# Patient Record
Sex: FEMALE | Race: WHITE | NOT HISPANIC OR LATINO | Employment: OTHER | URBAN - METROPOLITAN AREA
[De-identification: names, ages, dates, MRNs, and addresses within clinical notes are randomized per-mention and may not be internally consistent; named-entity substitution may affect disease eponyms.]

---

## 2019-12-21 ENCOUNTER — HOSPITAL ENCOUNTER (EMERGENCY)
Facility: HOSPITAL | Age: 84
Discharge: HOME | End: 2019-12-21
Attending: EMERGENCY MEDICINE
Payer: MEDICARE

## 2019-12-21 VITALS
TEMPERATURE: 99 F | HEART RATE: 61 BPM | HEIGHT: 62 IN | WEIGHT: 107 LBS | BODY MASS INDEX: 19.69 KG/M2 | DIASTOLIC BLOOD PRESSURE: 61 MMHG | OXYGEN SATURATION: 97 % | SYSTOLIC BLOOD PRESSURE: 130 MMHG | RESPIRATION RATE: 20 BRPM

## 2019-12-21 DIAGNOSIS — R00.0 TACHYCARDIA: ICD-10-CM

## 2019-12-21 DIAGNOSIS — E87.6 HYPOKALEMIA: Primary | ICD-10-CM

## 2019-12-21 DIAGNOSIS — R00.2 PALPITATIONS: ICD-10-CM

## 2019-12-21 LAB
ALBUMIN SERPL BCP-MCNC: 3.8 G/DL (ref 3.5–5.2)
ALP SERPL-CCNC: 131 U/L (ref 55–135)
ALT SERPL W/O P-5'-P-CCNC: 12 U/L (ref 10–44)
ANION GAP SERPL CALC-SCNC: 13 MMOL/L (ref 8–16)
AST SERPL-CCNC: 21 U/L (ref 10–40)
BACTERIA #/AREA URNS AUTO: ABNORMAL /HPF
BASOPHILS # BLD AUTO: 0.04 K/UL (ref 0–0.2)
BASOPHILS NFR BLD: 0.9 % (ref 0–1.9)
BILIRUB SERPL-MCNC: 0.4 MG/DL (ref 0.1–1)
BILIRUB UR QL STRIP: NEGATIVE
BNP SERPL-MCNC: 194 PG/ML (ref 0–99)
BUN SERPL-MCNC: 10 MG/DL (ref 10–30)
BUN SERPL-MCNC: 9 MG/DL (ref 6–30)
CALCIUM SERPL-MCNC: 9.9 MG/DL (ref 8.7–10.5)
CHLORIDE SERPL-SCNC: 100 MMOL/L (ref 95–110)
CHLORIDE SERPL-SCNC: 96 MMOL/L (ref 95–110)
CLARITY UR REFRACT.AUTO: CLEAR
CO2 SERPL-SCNC: 24 MMOL/L (ref 23–29)
COLOR UR AUTO: ABNORMAL
CREAT SERPL-MCNC: 0.5 MG/DL (ref 0.5–1.4)
CREAT SERPL-MCNC: 0.7 MG/DL (ref 0.5–1.4)
DIFFERENTIAL METHOD: ABNORMAL
EOSINOPHIL # BLD AUTO: 0.1 K/UL (ref 0–0.5)
EOSINOPHIL NFR BLD: 3.1 % (ref 0–8)
ERYTHROCYTE [DISTWIDTH] IN BLOOD BY AUTOMATED COUNT: 12.5 % (ref 11.5–14.5)
EST. GFR  (AFRICAN AMERICAN): >60 ML/MIN/1.73 M^2
EST. GFR  (NON AFRICAN AMERICAN): >60 ML/MIN/1.73 M^2
GLUCOSE SERPL-MCNC: 110 MG/DL (ref 70–110)
GLUCOSE SERPL-MCNC: 141 MG/DL (ref 70–110)
GLUCOSE UR QL STRIP: NEGATIVE
HCT VFR BLD AUTO: 39.8 % (ref 37–48.5)
HCT VFR BLD CALC: 35 %PCV (ref 36–54)
HGB BLD-MCNC: 13.3 G/DL (ref 12–16)
HGB UR QL STRIP: NEGATIVE
IMM GRANULOCYTES # BLD AUTO: 0 K/UL (ref 0–0.04)
IMM GRANULOCYTES NFR BLD AUTO: 0 % (ref 0–0.5)
KETONES UR QL STRIP: NEGATIVE
LEUKOCYTE ESTERASE UR QL STRIP: ABNORMAL
LYMPHOCYTES # BLD AUTO: 1.7 K/UL (ref 1–4.8)
LYMPHOCYTES NFR BLD: 36.9 % (ref 18–48)
MAGNESIUM SERPL-MCNC: 1.9 MG/DL (ref 1.6–2.6)
MCH RBC QN AUTO: 32.4 PG (ref 27–31)
MCHC RBC AUTO-ENTMCNC: 33.4 G/DL (ref 32–36)
MCV RBC AUTO: 97 FL (ref 82–98)
MICROSCOPIC COMMENT: ABNORMAL
MONOCYTES # BLD AUTO: 0.5 K/UL (ref 0.3–1)
MONOCYTES NFR BLD: 10.9 % (ref 4–15)
NEUTROPHILS # BLD AUTO: 2.2 K/UL (ref 1.8–7.7)
NEUTROPHILS NFR BLD: 48.2 % (ref 38–73)
NITRITE UR QL STRIP: NEGATIVE
NON-SQ EPI CELLS #/AREA URNS AUTO: 1 /HPF
NRBC BLD-RTO: 0 /100 WBC
PH UR STRIP: 7 [PH] (ref 5–8)
PHOSPHATE SERPL-MCNC: 2.9 MG/DL (ref 2.7–4.5)
PLATELET # BLD AUTO: 244 K/UL (ref 150–350)
PMV BLD AUTO: 9.8 FL (ref 9.2–12.9)
POC IONIZED CALCIUM: 1.07 MMOL/L (ref 1.06–1.42)
POC TCO2 (MEASURED): 24 MMOL/L (ref 23–29)
POTASSIUM BLD-SCNC: 3.7 MMOL/L (ref 3.5–5.1)
POTASSIUM SERPL-SCNC: 2.7 MMOL/L (ref 3.5–5.1)
PROT SERPL-MCNC: 7.6 G/DL (ref 6–8.4)
PROT UR QL STRIP: NEGATIVE
RBC # BLD AUTO: 4.11 M/UL (ref 4–5.4)
RBC #/AREA URNS AUTO: 1 /HPF (ref 0–4)
SAMPLE: ABNORMAL
SODIUM BLD-SCNC: 133 MMOL/L (ref 136–145)
SODIUM SERPL-SCNC: 133 MMOL/L (ref 136–145)
SP GR UR STRIP: 1 (ref 1–1.03)
SQUAMOUS #/AREA URNS AUTO: 1 /HPF
TROPONIN I SERPL DL<=0.01 NG/ML-MCNC: 0.02 NG/ML (ref 0–0.03)
TSH SERPL DL<=0.005 MIU/L-ACNC: 0.63 UIU/ML (ref 0.4–4)
URN SPEC COLLECT METH UR: ABNORMAL
WBC # BLD AUTO: 4.5 K/UL (ref 3.9–12.7)
WBC #/AREA URNS AUTO: 27 /HPF (ref 0–5)

## 2019-12-21 PROCEDURE — 83735 ASSAY OF MAGNESIUM: CPT

## 2019-12-21 PROCEDURE — 84484 ASSAY OF TROPONIN QUANT: CPT

## 2019-12-21 PROCEDURE — 81001 URINALYSIS AUTO W/SCOPE: CPT

## 2019-12-21 PROCEDURE — 25000003 PHARM REV CODE 250: Performed by: STUDENT IN AN ORGANIZED HEALTH CARE EDUCATION/TRAINING PROGRAM

## 2019-12-21 PROCEDURE — 83880 ASSAY OF NATRIURETIC PEPTIDE: CPT

## 2019-12-21 PROCEDURE — 99285 PR EMERGENCY DEPT VISIT,LEVEL V: ICD-10-PCS | Mod: GC,,, | Performed by: EMERGENCY MEDICINE

## 2019-12-21 PROCEDURE — 93005 ELECTROCARDIOGRAM TRACING: CPT

## 2019-12-21 PROCEDURE — 99285 EMERGENCY DEPT VISIT HI MDM: CPT | Mod: GC,,, | Performed by: EMERGENCY MEDICINE

## 2019-12-21 PROCEDURE — 87086 URINE CULTURE/COLONY COUNT: CPT

## 2019-12-21 PROCEDURE — 84100 ASSAY OF PHOSPHORUS: CPT

## 2019-12-21 PROCEDURE — 85025 COMPLETE CBC W/AUTO DIFF WBC: CPT

## 2019-12-21 PROCEDURE — 84443 ASSAY THYROID STIM HORMONE: CPT

## 2019-12-21 PROCEDURE — 63600175 PHARM REV CODE 636 W HCPCS: Performed by: EMERGENCY MEDICINE

## 2019-12-21 PROCEDURE — 96366 THER/PROPH/DIAG IV INF ADDON: CPT

## 2019-12-21 PROCEDURE — 63600175 PHARM REV CODE 636 W HCPCS: Performed by: STUDENT IN AN ORGANIZED HEALTH CARE EDUCATION/TRAINING PROGRAM

## 2019-12-21 PROCEDURE — 96365 THER/PROPH/DIAG IV INF INIT: CPT

## 2019-12-21 PROCEDURE — 99284 EMERGENCY DEPT VISIT MOD MDM: CPT | Mod: 25

## 2019-12-21 PROCEDURE — 96361 HYDRATE IV INFUSION ADD-ON: CPT

## 2019-12-21 PROCEDURE — 93010 ELECTROCARDIOGRAM REPORT: CPT | Mod: 76,,, | Performed by: INTERNAL MEDICINE

## 2019-12-21 PROCEDURE — 96368 THER/DIAG CONCURRENT INF: CPT

## 2019-12-21 PROCEDURE — 93010 ELECTROCARDIOGRAM REPORT: CPT | Mod: ,,, | Performed by: INTERNAL MEDICINE

## 2019-12-21 PROCEDURE — 80053 COMPREHEN METABOLIC PANEL: CPT

## 2019-12-21 PROCEDURE — 80047 BASIC METABLC PNL IONIZED CA: CPT | Mod: 59

## 2019-12-21 PROCEDURE — 25000003 PHARM REV CODE 250: Performed by: EMERGENCY MEDICINE

## 2019-12-21 PROCEDURE — 93010 EKG 12-LEAD: ICD-10-PCS | Mod: 76,,, | Performed by: INTERNAL MEDICINE

## 2019-12-21 RX ORDER — POTASSIUM CHLORIDE 7.45 MG/ML
10 INJECTION INTRAVENOUS
Status: COMPLETED | OUTPATIENT
Start: 2019-12-21 | End: 2019-12-21

## 2019-12-21 RX ORDER — METOPROLOL TARTRATE 25 MG/1
25 TABLET, FILM COATED ORAL
Status: COMPLETED | OUTPATIENT
Start: 2019-12-21 | End: 2019-12-21

## 2019-12-21 RX ORDER — MAGNESIUM SULFATE HEPTAHYDRATE 40 MG/ML
2 INJECTION, SOLUTION INTRAVENOUS
Status: COMPLETED | OUTPATIENT
Start: 2019-12-21 | End: 2019-12-21

## 2019-12-21 RX ORDER — METOPROLOL SUCCINATE 50 MG/1
50 TABLET, EXTENDED RELEASE ORAL
Status: COMPLETED | OUTPATIENT
Start: 2019-12-21 | End: 2019-12-21

## 2019-12-21 RX ORDER — SODIUM CHLORIDE 9 MG/ML
500 INJECTION, SOLUTION INTRAVENOUS
Status: COMPLETED | OUTPATIENT
Start: 2019-12-21 | End: 2019-12-21

## 2019-12-21 RX ORDER — POTASSIUM CHLORIDE 20 MEQ/1
40 TABLET, EXTENDED RELEASE ORAL
Status: COMPLETED | OUTPATIENT
Start: 2019-12-21 | End: 2019-12-21

## 2019-12-21 RX ADMIN — METOPROLOL SUCCINATE 50 MG: 50 TABLET, EXTENDED RELEASE ORAL at 07:12

## 2019-12-21 RX ADMIN — POTASSIUM CHLORIDE 40 MEQ: 1500 TABLET, EXTENDED RELEASE ORAL at 08:12

## 2019-12-21 RX ADMIN — SODIUM CHLORIDE 500 ML: 0.9 INJECTION, SOLUTION INTRAVENOUS at 07:12

## 2019-12-21 RX ADMIN — MAGNESIUM SULFATE IN WATER 2 G: 40 INJECTION, SOLUTION INTRAVENOUS at 08:12

## 2019-12-21 RX ADMIN — POTASSIUM CHLORIDE 10 MEQ: 7.46 INJECTION, SOLUTION INTRAVENOUS at 08:12

## 2019-12-21 RX ADMIN — METOPROLOL TARTRATE 25 MG: 25 TABLET, FILM COATED ORAL at 07:12

## 2019-12-22 LAB
BACTERIA UR CULT: NORMAL
BACTERIA UR CULT: NORMAL

## 2019-12-22 NOTE — ED NOTES
Patient denies cp/palpitations at this time. No complaints or needs at this time. NAD noted. Family at bedside.

## 2019-12-22 NOTE — DISCHARGE INSTRUCTIONS
You were recently seen in the ED for palpitations. We obtained labs and noticed your potassium level was low. We also noticed some white blood cells in your urine which can be indicative of a urinary tract infection. Since you are not experiencing any urinary tract symptoms, we will not start antibiotics. We will follow up with the urine culture and notify you if it is positive for bacteria.   We discussed that we noticed an abnormal rhythm called atrial fibrillation. We discussed our preference to admit you into the hospital but also very well respect your hesitation.     We encourage that you eat more bananas to increase your potassium levels.     We also strongly advise that you follow up with your primary care provider within a week.     Return to the ED if you experience increasing palpitations, chest pain, shortness of breath, lightheadedness.

## 2019-12-22 NOTE — ED PROVIDER NOTES
Encounter Date: 12/21/2019       History     Chief Complaint   Patient presents with    Palpitations     states she keeps having flashes     Elisha Young is a 89 year old F with HTN, HLD, Diastolic dysfunction presenting to the ED for palpitations. Patient states earlier today while resting, she noted palpitations which occurred for a few minutes associated with not 'feeling herself', but denies lightheadedness, chest pain or SOB. Shortly after the episode resolved, she took a walk next door but didn't experience any further symptoms. She denies prior similar episodes. She didn't take her metoprolol dose this AM because her BP machine was broke and she was unable to check her pulse prior to taking it. She denies chest pain, SOB, nausea, vomiting, fever, chills, dysuria, LE edema, urinary changes or weakness. She states she has been eating well and denies loss of appetite. She denies any further episodes of palpitations, however she endorses not feeling quite like herself since the first episode. Patient's son who was at bedside stated she has been feeling anxious for a while now and constantly worries about her family.    The history is provided by the patient and a relative.     Review of patient's allergies indicates:  No Known Allergies  Past Medical History:   Diagnosis Date    CHF (congestive heart failure)     Dyslipidemia     Hyperlipidemia     Hypertension      Past Surgical History:   Procedure Laterality Date    HYSTERECTOMY      tonsils       Family History   Problem Relation Age of Onset    Heart disease Father      Social History     Tobacco Use    Smoking status: Never Smoker   Substance Use Topics    Alcohol use: No    Drug use: Not on file     Review of Systems   Constitutional: Positive for activity change. Negative for chills, fatigue and fever.   Respiratory: Negative for cough, chest tightness, shortness of breath and wheezing.    Cardiovascular: Positive for palpitations. Negative  for chest pain and leg swelling.   Gastrointestinal: Negative for abdominal distention, abdominal pain, constipation, diarrhea, nausea and vomiting.   Endocrine: Negative for polyuria.   Genitourinary: Negative for difficulty urinating, dysuria and frequency.   Musculoskeletal: Negative for gait problem and joint swelling.   Skin: Negative for wound.   Neurological: Negative for dizziness, weakness, light-headedness, numbness and headaches.   Psychiatric/Behavioral: Negative for behavioral problems, confusion and decreased concentration.   All other systems reviewed and are negative.      Physical Exam     Initial Vitals [12/21/19 1758]   BP Pulse Resp Temp SpO2   119/60 110 20 98.1 °F (36.7 °C) 97 %      MAP       --         Physical Exam    Constitutional: She appears well-developed and well-nourished. She is not diaphoretic. No distress.   HENT:   Head: Normocephalic and atraumatic.   Eyes: Conjunctivae and EOM are normal. No scleral icterus.   Neck: Normal range of motion. Neck supple. No thyromegaly present. No JVD present.   Cardiovascular: Normal heart sounds and intact distal pulses.   No murmur heard.  Tachycardic. Irregularly irregular rhythm   Pulmonary/Chest: Breath sounds normal. No respiratory distress. She has no wheezes. She has no rhonchi. She has no rales.   Abdominal: Soft. Bowel sounds are normal. She exhibits no distension. There is no tenderness.   Musculoskeletal: Normal range of motion. She exhibits no edema or tenderness.   Neurological: She is alert and oriented to person, place, and time.   Skin: Skin is warm. Capillary refill takes less than 2 seconds.   Psychiatric: She has a normal mood and affect. Her behavior is normal. Thought content normal.         ED Course   Procedures  Labs Reviewed   CBC W/ AUTO DIFFERENTIAL - Abnormal; Notable for the following components:       Result Value    Mean Corpuscular Hemoglobin 32.4 (*)     All other components within normal limits    Narrative:      SHARED LAV   COMPREHENSIVE METABOLIC PANEL - Abnormal; Notable for the following components:    Sodium 133 (*)     Potassium 2.7 (*)     Glucose 141 (*)     All other components within normal limits    Narrative:     SHARED LAV   B-TYPE NATRIURETIC PEPTIDE - Abnormal; Notable for the following components:     (*)     All other components within normal limits    Narrative:     SHARED LAV   URINALYSIS, REFLEX TO URINE CULTURE - Abnormal; Notable for the following components:    Leukocytes, UA 3+ (*)     All other components within normal limits    Narrative:     Preferred Collection Type->Urine, Clean Catch   URINALYSIS MICROSCOPIC - Abnormal; Notable for the following components:    WBC, UA 27 (*)     Non-Squam Epith 1 (*)     All other components within normal limits    Narrative:     Preferred Collection Type->Urine, Clean Catch   ISTAT PROCEDURE - Abnormal; Notable for the following components:    POC Sodium 133 (*)     POC Hematocrit 35 (*)     All other components within normal limits   CULTURE, URINE   TROPONIN I    Narrative:     SHARED LAV   MAGNESIUM    Narrative:     SHARED LAV   TSH    Narrative:     SHARED LAV   PHOSPHORUS    Narrative:     SHARED LAV   ISTAT CHEM8     EKG Readings: (Independently Interpreted)   Initial Reading: No STEMI. Rhythm: Sinus Tachycardia. Heart Rate: 132. Ectopy: PVCs. ST Segments: Non-Specific ST Segment Depression. Axis: Left Axis Deviation.       Imaging Results    None          Medical Decision Making:   History:   I obtained history from: someone other than patient.  Old Medical Records: I decided to obtain old medical records.  Old Records Summarized: records from clinic visits.       <> Summary of Records: Patient was last seen by cardiology in 2015 for a follow up.   Initial Assessment:   Elisha Young is a 89 year old F with diastolic dysfunction, HTN, dyslipidemia presenting for an episode of palpitations without associated symptoms. Noted to have new onset  A-fib. Tachycardia improved with fluid and metoprolol administration.   Differential Diagnosis:   Includes but not limited to:     Cardiac Arrhythmia  Hyperthyroidism  Electrolyte abnormalities  Sepsis  Anxiety  Independently Interpreted Test(s):   I have ordered and independently interpreted X-rays - see prior notes.  I have ordered and independently interpreted EKG Reading(s) - see prior notes  Clinical Tests:   Lab Tests: Ordered and Reviewed  Radiological Study: Ordered and Reviewed  Medical Tests: Ordered and Reviewed  ED Management:  On arrival noted to be in 100s. Patient denies feeling palpitations. Labs have been ordered. Administered 500cc bolus given patient does have diastolic dysfunction and there is no recent TTE to assess for cardiac function. Administered patient's missed dose of metoprolol 25mg.   Noted to be in and out of atrial fibrillation. Although unsure of onset, as patient doesn't feel palpitations.   1845: received 500cc of IV NS and metoprolol 50mg. Tachycardia appeared to improve. HR ~90s. CMP shows hypokalemia. replacing with 10mEq IV potassium and 40mEq of oral potassium.     2130: Informed the patient of our intention to admit for further monitoring given she is in paroxysmal A-fib. Patient stated she has been told of an 'irregular heartbeat' but unaware of a prior diagnosis of A-fib. Patient declined admission and stated she would rather go home. Strongly encouraged that the patient stay but she continued to hesitate. Explained the possible risks and patient expressed understanding.   UA shows pyuria without bacteria. Since patient is asymptomatic, we will not treat at this time. If urine culture returns positive, then we will contact patient for treatment. Advised patient to follow up with PCP within a week.     2253: Repeat K 3.7. Advised patient to eat more bananas. Patient is stable and is being discharged home.               Attending Attestation:   Physician Attestation Statement  for Resident:  As the supervising MD   Physician Attestation Statement: I have personally seen and examined this patient.   I agree with the above history. -:   As the supervising MD I agree with the above PE.    As the supervising MD I agree with the above treatment, course, plan, and disposition.            Attending ED Notes:   Emergent evaluation of palpitations.  Patient presents with tachycardia.  Initial concerns for medication noncompliance, dehydration, electrolyte abnormality.  She is on metoprolol at home, but has not taken her dose today.  Hemodynamically stable. Lab work obtained.  Hypokalemia noted.  Began oral and IV replacement.  IV fluids given.  Normal renal function. Given her tachy dysrhythmia and electrolyte abnormality, it seemed that the patient should be admitted for cardiac monitoring.  However after discussion with her and her son, she would like to go home.  Potassium repleted in the emergency department and repeat testing was within normal limits. Recommended that she follow up this week with her primary care provider for re-evaluation of her electrolytes.  Return precautions advised.                        Clinical Impression:       ICD-10-CM ICD-9-CM   1. Hypokalemia E87.6 276.8   2. Palpitations R00.2 785.1   3. Tachycardia R00.0 785.0         Disposition:   Disposition: Discharged  Condition: Stable                     Rudi Carr MD  Resident  12/21/19 [id number]       Oscar Munoz MD  12/22/19 0006

## 2019-12-26 ENCOUNTER — HOSPITAL ENCOUNTER (INPATIENT)
Facility: HOSPITAL | Age: 84
LOS: 7 days | Discharge: NURSING, REHAB OR LONG-TERM CARE | DRG: 536 | End: 2020-01-02
Attending: EMERGENCY MEDICINE | Admitting: HOSPITALIST
Payer: MEDICARE

## 2019-12-26 DIAGNOSIS — S32.10XA CLOSED FRACTURE OF SACRUM, UNSPECIFIED FRACTURE MORPHOLOGY, INITIAL ENCOUNTER: ICD-10-CM

## 2019-12-26 DIAGNOSIS — S41.111A SKIN TEAR OF RIGHT UPPER ARM WITHOUT COMPLICATION, INITIAL ENCOUNTER: ICD-10-CM

## 2019-12-26 DIAGNOSIS — S32.591A CLOSED FRACTURE OF RAMUS OF RIGHT PUBIS, INITIAL ENCOUNTER: ICD-10-CM
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
## 2019-12-26 DIAGNOSIS — E87.6 HYPOKALEMIA: ICD-10-CM

## 2019-12-26 DIAGNOSIS — D64.9 ANEMIA, UNSPECIFIED TYPE: ICD-10-CM

## 2019-12-26 DIAGNOSIS — R79.89 ELEVATED TROPONIN: ICD-10-CM

## 2019-12-26 DIAGNOSIS — R00.0 TACHYCARDIA: ICD-10-CM

## 2019-12-26 DIAGNOSIS — I49.9 CARDIAC RHYTHM DISORDER OR DISTURBANCE OR CHANGE: ICD-10-CM

## 2019-12-26 DIAGNOSIS — S32.591A INFERIOR PUBIC RAMUS FRACTURE, RIGHT, CLOSED, INITIAL ENCOUNTER: ICD-10-CM

## 2019-12-26 DIAGNOSIS — I10 ESSENTIAL HYPERTENSION: ICD-10-CM

## 2019-12-26 DIAGNOSIS — I48.91 NEW ONSET ATRIAL FIBRILLATION: Primary | ICD-10-CM

## 2019-12-26 DIAGNOSIS — S32.591A: ICD-10-CM

## 2019-12-26 DIAGNOSIS — S32.511A CLOSED FRACTURE OF SUPERIOR RAMUS OF RIGHT PUBIS, INITIAL ENCOUNTER: ICD-10-CM

## 2019-12-26 PROBLEM — E87.1 HYPONATREMIA: Status: ACTIVE | Noted: 2019-12-26

## 2019-12-26 LAB
ABO + RH BLD: NORMAL
ALBUMIN SERPL BCP-MCNC: 3.7 G/DL (ref 3.5–5.2)
ALP SERPL-CCNC: 109 U/L (ref 55–135)
ALT SERPL W/O P-5'-P-CCNC: 12 U/L (ref 10–44)
ANION GAP SERPL CALC-SCNC: 13 MMOL/L (ref 8–16)
AST SERPL-CCNC: 24 U/L (ref 10–40)
BASOPHILS # BLD AUTO: 0.03 K/UL (ref 0–0.2)
BASOPHILS NFR BLD: 0.3 % (ref 0–1.9)
BILIRUB SERPL-MCNC: 0.7 MG/DL (ref 0.1–1)
BLD GP AB SCN CELLS X3 SERPL QL: NORMAL
BUN SERPL-MCNC: 23 MG/DL (ref 10–30)
CALCIUM SERPL-MCNC: 10 MG/DL (ref 8.7–10.5)
CHLORIDE SERPL-SCNC: 97 MMOL/L (ref 95–110)
CO2 SERPL-SCNC: 23 MMOL/L (ref 23–29)
CREAT SERPL-MCNC: 0.8 MG/DL (ref 0.5–1.4)
DIFFERENTIAL METHOD: ABNORMAL
EOSINOPHIL # BLD AUTO: 0 K/UL (ref 0–0.5)
EOSINOPHIL NFR BLD: 0.3 % (ref 0–8)
ERYTHROCYTE [DISTWIDTH] IN BLOOD BY AUTOMATED COUNT: 12.9 % (ref 11.5–14.5)
EST. GFR  (AFRICAN AMERICAN): >60 ML/MIN/1.73 M^2
EST. GFR  (NON AFRICAN AMERICAN): >60 ML/MIN/1.73 M^2
GLUCOSE SERPL-MCNC: 134 MG/DL (ref 70–110)
HCT VFR BLD AUTO: 31 % (ref 37–48.5)
HGB BLD-MCNC: 10.4 G/DL (ref 12–16)
IMM GRANULOCYTES # BLD AUTO: 0.03 K/UL (ref 0–0.04)
IMM GRANULOCYTES NFR BLD AUTO: 0.3 % (ref 0–0.5)
INR PPP: 1 (ref 0.8–1.2)
LYMPHOCYTES # BLD AUTO: 1.3 K/UL (ref 1–4.8)
LYMPHOCYTES NFR BLD: 14.6 % (ref 18–48)
MAGNESIUM SERPL-MCNC: 2.2 MG/DL (ref 1.6–2.6)
MCH RBC QN AUTO: 33.2 PG (ref 27–31)
MCHC RBC AUTO-ENTMCNC: 33.5 G/DL (ref 32–36)
MCV RBC AUTO: 99 FL (ref 82–98)
MONOCYTES # BLD AUTO: 0.9 K/UL (ref 0.3–1)
MONOCYTES NFR BLD: 9.8 % (ref 4–15)
NEUTROPHILS # BLD AUTO: 6.7 K/UL (ref 1.8–7.7)
NEUTROPHILS NFR BLD: 74.7 % (ref 38–73)
NRBC BLD-RTO: 0 /100 WBC
PLATELET # BLD AUTO: 191 K/UL (ref 150–350)
PMV BLD AUTO: 10.4 FL (ref 9.2–12.9)
POTASSIUM SERPL-SCNC: 3.1 MMOL/L (ref 3.5–5.1)
PROT SERPL-MCNC: 7 G/DL (ref 6–8.4)
PROTHROMBIN TIME: 10.7 SEC (ref 9–12.5)
RBC # BLD AUTO: 3.13 M/UL (ref 4–5.4)
SODIUM SERPL-SCNC: 133 MMOL/L (ref 136–145)
TROPONIN I SERPL DL<=0.01 NG/ML-MCNC: 0.07 NG/ML (ref 0–0.03)
TROPONIN I SERPL DL<=0.01 NG/ML-MCNC: 0.12 NG/ML (ref 0–0.03)
TROPONIN I SERPL DL<=0.01 NG/ML-MCNC: 0.13 NG/ML (ref 0–0.03)
TSH SERPL DL<=0.005 MIU/L-ACNC: 0.93 UIU/ML (ref 0.4–4)
WBC # BLD AUTO: 8.92 K/UL (ref 3.9–12.7)

## 2019-12-26 PROCEDURE — 84443 ASSAY THYROID STIM HORMONE: CPT

## 2019-12-26 PROCEDURE — 93005 ELECTROCARDIOGRAM TRACING: CPT

## 2019-12-26 PROCEDURE — 85025 COMPLETE CBC W/AUTO DIFF WBC: CPT

## 2019-12-26 PROCEDURE — 99285 EMERGENCY DEPT VISIT HI MDM: CPT | Mod: 25

## 2019-12-26 PROCEDURE — 85610 PROTHROMBIN TIME: CPT

## 2019-12-26 PROCEDURE — 96361 HYDRATE IV INFUSION ADD-ON: CPT

## 2019-12-26 PROCEDURE — 99285 PR EMERGENCY DEPT VISIT,LEVEL V: ICD-10-PCS | Mod: ,,, | Performed by: EMERGENCY MEDICINE

## 2019-12-26 PROCEDURE — 86850 RBC ANTIBODY SCREEN: CPT

## 2019-12-26 PROCEDURE — 90471 IMMUNIZATION ADMIN: CPT | Performed by: EMERGENCY MEDICINE

## 2019-12-26 PROCEDURE — 99285 EMERGENCY DEPT VISIT HI MDM: CPT | Mod: ,,, | Performed by: EMERGENCY MEDICINE

## 2019-12-26 PROCEDURE — 84484 ASSAY OF TROPONIN QUANT: CPT | Mod: 91

## 2019-12-26 PROCEDURE — 20600001 HC STEP DOWN PRIVATE ROOM

## 2019-12-26 PROCEDURE — 36415 COLL VENOUS BLD VENIPUNCTURE: CPT

## 2019-12-26 PROCEDURE — 99223 1ST HOSP IP/OBS HIGH 75: CPT | Mod: AI,,, | Performed by: HOSPITALIST

## 2019-12-26 PROCEDURE — 80053 COMPREHEN METABOLIC PANEL: CPT

## 2019-12-26 PROCEDURE — 99223 PR INITIAL HOSPITAL CARE,LEVL III: ICD-10-PCS | Mod: AI,,, | Performed by: HOSPITALIST

## 2019-12-26 PROCEDURE — 90715 TDAP VACCINE 7 YRS/> IM: CPT | Performed by: EMERGENCY MEDICINE

## 2019-12-26 PROCEDURE — 25000003 PHARM REV CODE 250: Performed by: EMERGENCY MEDICINE

## 2019-12-26 PROCEDURE — 93010 EKG 12-LEAD: ICD-10-PCS | Mod: ,,, | Performed by: INTERNAL MEDICINE

## 2019-12-26 PROCEDURE — 96374 THER/PROPH/DIAG INJ IV PUSH: CPT

## 2019-12-26 PROCEDURE — 93010 ELECTROCARDIOGRAM REPORT: CPT | Mod: 76,,, | Performed by: INTERNAL MEDICINE

## 2019-12-26 PROCEDURE — 83735 ASSAY OF MAGNESIUM: CPT

## 2019-12-26 PROCEDURE — 63600175 PHARM REV CODE 636 W HCPCS: Performed by: EMERGENCY MEDICINE

## 2019-12-26 RX ORDER — ACETAMINOPHEN 325 MG/1
650 TABLET ORAL EVERY 6 HOURS PRN
Status: DISCONTINUED | OUTPATIENT
Start: 2019-12-26 | End: 2019-12-26

## 2019-12-26 RX ORDER — ONDANSETRON 2 MG/ML
4 INJECTION INTRAMUSCULAR; INTRAVENOUS EVERY 8 HOURS PRN
Status: DISCONTINUED | OUTPATIENT
Start: 2019-12-26 | End: 2020-01-02 | Stop reason: HOSPADM

## 2019-12-26 RX ORDER — METOPROLOL TARTRATE 1 MG/ML
5 INJECTION, SOLUTION INTRAVENOUS
Status: COMPLETED | OUTPATIENT
Start: 2019-12-26 | End: 2019-12-26

## 2019-12-26 RX ORDER — POTASSIUM CHLORIDE 1500 MG/1
TABLET, EXTENDED RELEASE ORAL ONCE
Status: ON HOLD | COMMUNITY
End: 2019-12-31 | Stop reason: HOSPADM

## 2019-12-26 RX ORDER — ACETAMINOPHEN 325 MG/1
650 TABLET ORAL EVERY 6 HOURS PRN
Status: DISCONTINUED | OUTPATIENT
Start: 2019-12-26 | End: 2020-01-02 | Stop reason: HOSPADM

## 2019-12-26 RX ORDER — GLUCAGON 1 MG
1 KIT INJECTION
Status: DISCONTINUED | OUTPATIENT
Start: 2019-12-26 | End: 2020-01-02 | Stop reason: HOSPADM

## 2019-12-26 RX ORDER — BACITRACIN ZINC 500 UNIT/G
1 OINTMENT (GRAM) TOPICAL
Status: DISPENSED | OUTPATIENT
Start: 2019-12-26 | End: 2019-12-26

## 2019-12-26 RX ORDER — SODIUM CHLORIDE 0.9 % (FLUSH) 0.9 %
10 SYRINGE (ML) INJECTION
Status: DISCONTINUED | OUTPATIENT
Start: 2019-12-26 | End: 2020-01-02 | Stop reason: HOSPADM

## 2019-12-26 RX ORDER — POTASSIUM CHLORIDE 20 MEQ/1
40 TABLET, EXTENDED RELEASE ORAL
Status: COMPLETED | OUTPATIENT
Start: 2019-12-26 | End: 2019-12-26

## 2019-12-26 RX ORDER — IBUPROFEN 200 MG
24 TABLET ORAL
Status: DISCONTINUED | OUTPATIENT
Start: 2019-12-26 | End: 2020-01-02 | Stop reason: HOSPADM

## 2019-12-26 RX ORDER — ASPIRIN 325 MG
325 TABLET ORAL
Status: ACTIVE | OUTPATIENT
Start: 2019-12-26 | End: 2019-12-26

## 2019-12-26 RX ORDER — IBUPROFEN 200 MG
16 TABLET ORAL
Status: DISCONTINUED | OUTPATIENT
Start: 2019-12-26 | End: 2020-01-02 | Stop reason: HOSPADM

## 2019-12-26 RX ADMIN — POTASSIUM CHLORIDE 40 MEQ: 1500 TABLET, EXTENDED RELEASE ORAL at 02:12

## 2019-12-26 RX ADMIN — METOPROLOL TARTRATE 5 MG: 5 INJECTION INTRAVENOUS at 11:12

## 2019-12-26 RX ADMIN — CLOSTRIDIUM TETANI TOXOID ANTIGEN (FORMALDEHYDE INACTIVATED), CORYNEBACTERIUM DIPHTHERIAE TOXOID ANTIGEN (FORMALDEHYDE INACTIVATED), BORDETELLA PERTUSSIS TOXOID ANTIGEN (GLUTARALDEHYDE INACTIVATED), BORDETELLA PERTUSSIS FILAMENTOUS HEMAGGLUTININ ANTIGEN (FORMALDEHYDE INACTIVATED), BORDETELLA PERTUSSIS PERTACTIN ANTIGEN, AND BORDETELLA PERTUSSIS FIMBRIAE 2/3 ANTIGEN 0.5 ML: 5; 2; 2.5; 5; 3; 5 INJECTION, SUSPENSION INTRAMUSCULAR at 12:12

## 2019-12-26 RX ADMIN — SODIUM CHLORIDE, POTASSIUM CHLORIDE, SODIUM LACTATE AND CALCIUM CHLORIDE 250 ML: 600; 310; 30; 20 INJECTION, SOLUTION INTRAVENOUS at 12:12

## 2019-12-26 NOTE — ED NOTES
Skin tear rt upper arm cleansed w/ sterile NS and mepalex dressing applied waiting for bacitracin to arrive from  in pt pharm

## 2019-12-26 NOTE — ED PROVIDER NOTES
Encounter Date: 12/26/2019       History     Chief Complaint   Patient presents with    Hip Pain     Right hip pain s/p falll on Tuesday night. Pt having difficulty with ambulation. Abrasion noted to right upper arm.      The patient is a 89-year-old female who presents to the emergency department after sustaining a mechanical fall 2 days ago.  She states that she tripped and fell, striking her right forearm on a door and her right hip on a wood floor.  She denies head trauma or loss of consciousness.  Since then, she has been able to ambulate with discomfort.  She reports an associated bruise to the right hip area.  Upon striking the door with her right arm, she sustained a skin tear which has been actively bleeding since the fall.  Her family has been bandaging this with some relief of her bleeding.  Incidentally, upon placing the patient on a cardiac monitor, it is noted that she is in atrial fibrillation with rapid ventricular response.  When questioned about this, she states that she has been told in the past that she has some irregular heartbeat.  However, she is not currently being treated for it.  She denies any numbness, weakness, dizziness, chest pain, shortness of breath, or palpitations.  She denies any other complaints.        Review of patient's allergies indicates:  No Known Allergies  Past Medical History:   Diagnosis Date    CHF (congestive heart failure)     Dyslipidemia     Hyperlipidemia     Hypertension      Past Surgical History:   Procedure Laterality Date    HYSTERECTOMY      tonsils       Family History   Problem Relation Age of Onset    Heart disease Father      Social History     Tobacco Use    Smoking status: Never Smoker    Smokeless tobacco: Never Used   Substance Use Topics    Alcohol use: No    Drug use: Never     Review of Systems  General: Denies fever.  Denies chills.  Denies generalized weakness.  HENT: Denies sore throat.    Eyes: Denies visual changes.     Cardiovascular: Denies chest pain.  Denies shortness of breath.  Denies orthopnea.  Denies dyspnea on exertion.  Denies palpitations.  Respiratory: Denies shortness of breath.  Denies wheezing.  Denies coughing.  GI: Denies abdominal pain.  Denies nausea.  Denies vomiting.  Denies melena.  Denies hematochezia.  : Denies dysuria.  Denies hematuria.  Denies pelvic pain.    Skin:  Reports bruising to the right hip and a skin tear to the right upper arm  Neuro: Denies headache.  Denies head trauma.  Denies numbness.  Denies focal weakness.  Musculoskeletal: Denies neck pain.  Denies back pain.  Reports right hip pain. Denies arm pain.      Physical Exam     Initial Vitals [12/26/19 1058]   BP Pulse Resp Temp SpO2   127/84 (!) 120 18 98.4 °F (36.9 °C) 100 %      MAP       --         Physical Exam  General: No apparent distress.  Well-nourished.  Well-developed.  Alert and oriented x3.  Talkative.  HENT: Moist mucous membranes.  Normocephalic atraumatic.    Eyes: Pupils equally round and reactive to light.  Extraocular movements intact.  No scleral icterus.  No conjunctival pallor.  Cardiovascular:  Tachycardia noted.  Irregularly irregular.  Respiratory: Clear to auscultation bilaterally.  No wheezes, rales, or rhonchi.  No respiratory distress.  Abdomen: Soft.  Nontender.  Nondistended.  No guarding.  No rebound.  No masses.  No abdominal bruit auscultated.  Skin:  5 x 5 cm skin tear is noted to the right lateral arm with minimal active bleeding.  No surrounding erythema or purulent drainage  Neuro: Cranial nerves II through XII grossly intact.  Moving all extremities equally.  No sensory deficits.   Musculoskeletal:  No midline C-spine, T-spine, or L-spine tenderness to palpation, crepitus, or step-offs.  No bony tenderness to the chest wall, shoulders, or arms.  No bony tenderness is noted to the lower extremities except for the right lateral hip.  Full range of motion of the hip with some pain on passive range of  motion. Brisk cap refill.  2+ distal pulses.      ED Course   Procedures  Labs Reviewed   CBC W/ AUTO DIFFERENTIAL - Abnormal; Notable for the following components:       Result Value    RBC 3.13 (*)     Hemoglobin 10.4 (*)     Hematocrit 31.0 (*)     Mean Corpuscular Volume 99 (*)     Mean Corpuscular Hemoglobin 33.2 (*)     Gran% 74.7 (*)     Lymph% 14.6 (*)     All other components within normal limits   COMPREHENSIVE METABOLIC PANEL - Abnormal; Notable for the following components:    Sodium 133 (*)     Potassium 3.1 (*)     Glucose 134 (*)     All other components within normal limits   TROPONIN I - Abnormal; Notable for the following components:    Troponin I 0.068 (*)     All other components within normal limits   TROPONIN I - Abnormal; Notable for the following components:    Troponin I 0.116 (*)     All other components within normal limits   PROTIME-INR   TSH   MAGNESIUM   OCCULT BLOOD X 1, STOOL   TYPE & SCREEN     EKG Readings: (Independently Interpreted)   #1: I interpreted this EKG myself.  Motion artifact is noted. Rhythm is quite irregular.  Left axis deviation.  Inferior Q-waves.  Lateral T-wave inversions.  The rhythm appears to be a sinus tachycardia with frequent PVCs versus atrial fibrillation.  This will be repeated once metoprolol is provided.    #2:  I interpreted this EKG myself.  The rhythm is quite irregular.  Inferior Q-waves are noted. There are no obvious T-wave inversions.  Left axis deviation.     ECG Results          EKG 12-lead (Final result)  Result time 12/26/19 12:38:10    Final result by Interface, Lab In [facility] (12/26/19 12:38:10)                 Narrative:    Test Reason : R00.0,    Vent. Rate : 100 BPM     Atrial Rate : 104 BPM     P-R Int : 160 ms          QRS Dur : 078 ms      QT Int : 324 ms       P-R-T Axes : 000 -28 023 degrees     QTc Int : 418 ms      Sinus tachycardia with frequent Premature atrial complexes  Left atrial abnormality/enlargement  Minimal voltage  criteria for LVH, may be normal variant  Abnormal ECG  When compared with ECG of 26-DEC-2019 11:36,  T wave inversion no longer evident in Lateral leads  Confirmed by NATALIA BASSETT MD ([id number]) on 12/26/2019 12:38:00 PM    Referred By: GOOD   SELF           Confirmed By:NATALIA BASSETT MD                             EKG 12-lead (Final result)  Result time 12/26/19 12:30:05    Final result by Interface, Lab In [facility] (12/26/19 12:30:05)                 Narrative:    Test Reason : R00.0,    Vent. Rate : 136 BPM     Atrial Rate : 141 BPM     P-R Int : 184 ms          QRS Dur : 072 ms      QT Int : 286 ms       P-R-T Axes : 000 -30 131 degrees     QTc Int : 431 ms    Age and gender specific analysis  Sinus tachycardia with Premature supraventricular complexes with brief  supraventricular tachycardia  Left axis deviation  Left ventricular hypertrophy  ST and/or T wave abnormalities secondary to hypertrophy  Abnormal ECG  When compared with ECG of 21-DEC-2019 19:17,  Fusion complexes are now Present  Brief SVT now present  Inverted T waves have replaced nonspecific T wave abnormality in Lateral  leads  Confirmed by NATALIA BASSETT MD ([id number]) on 12/26/2019 12:29:57 PM    Referred By: GOOD   SELF           Confirmed By:NATALIA BASSETT MD                            Imaging Results           CT Pelvis Without Contrast (Final result)  Result time 12/26/19 16:53:50    Final result by Roger Pearl Jr., MD (12/26/19 16:53:50)                 Impression:      Fractures involving the right superior and inferior pubic rami and right sacrum with associated right nayeli-pelvic hematoma as detailed above.    This report was flagged in Epic as abnormal.    COMMUNICATION  This critical result was discovered/received at 1557. The critical information above was relayed directly by Kael Camp MD by telephone to MD Sergio on 12/26/2019 at 1558.    Electronically signed by resident: Kael Camp  MD  Date:    12/26/2019  Time:    15:30    Electronically signed by: Roger Paerl MD  Date:    12/26/2019  Time:    16:53             Narrative:    EXAMINATION:  CT PELVIS WITHOUT CONTRAST    CLINICAL HISTORY:  Pelvic fracture, known or suspected;    TECHNIQUE:  Low dose axial images, sagittal and coronal reformations were obtained from the iliac crests to the pubic symphysis without the administration of intravenous contrast.    COMPARISON:  Hip radiograph 12/26/2019    FINDINGS:  Visualized loops of small large bowel reveal no evidence of obstruction or inflammation.  There scattered colonic diverticula.  No pelvic ascites, intraperitoneal free air, or abdominopelvic lymphadenopathy identified.    The urinary bladder is grossly unremarkable. Operative change of hysterectomy.    Visualized abdominal aorta reveals moderate calcific atherosclerosis extending into the branch vessels.    There is a comminuted slightly displaced fracture involving the right superior pubic ramus.  There is a mildly displaced fracture involving the medial aspect of the right inferior pubic ramus (sagittal image 102 and coronal image 78.)  An additional nondisplaced fracture is noted in the right sacral ala near the sacroiliac joint (axial series 2, image 158 and coronal image 97) which extends vertically involving nearly the entire craniocaudal dimension of the right nayeli sacrum.  There is no disruption of the sacroiliac joints or pubic symphysis.  There is small volume pelvic free fluid layering along the right lateral aspect of the bladder, in the presacral soft tissues, and extending along the right iliacus muscle belly likely hematoma.  Moderate joint space narrowing of the iliofemoral joints bilaterally.  Advanced degenerative change of the distal lumbar spine.    The extra-pelvic soft tissues reveal mild subcutaneous edema, and inflammatory/contusion fat stranding overlying the right greater trochanter.                                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  No fracture dislocation bone destruction seen.      Electronically signed by: Javier Zamora MD  Date:    12/26/2019  Time:    13:18                             X-Ray Hip 2 View Right (Final result)  Result time 12/26/19 13:18:18    Final result by Javier Zamora III, MD (12/26/19 13:18:18)                 Narrative:    EXAMINATION:  XR HIP 2 VIEW RIGHT    CLINICAL HISTORY:  fall;    FINDINGS:  Two views right: There are possible fractures of the right superior and inferior pubic ramus.  There is baseline DJD.  No femur fracture seen.  CT could be helpful.  There could be an acetabular fracture as well.      Electronically signed by: Javier Zamora MD  Date:    12/26/2019  Time:    13:18                             X-Ray Chest AP Portable (Final result)  Result time 12/26/19 13:07:03    Final result by Perla Rutledge MD (12/26/19 13:07:03)                 Impression:      No source for chest pain established on this study.      Electronically signed by: Perla Rutledge MD  Date:    12/26/2019  Time:    13:07             Narrative:    EXAMINATION:  XR CHEST AP PORTABLE    CLINICAL HISTORY:  Chest Pain;    TECHNIQUE:  Single frontal view of the chest was performed.    COMPARISON:  12/16/2015.    FINDINGS:  The patient has thoracic dextrocurvature.  Allowing for this, mediastinal structures are midline.  Descending thoracic aorta is tortuous and atherosclerotic.  There is also abundant calcific atherosclerosis in the proximal right common carotid artery.    Allowing for calcified costochondral cartilage I detect no acute intrathoracic disease.  The source of the patient's chest pain is not  established on this study.    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    I detect no pneumothorax, pneumomediastinum or pneumoperitoneum.    Advanced degenerative changes are present at the shoulders in this 89-year-old woman.                              X-Rays:   Independently Interpreted Readings:   Other Readings:  Chest x-ray:  I reviewed this chest x-ray myself.  There is no evidence of infiltrate, effusion, or rib fracture.    Humerus x-ray:  I reviewed this x-ray myself.  There is no evidence of fracture or dislocation.  Shoulder DJD is noted.    Right hip x-ray:  I reviewed this x-ray myself.  There is no obvious hip fracture.  However, there appears to be possible superior and inferior pubic rami fractures.    3:59 p.m.  CT pelvis:  I discussed this study with the radiologist.  The patient has fractures of the superior pubic ramus, inferior pubic ramus, and the sacral ala on the right.    Medical Decision Making:   Initial Assessment:   This is an emergent evaluation.  The patient is noted to be in atrial fibrillation with rapid ventricular response.  She has a vague history of an irregular heartbeat.  For this, I will provide the patient with a dose of metoprolol and aspirin.  Laboratory studies and EKG have been ordered.  For the patient's mechanical fall, a right humerus and right hip x-ray have been ordered along with a chest x-ray.  Tetanus will be updated.  The skin tear will be cleaned and dressed.  I will reassess.  ED Management:  11:48 a.m.  Chart review:  In review of the patient's old record, there is no evidence of atrial fibrillation documented despite a couple of cardiology visits.    1:58 p.m.  The patient's laboratory studies and imaging studies have resulted.  She is noted to be anemic with a hemoglobin of 10.  This is a precipitous drop from a few days ago.  She is also noted to have a mild elevation in her troponin.  The etiology is unclear.  This may  be due to demand ischemia secondary to her significantly elevated heart rate.  On her repeat EKG, the patient is noted to be in atrial fibrillation which is moderately well rate controlled after a dose of metoprolol IV.  On her imaging, she is noted to have possible superior and inferior pubic rami fractures.  A pelvis CT has been ordered.  Oral potassium has also been ordered for a K of 3.1.  Because of all of these findings, I do believe the patient will require admission to hospital Medicine.  I will discuss with the hospitalist.    2:26 p.m.  I discussed this case with the hospitalist, Dr. Boateng.  He has agreed to evaluate and admit the patient.                                 Clinical Impression:     1. New onset atrial fibrillation    2. Tachycardia    3. Fall    4. Elevated troponin    5. Closed fracture of superior ramus of right pubis, initial encounter    6. Inferior pubic ramus fracture, right, closed, initial encounter    7. Skin tear of right upper arm without complication, initial encounter    8. Anemia, unspecified type    9. Hypokalemia    10. Closed fracture of sacrum, unspecified fracture morphology, initial encounter            Disposition:   Disposition: Admitted  Condition: Umm Hill MD  12/26/19 2110
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
## 2019-12-26 NOTE — ED TRIAGE NOTES
Arrives per EMS from home after fall xmas edwina - struck rt upper arm on the door and injured rt hip. Denies LOC. AAOx4, skin w/d, respirations even and unlabored.family at bedside

## 2019-12-26 NOTE — ED NOTES
Pt placed on cardiac monitor, continuous pulse ox, cycling blood pressures. Side rails up x2, call bell in reach, bed in low position with brake engaged. HR 89 - pte denies CP/SOB or dizziness. Family at beside

## 2019-12-26 NOTE — NURSING TRANSFER
Nursing Transfer Note      12/26/2019     Transfer From: ED    Transfer via stretcher    Transfer with cardiac monitoring    Transported by transport    Medicines sent: none    Chart send with patient: Yes    Notified: family at bedside    Patient reassessed at: arrival    Upon arrival to floor: cardiac monitor applied, patient oriented to room, call bell in reach and bed in lowest position

## 2019-12-26 NOTE — ED NOTES
LOC: The patient is awake and alert; oriented x 3 and speaking appropriately.  APPEARANCE: Patient resting comfortably, patient is clean and well groomed  SKIN: warm and dry, normal skin turgor & moist mucus membranes, skin intact, no breakdown noted.Skin tear rt upper arm, bruise rt hip  MUSCULOSKELETAL: Patient moving all extremities well, no obvious swelling or deformities noted, pain in rt hip  RESPIRATORY: Airway is open and patent; respirations are spontaneous, normal effort and rate  CARDIAC: Patient has a tachy rate, no peripheral edema noted, capillary refill < 3 seconds; No complaints of chest pain   ABDOMEN: Soft and non tender to palpation, no distention noted. Bowel sounds present x 4

## 2019-12-26 NOTE — H&P
[facility] Hospital Medicine  History & Physical    Patient Name: Elisha Young  MRN: [id number]  Admission Date: 12/26/2019  Attending Physician: Kenji Hill MD   Primary Care Provider: Marky García MD    [facility] Medicine Team: [facility] MED B Larry Boateng MD     Patient information was obtained from patient, past medical records and ER records.     Subjective:     Principal Problem:Closed fracture of ramus of right pubis    Chief Complaint:   Chief Complaint   Patient presents with    Hip Pain     Right hip pain s/p falll on Tuesday night. Pt having difficulty with ambulation. Abrasion noted to right upper arm.         HPI: Elisha Young is a 89 y.o. female with a PMH of HTN, HLD, Diastolic dysfunction , sinus arrhythmia  who presented to the ED on 12/26/2019 diagnosed with  Hip Pain (Right hip pain s/p falll on Tuesday night. Pt having difficulty with ambulation. Abrasion noted to right upper arm. )  AAOX 3, accompanied by son and grand daughter . mentions that fell 2 days ago on her way out of bathroom  She states that she tripped and fell, striking her right forearm on a door and her right hip on a wood floor. denies palpitations, lightheadedness prior to fall. denies head trauma or loss of consciousness.  Since then, able to get herself her up to the chair and informed her son. she has been able to ambulate with discomfort, refused to come to ER due to madelin.  She reports an associated bruise to the right hip area.  Upon striking the door with her right arm, she sustained a skin tear which has been actively bleeding since the fall.  Her family has been bandaging this with some relief of her bleeding.  while in the ER , upon placing the patient on a cardiac monitor, it is noted with tachardia -  atrial fibrillation with rapid ventricular response? at 140s improved to 100s after IV Metoprolol 5mg x 1.  she states that she has been told in the past that she has some irregular  heartbeat.  However, she is not currently being treated for it, prior cardiologist offered her nuclear stress test but patient refused it.  She denies any numbness, weakness, dizziness, chest pain, shortness of breath, or palpitations.  She denies any other complaints.    lives alone in [facility]. ADL/IADL independent at baseline     Past Medical History:   Diagnosis Date    CHF (congestive heart failure)     Dyslipidemia     Hyperlipidemia     Hypertension        Past Surgical History:   Procedure Laterality Date    HYSTERECTOMY      tonsils         Review of patient's allergies indicates:  No Known Allergies    No current facility-administered medications on file prior to encounter.      Current Outpatient Medications on File Prior to Encounter   Medication Sig    amlodipine (NORVASC) 10 MG tablet     ANTIOX #11/OM3/DHA/EPA/LUT/SATYA (OCUVITE ADULT 50+ ORAL) Take by mouth.    aspirin (ECOTRIN) 325 MG EC tablet Take 325 mg by mouth once daily.    GARLIC ORAL Take by mouth.    gemfibrozil (LOPID) 600 MG tablet     HYDROCHLOROTHIAZIDE ORAL Take 12.5 mg by mouth.    lisinopril (PRINIVIL,ZESTRIL) 20 MG tablet Take 20 mg by mouth once daily.    metoprolol succinate (TOPROL-XL) 25 MG 24 hr tablet Take 25 mg by mouth once daily.    potassium chloride (K-TAB) 20 mEq Take by mouth once.    simvastatin (ZOCOR) 20 MG tablet     aspirin (ECOTRIN) 81 MG EC tablet Take 81 mg by mouth once daily.     Family History     Problem Relation (Age of Onset)    Heart disease Father        Tobacco Use    Smoking status: Never Smoker    Smokeless tobacco: Never Used   Substance and Sexual Activity    Alcohol use: No    Drug use: Never    Sexual activity: Not on file     Review of Systems   Constitutional: Positive for activity change. Negative for appetite change, chills, fatigue, fever and unexpected weight change.   HENT: Negative for congestion, ear discharge, ear pain, facial swelling and sinus pain.    Eyes:  Negative for pain, discharge, redness and itching.   Respiratory: Negative for cough, choking, chest tightness, shortness of breath and wheezing.    Cardiovascular: Negative for chest pain, palpitations and leg swelling.   Gastrointestinal: Negative for abdominal distention, abdominal pain, anal bleeding, blood in stool, constipation, diarrhea, nausea and vomiting.   Endocrine: Negative for cold intolerance.   Genitourinary: Positive for pelvic pain. Negative for dysuria, flank pain, frequency and hematuria.   Musculoskeletal: Positive for arthralgias (right pelvic pain with movement and ambulation) and gait problem (since fall -). Negative for back pain, joint swelling, neck pain and neck stiffness.   Neurological: Negative for dizziness, syncope, speech difficulty, weakness, light-headedness and headaches.   Hematological: Negative for adenopathy.   Psychiatric/Behavioral: Negative for agitation, confusion, dysphoric mood and suicidal ideas.     Objective:     Vital Signs (Most Recent):  Temp: 98.4 °F (36.9 °C) (12/26/19 1058)  Pulse: 98 (12/26/19 1332)  Resp: 18 (12/26/19 1058)  BP: 120/73 (12/26/19 1332)  SpO2: (Abnormal) 93 % (12/26/19 1332) Vital Signs (24h Range):  Temp:  [98.4 °F (36.9 °C)] 98.4 °F (36.9 °C)  Pulse:  [] 98  Resp:  [18] 18  SpO2:  [93 %-100 %] 93 %  BP: (104-127)/(57-84) 120/73        There is no height or weight on file to calculate BMI.    Physical Exam   Constitutional: She is oriented to person, place, and time. She appears well-developed and well-nourished.   thin built    HENT:   Head: Normocephalic and atraumatic.   Mouth/Throat: Oropharynx is clear and moist. No oropharyngeal exudate.   Eyes: Pupils are equal, round, and reactive to light. Conjunctivae and EOM are normal. Right eye exhibits no discharge. Left eye exhibits no discharge.   diminished vision left eye   Neck: Normal range of motion. Neck supple. No JVD present. No tracheal deviation present. No thyromegaly present.    Cardiovascular: Normal rate, regular rhythm and normal heart sounds. Exam reveals no gallop and no friction rub.   No murmur heard.  Pulmonary/Chest: Effort normal and breath sounds normal. No stridor. No respiratory distress. She has no wheezes. She has no rales.   Abdominal: Soft. Bowel sounds are normal. She exhibits no distension and no mass. There is no tenderness. There is no guarding.   Musculoskeletal: Normal range of motion. She exhibits no edema.   diminished ROM  right hip - attributes to pain in right pelvis    Lymphadenopathy:     She has no cervical adenopathy.   Neurological: She is oriented to person, place, and time. No cranial nerve deficit.   Skin: Skin is warm and dry.   right hip and gluteal bruise   Psychiatric: She has a normal mood and affect.   Nursing note and vitals reviewed.        CRANIAL NERVES     CN III, IV, VI   Pupils are equal, round, and reactive to light.  Extraocular motions are normal.       Significant Labs:   A1C: No results for input(s): HGBA1C in the last 4320 hours.  ABGs: No results for input(s): PH, PCO2, HCO3, POCSATURATED, BE, TOTALHB, COHB, METHB, O2HB, POCFIO2 in the last 48 hours.  Bilirubin:   Recent Labs   Lab 12/21/19  1920 12/26/19  1152   BILITOT 0.4 0.7     Blood Culture: No results for input(s): LABBLOO in the last 48 hours.  BMP:   Recent Labs   Lab 12/26/19  1152   *   *   K 3.1*   CL 97   CO2 23   BUN 23   CREATININE 0.8   CALCIUM 10.0   MG 2.2     CBC:   Recent Labs   Lab 12/26/19  1152   WBC 8.92   HGB 10.4*   HCT 31.0*        CMP:   Recent Labs   Lab 12/26/19  1152   *   K 3.1*   CL 97   CO2 23   *   BUN 23   CREATININE 0.8   CALCIUM 10.0   PROT 7.0   ALBUMIN 3.7   BILITOT 0.7   ALKPHOS 109   AST 24   ALT 12   ANIONGAP 13   EGFRNONAA >60.0     Cardiac Markers: No results for input(s): CKMB, MYOGLOBIN, BNP, TROPISTAT in the last 48 hours.  Coagulation:   Recent Labs   Lab 12/26/19  1152   INR 1.0     Lactic Acid: No  results for input(s): LACTATE in the last 48 hours.  Lipase: No results for input(s): LIPASE in the last 48 hours.  Lipid Panel: No results for input(s): CHOL, HDL, LDLCALC, TRIG, CHOLHDL in the last 48 hours.  Magnesium:   Recent Labs   Lab 12/26/19  1152   MG 2.2     Pathology Results  (Last 10 years)    None        POCT Glucose: No results for input(s): POCTGLUCOSE in the last 48 hours.  Prealbumin: No results for input(s): PREALBUMIN in the last 48 hours.  Respiratory Culture: No results for input(s): GSRESP, RESPIRATORYC in the last 48 hours.  Troponin:   Recent Labs   Lab 12/26/19  1152   TROPONINI 0.068*     TSH:   Recent Labs   Lab 12/26/19  1152   TSH 0.934     Urine Culture: No results for input(s): LABURIN in the last 48 hours.  Urine Studies: No results for input(s): COLORU, APPEARANCEUA, PHUR, SPECGRAV, PROTEINUA, GLUCUA, KETONESU, BILIRUBINUA, OCCULTUA, NITRITE, UROBILINOGEN, LEUKOCYTESUR, RBCUA, WBCUA, BACTERIA, SQUAMEPITHEL, HYALINECASTS in the last 48 hours.    Invalid input(s): WRIGHTSUR  All pertinent labs within the past 24 hours have been reviewed.    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  No fracture dislocation bone destruction seen.      Electronically signed by: Javier Zamora MD  Date:    12/26/2019  Time:    13:18                           X-Ray Hip 2 View Right (Final result)  Result time 12/26/19 13:18:18    Final result by Javier Zamora III, MD (12/26/19 13:18:18)             Narrative:    EXAMINATION:  XR HIP 2 VIEW RIGHT    CLINICAL HISTORY:  fall;    FINDINGS:  Two views right: There are possible  fractures of the right superior and inferior pubic ramus.  There is baseline DJD.  No femur fracture seen.  CT could be helpful.  There could be an acetabular fracture as well.      Electronically signed by: Javier Zamora MD  Date:    12/26/2019  Time:    13:18                           X-Ray Chest AP Portable (Final result)  Result time 12/26/19 13:07:03    Final result by Perla Rutledge MD (12/26/19 13:07:03)             Impression:      No source for chest pain established on this study.      Electronically signed by: Perla Rutledge MD  Date:    12/26/2019  Time:    13:07           Narrative:    EXAMINATION:  XR CHEST AP PORTABLE    CLINICAL HISTORY:  Chest Pain;    TECHNIQUE:  Single frontal view of the chest was performed.    COMPARISON:  12/16/2015.    FINDINGS:  The patient has thoracic dextrocurvature.  Allowing for this, mediastinal structures are midline.  Descending thoracic aorta is tortuous and atherosclerotic.  There is also abundant calcific atherosclerosis in the proximal right common carotid artery.    Allowing for calcified costochondral cartilage I detect no acute intrathoracic disease.  The source of the patient's chest pain is not established on this study.    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    I detect no pneumothorax, pneumomediastinum or pneumoperitoneum.    Advanced degenerative changes are present at the shoulders in this 89-year-old woman.                            EKG - Sinus tachycardia with frequent Premature atrial complexes    Assessment/Plan:     Active Diagnoses:    Diagnosis Date Noted POA    PRINCIPAL PROBLEM:  Closed fracture of ramus of right pubis [S32.591A]. mentions that fell 2 days ago on her way out of bathroom  She states that she tripped and fell, striking her right forearm on a door and her right hip on a wood floor. denies palpitations, lightheadedness prior to fall. denies head trauma or loss of consciousness.  X ray  right hip - possible fractures of the right superior and inferior pubic ramus   CT pelvis without contrast - Fractures involving the right superior and inferior pubic rami and right sacrum with associated right nayeli-pelvic hematoma as detailed above. orthopedic surgery consulted  12/26/2019 Unknown    New onset atrial fibrillation ?[I48.91]EKG - Sinus tachycardia with frequent Premature atrial complexes  while in the ER , upon placing the patient on a cardiac monitor, it is noted with tachardia -  atrial fibrillation with rapid ventricular response? at 140s improved to 100s after IV Metoprolol 5mg x 1.  she states that she has been told in the past that she has some irregular heartbeat.  However, she is not currently being treated for it, prior cardiologist offered her nuclear stress test but patient refused it.  continue Metoprolol as needed. discussed with cardiology. no evidence of afib per EKG in the hospital   12/26/2019 Yes    Fall [W19.XXXA]fall precautions 12/26/2019 Yes    Hyponatremia [E87.1]sodium at 133. monitor 12/26/2019 Unknown    Anemia [D64.9]Hb 13.3 to 10.4 overlast 3 days.blood loss from skin tear RUE vs bruise secondary to fall.monitor.hold ASA for now  12/26/2019 Unknown    Hypertension [I10]blood pressure controlled off medications 09/25/2013 Yes    Sinus arrhythmia [I49.8]as above  09/25/2013 Yes    Diastolic dysfunction [I51.89]does not seem to be decompensated. I/O  Monitor  09/25/2013 Yes    Hypokalemia [E87.6]3.1 replaced 09/25/2013 Yes      Problems Resolved During this Admission:     VTE Risk Mitigation (From admission, onward)         Ordered     IP VTE HIGH RISK PATIENT  Once      12/26/19 1437     Place sequential compression device  Until discontinued      12/26/19 1437                  Larry Boateng MD  Department of Hospital Medicine   [facility]

## 2019-12-26 NOTE — ED NOTES
Rt upper arm skin tear  Dressing changed  And  Pressure dressing applied on top of mepalex. Dr Hill aware.

## 2019-12-27 PROBLEM — R23.9 ALTERATION IN SKIN INTEGRITY: Status: ACTIVE | Noted: 2019-12-27

## 2019-12-27 LAB
ALBUMIN SERPL BCP-MCNC: 3 G/DL (ref 3.5–5.2)
ALBUMIN SERPL BCP-MCNC: 3 G/DL (ref 3.5–5.2)
ALP SERPL-CCNC: 84 U/L (ref 55–135)
ALP SERPL-CCNC: 84 U/L (ref 55–135)
ALT SERPL W/O P-5'-P-CCNC: 10 U/L (ref 10–44)
ALT SERPL W/O P-5'-P-CCNC: 10 U/L (ref 10–44)
ANION GAP SERPL CALC-SCNC: 9 MMOL/L (ref 8–16)
ANION GAP SERPL CALC-SCNC: 9 MMOL/L (ref 8–16)
AST SERPL-CCNC: 21 U/L (ref 10–40)
AST SERPL-CCNC: 21 U/L (ref 10–40)
BASOPHILS # BLD AUTO: 0.03 K/UL (ref 0–0.2)
BASOPHILS NFR BLD: 0.3 % (ref 0–1.9)
BILIRUB SERPL-MCNC: 0.7 MG/DL (ref 0.1–1)
BILIRUB SERPL-MCNC: 0.7 MG/DL (ref 0.1–1)
BUN SERPL-MCNC: 23 MG/DL (ref 10–30)
BUN SERPL-MCNC: 23 MG/DL (ref 10–30)
CALCIUM SERPL-MCNC: 8.9 MG/DL (ref 8.7–10.5)
CALCIUM SERPL-MCNC: 8.9 MG/DL (ref 8.7–10.5)
CHLORIDE SERPL-SCNC: 100 MMOL/L (ref 95–110)
CHLORIDE SERPL-SCNC: 100 MMOL/L (ref 95–110)
CO2 SERPL-SCNC: 22 MMOL/L (ref 23–29)
CO2 SERPL-SCNC: 22 MMOL/L (ref 23–29)
CREAT SERPL-MCNC: 0.8 MG/DL (ref 0.5–1.4)
CREAT SERPL-MCNC: 0.8 MG/DL (ref 0.5–1.4)
DIFFERENTIAL METHOD: ABNORMAL
EOSINOPHIL # BLD AUTO: 0 K/UL (ref 0–0.5)
EOSINOPHIL NFR BLD: 0.2 % (ref 0–8)
EOSINOPHIL NFR BLD: 0.3 % (ref 0–8)
EOSINOPHIL NFR BLD: 0.3 % (ref 0–8)
ERYTHROCYTE [DISTWIDTH] IN BLOOD BY AUTOMATED COUNT: 12.8 % (ref 11.5–14.5)
ERYTHROCYTE [DISTWIDTH] IN BLOOD BY AUTOMATED COUNT: 13.1 % (ref 11.5–14.5)
ERYTHROCYTE [DISTWIDTH] IN BLOOD BY AUTOMATED COUNT: 13.1 % (ref 11.5–14.5)
EST. GFR  (AFRICAN AMERICAN): >60 ML/MIN/1.73 M^2
EST. GFR  (AFRICAN AMERICAN): >60 ML/MIN/1.73 M^2
EST. GFR  (NON AFRICAN AMERICAN): >60 ML/MIN/1.73 M^2
EST. GFR  (NON AFRICAN AMERICAN): >60 ML/MIN/1.73 M^2
GLUCOSE SERPL-MCNC: 149 MG/DL (ref 70–110)
GLUCOSE SERPL-MCNC: 149 MG/DL (ref 70–110)
HCT VFR BLD AUTO: 25.4 % (ref 37–48.5)
HCT VFR BLD AUTO: 27.3 % (ref 37–48.5)
HCT VFR BLD AUTO: 27.3 % (ref 37–48.5)
HGB BLD-MCNC: 8.4 G/DL (ref 12–16)
HGB BLD-MCNC: 9.2 G/DL (ref 12–16)
HGB BLD-MCNC: 9.2 G/DL (ref 12–16)
IMM GRANULOCYTES # BLD AUTO: 0.04 K/UL (ref 0–0.04)
IMM GRANULOCYTES # BLD AUTO: 0.05 K/UL (ref 0–0.04)
IMM GRANULOCYTES # BLD AUTO: 0.05 K/UL (ref 0–0.04)
IMM GRANULOCYTES NFR BLD AUTO: 0.4 % (ref 0–0.5)
IMM GRANULOCYTES NFR BLD AUTO: 0.5 % (ref 0–0.5)
IMM GRANULOCYTES NFR BLD AUTO: 0.5 % (ref 0–0.5)
LYMPHOCYTES # BLD AUTO: 1.9 K/UL (ref 1–4.8)
LYMPHOCYTES # BLD AUTO: 2 K/UL (ref 1–4.8)
LYMPHOCYTES # BLD AUTO: 2 K/UL (ref 1–4.8)
LYMPHOCYTES NFR BLD: 19 % (ref 18–48)
LYMPHOCYTES NFR BLD: 21 % (ref 18–48)
LYMPHOCYTES NFR BLD: 21 % (ref 18–48)
MAGNESIUM SERPL-MCNC: 1.9 MG/DL (ref 1.6–2.6)
MCH RBC QN AUTO: 32.8 PG (ref 27–31)
MCH RBC QN AUTO: 33.6 PG (ref 27–31)
MCH RBC QN AUTO: 33.6 PG (ref 27–31)
MCHC RBC AUTO-ENTMCNC: 33.1 G/DL (ref 32–36)
MCHC RBC AUTO-ENTMCNC: 33.7 G/DL (ref 32–36)
MCHC RBC AUTO-ENTMCNC: 33.7 G/DL (ref 32–36)
MCV RBC AUTO: 100 FL (ref 82–98)
MCV RBC AUTO: 100 FL (ref 82–98)
MCV RBC AUTO: 99 FL (ref 82–98)
MONOCYTES # BLD AUTO: 1.2 K/UL (ref 0.3–1)
MONOCYTES # BLD AUTO: 1.3 K/UL (ref 0.3–1)
MONOCYTES # BLD AUTO: 1.3 K/UL (ref 0.3–1)
MONOCYTES NFR BLD: 12.3 % (ref 4–15)
MONOCYTES NFR BLD: 14.5 % (ref 4–15)
MONOCYTES NFR BLD: 14.5 % (ref 4–15)
NEUTROPHILS # BLD AUTO: 5.9 K/UL (ref 1.8–7.7)
NEUTROPHILS # BLD AUTO: 5.9 K/UL (ref 1.8–7.7)
NEUTROPHILS # BLD AUTO: 6.8 K/UL (ref 1.8–7.7)
NEUTROPHILS NFR BLD: 63.4 % (ref 38–73)
NEUTROPHILS NFR BLD: 63.4 % (ref 38–73)
NEUTROPHILS NFR BLD: 67.8 % (ref 38–73)
NRBC BLD-RTO: 0 /100 WBC
PHOSPHATE SERPL-MCNC: 2.7 MG/DL (ref 2.7–4.5)
PLATELET # BLD AUTO: 178 K/UL (ref 150–350)
PLATELET # BLD AUTO: 195 K/UL (ref 150–350)
PLATELET # BLD AUTO: 195 K/UL (ref 150–350)
PMV BLD AUTO: 11.1 FL (ref 9.2–12.9)
PMV BLD AUTO: 11.1 FL (ref 9.2–12.9)
PMV BLD AUTO: 9.9 FL (ref 9.2–12.9)
POTASSIUM SERPL-SCNC: 3.5 MMOL/L (ref 3.5–5.1)
POTASSIUM SERPL-SCNC: 3.5 MMOL/L (ref 3.5–5.1)
PROT SERPL-MCNC: 6.1 G/DL (ref 6–8.4)
PROT SERPL-MCNC: 6.1 G/DL (ref 6–8.4)
RBC # BLD AUTO: 2.56 M/UL (ref 4–5.4)
RBC # BLD AUTO: 2.74 M/UL (ref 4–5.4)
RBC # BLD AUTO: 2.74 M/UL (ref 4–5.4)
SODIUM SERPL-SCNC: 131 MMOL/L (ref 136–145)
SODIUM SERPL-SCNC: 131 MMOL/L (ref 136–145)
TROPONIN I SERPL DL<=0.01 NG/ML-MCNC: 0.09 NG/ML (ref 0–0.03)
WBC # BLD AUTO: 10.08 K/UL (ref 3.9–12.7)
WBC # BLD AUTO: 9.27 K/UL (ref 3.9–12.7)
WBC # BLD AUTO: 9.27 K/UL (ref 3.9–12.7)

## 2019-12-27 PROCEDURE — 83735 ASSAY OF MAGNESIUM: CPT

## 2019-12-27 PROCEDURE — 25000003 PHARM REV CODE 250: Performed by: HOSPITALIST

## 2019-12-27 PROCEDURE — 20600001 HC STEP DOWN PRIVATE ROOM

## 2019-12-27 PROCEDURE — 97161 PT EVAL LOW COMPLEX 20 MIN: CPT

## 2019-12-27 PROCEDURE — 97165 OT EVAL LOW COMPLEX 30 MIN: CPT

## 2019-12-27 PROCEDURE — 84484 ASSAY OF TROPONIN QUANT: CPT

## 2019-12-27 PROCEDURE — 80053 COMPREHEN METABOLIC PANEL: CPT

## 2019-12-27 PROCEDURE — 36415 COLL VENOUS BLD VENIPUNCTURE: CPT

## 2019-12-27 PROCEDURE — 85025 COMPLETE CBC W/AUTO DIFF WBC: CPT

## 2019-12-27 PROCEDURE — 84100 ASSAY OF PHOSPHORUS: CPT

## 2019-12-27 PROCEDURE — 99232 SBSQ HOSP IP/OBS MODERATE 35: CPT | Mod: ,,, | Performed by: HOSPITALIST

## 2019-12-27 PROCEDURE — 99232 PR SUBSEQUENT HOSPITAL CARE,LEVL II: ICD-10-PCS | Mod: ,,, | Performed by: HOSPITALIST

## 2019-12-27 PROCEDURE — 25000003 PHARM REV CODE 250: Performed by: PHYSICIAN ASSISTANT

## 2019-12-27 RX ORDER — PANTOPRAZOLE SODIUM 40 MG/1
40 TABLET, DELAYED RELEASE ORAL DAILY
Status: DISCONTINUED | OUTPATIENT
Start: 2019-12-28 | End: 2020-01-02 | Stop reason: HOSPADM

## 2019-12-27 RX ORDER — IPRATROPIUM BROMIDE AND ALBUTEROL SULFATE 2.5; .5 MG/3ML; MG/3ML
3 SOLUTION RESPIRATORY (INHALATION) EVERY 4 HOURS PRN
Status: DISCONTINUED | OUTPATIENT
Start: 2019-12-27 | End: 2020-01-02 | Stop reason: HOSPADM

## 2019-12-27 RX ORDER — IBUPROFEN 400 MG/1
400 TABLET ORAL EVERY 6 HOURS PRN
Status: DISCONTINUED | OUTPATIENT
Start: 2019-12-27 | End: 2020-01-02 | Stop reason: HOSPADM

## 2019-12-27 RX ORDER — POTASSIUM CHLORIDE 750 MG/1
30 CAPSULE, EXTENDED RELEASE ORAL ONCE
Status: COMPLETED | OUTPATIENT
Start: 2019-12-27 | End: 2019-12-27

## 2019-12-27 RX ORDER — POLYETHYLENE GLYCOL 3350 17 G/17G
17 POWDER, FOR SOLUTION ORAL DAILY
Status: DISCONTINUED | OUTPATIENT
Start: 2019-12-28 | End: 2020-01-02 | Stop reason: HOSPADM

## 2019-12-27 RX ORDER — TALC
6 POWDER (GRAM) TOPICAL NIGHTLY PRN
Status: DISCONTINUED | OUTPATIENT
Start: 2019-12-27 | End: 2020-01-02 | Stop reason: HOSPADM

## 2019-12-27 RX ORDER — ACETAMINOPHEN 500 MG
1000 TABLET ORAL 3 TIMES DAILY
Status: DISCONTINUED | OUTPATIENT
Start: 2019-12-27 | End: 2020-01-02 | Stop reason: HOSPADM

## 2019-12-27 RX ORDER — BISACODYL 10 MG
10 SUPPOSITORY, RECTAL RECTAL DAILY PRN
Status: DISCONTINUED | OUTPATIENT
Start: 2019-12-27 | End: 2020-01-02 | Stop reason: HOSPADM

## 2019-12-27 RX ADMIN — ACETAMINOPHEN 1000 MG: 500 TABLET ORAL at 07:12

## 2019-12-27 RX ADMIN — POTASSIUM CHLORIDE 30 MEQ: 750 CAPSULE, EXTENDED RELEASE ORAL at 06:12

## 2019-12-27 RX ADMIN — IBUPROFEN 400 MG: 400 TABLET, FILM COATED ORAL at 09:12

## 2019-12-27 RX ADMIN — ACETAMINOPHEN 650 MG: 325 TABLET ORAL at 01:12

## 2019-12-27 NOTE — PROGRESS NOTES
Wound consult received on patient's skin tear to right upper arm.  Recommend following skin tear protocol. Wound dressed with foam dressing, recommend changing weekly.  Patient tolerated care well. Contact wound care for any further need or questions. Nursing to continue care.       12/27/19 1007        Wound 12/27/19 1007 Skin Tear upper Arm   Date First Assessed/Time First Assessed: 12/27/19 1007   Pre-existing: Yes  Primary Wound Type: Skin Tear  Side: Right  Orientation: upper  Location: Arm   Wound Image    Wound WDL ex   Drainage Amount Scant   Drainage Characteristics/Odor Serosanguineous;No odor   Appearance Moist;Red   Tissue loss description Partial thickness   Periwound Area Ecchymotic;Intact   Wound Length (cm) 8 cm   Wound Width (cm) 6 cm   Wound Depth (cm) 0.1 cm   Wound Volume (cm^3) 4.8 cm^3   Wound Surface Area (cm^2) 48 cm^2   Care Cleansed with:;Sterile normal saline   Dressing Changed;Applied;Foam   Dressing Change Due 01/03/20

## 2019-12-27 NOTE — PLAN OF CARE
Plan of care reviewed with patient and she states understanding. No acute events noted at this time. Patient remains free from injury. VS stable. Planning D/C to SNF. All questions and concerns addressed. Will continue to monitor.

## 2019-12-27 NOTE — PT/OT/SLP EVAL
Occupational Therapy   Evaluation    Name: Elisha Young  MRN: [id number]  Admitting Diagnosis:  Closed fracture of ramus of right pubis      Recommendations:     Discharge Recommendations: nursing facility, skilled  Discharge Equipment Recommendations:  none  Barriers to discharge:  Other (Comment)(Pt requires increased assistance for all mobility and ADLs. )    Assessment:     Elisha Young is a 89 y.o. female with a medical diagnosis of Closed fracture of ramus of right pubis.  She presents with performance deficits affecting function: weakness, impaired endurance, impaired self care skills, gait instability, impaired functional mobilty, impaired balance, decreased lower extremity function, pain. Pt is not safe to return to the home environment at current functional level and requires max A for bed mobility and max A for sit<>stand transfer from EOB. Pt would greatly benefit from SNF placement in order to ensure safe return to the home environment. Pt would benefit from continued skilled acute OT services in order to maximize independence and safety with ADLs and functional mobility to ensure safe return to PLOF in the least restrictive environment.       Rehab Prognosis: Good; patient would benefit from acute skilled OT services to address these deficits and reach maximum level of function.       Plan:     Patient to be seen 4 x/week to address the above listed problems via self-care/home management, therapeutic activities, therapeutic exercises  · Plan of Care Expires: 01/25/20  · Plan of Care Reviewed with: patient    Subjective     Chief Complaint: pain in R LE   Patient/Family Comments/goals: To return home     Occupational Profile:  Living Environment: Pt lives alone in a duplex with 5 YULY and R HR present. Pt's son lives next door to pt and works during the day. Pt has a tub/shower combo with shower chair and grab bars present.  Previous level of function: PTA, pt was (I) for ADLs and mod (I) for  "community mobility using RW and (I)<> mod (I) for household distances.   Roles and Routines: home dweller   Equipment Used at Home:  walker, rolling, shower chair, cane, straight, grab bar, rollator  Assistance upon Discharge: Pt has limited assistance from son during the day as he works.     Pain/Comfort:  · Pain Rating 1: (Pt initially reported no pain but reported increased pain with mobility )  · Location - Side 1: Right  · Location 1: (LE)  · Pain Addressed 1: Reposition, Distraction, Cessation of Activity    Patients cultural, spiritual, Mandaeism conflicts given the current situation: no    Objective:     Communicated with: RN prior to session.  Patient found HOB elevated with telemetry upon OT entry to room. Pt agreeable to therapy session. Co-eval with PT.  Pt stated, " I don't like [facility]."     General Precautions: Standard, fall   Orthopedic Precautions:RLE weight bearing as tolerated   Braces: N/A     Occupational Performance:    Bed Mobility:    · Patient completed Scooting/Bridging with maximal assistance   · Lateral seated scooting to the left with max A   · Total A for supine scooting towards EOB   · Patient completed Supine to Sit with maximal assistance and HOB elevated   · Patient completed Sit to Supine with minimum assistance and for trunk and bringing B LEs into bed     Functional Mobility/Transfers:  · Patient completed Sit <> Stand Transfer from EOB with maximal assistance  with  rolling walker   · Verbal cues for hand placement on bed and RW.  · Functional Mobility: Unable to perform due to increased pain in R LE when standing with RW.     Activities of Daily Living:  · Upper Body Dressing: moderate assistance to don gown like robe while seated EOB   · Lower Body Dressing: total assistance  pt unable to access B feet due to increased pain with mobility     Cognitive/Visual Perceptual:  Cognitive/Psychosocial Skills:     -       Oriented to: Person, Place, Time and Situation   -       " Follows Commands/attention:Follows multistep  commands  -       Communication: clear/fluent  -       Memory: No Deficits noted  -       Safety awareness/insight to disability: intact   -       Mood/Affect/Coping skills/emotional control: Appropriate to situation  Visual/Perceptual:      -wears glasses       Physical Exam:  Balance:    - Static sit: Supervision <> SBA  -  Dynamic sit: SBA  - Static standing: min A using RW - pt tolerated standing ~10 seconds   - Dynamic Standing: not performed     Postural examination/scapula alignment:    -       Rounded shoulders  -       Forward head  Skin integrity: Visible skin intact, Thin and bandage on R upper lateral portion of UE due to skin abrasion during fall at home  Edema:  None noted  Sensation:    -       Intact  Dominant hand:    -       right  Upper Extremity Range of Motion:     -       Right Upper Extremity: WFL  -       Left Upper Extremity: WFL  Upper Extremity Strength:    -       Right Upper Extremity: grossly 4/5   -       Left Upper Extremity: grossly 4/5     AMPAC 6 Click ADL:  AMPAC Total Score: 15    Treatment & Education:  - Pt educated on role of OT, POC, goals for therapy and therapy progression to ensure safe return back home  - D/c recs: SNF   - Time provided for therapeutic counseling and discussion of health disposition.   - Shades opened and lights turned on in room  - Importance of OOB ax's with staff member assistance and sitting OOB majority of day.   - Pt completed ADLs and functional mobility for treatment session as noted above   - Pt verbalized understanding. Pt expressed no further concerns/questions.  - whiteboard updated   Education:    Patient left HOB elevated with all lines intact, call button in reach and RN notified    GOALS:   Multidisciplinary Problems     Occupational Therapy Goals        Problem: Occupational Therapy Goal    Goal Priority Disciplines Outcome Interventions   Occupational Therapy Goal     OT, PT/OT Ongoing,  Progressing    Description:  Goals to be met by: 1/10/19     Patient will increase functional independence with ADLs by performing:    LE Dressing with Minimal Assistance using AD   Grooming while standing with Contact Guard Assistance.  Toileting from bedside commode with Minimal Assistance for hygiene and clothing management.   Toilet transfer to bedside commode with Minimal Assistance.                      History:     Past Medical History:   Diagnosis Date    CHF (congestive heart failure)     Dyslipidemia     Hyperlipidemia     Hypertension        Past Surgical History:   Procedure Laterality Date    HYSTERECTOMY      tonsils         Time Tracking:     OT Date of Treatment: 12/27/19  OT Start Time: 1023  OT Stop Time: 1044  OT Total Time (min): 21 min    Billable Minutes:Evaluation 21    Mary Anne Mendoza, OT  12/27/2019

## 2019-12-27 NOTE — PT/OT/SLP EVAL
"Physical Therapy Evaluation    Patient Name:  Elisha Young   MRN:  [id number]    Recommendations:     Discharge Recommendations:  nursing facility, skilled   Discharge Equipment Recommendations: none   Barriers to discharge: Inaccessible home and Decreased caregiver support (5 YULY; requires increased assist at this time)    Assessment:     Elisha Young is a 89 y.o. female admitted with a medical diagnosis of Closed fracture of ramus of right pubis.  She presents with the following impairments/functional limitations:  weakness, impaired functional mobilty, impaired endurance, gait instability, impaired balance, impaired self care skills, decreased lower extremity function, pain. Pt required increased assist to complete functional mobility this date. Performed static standing trial x1 but pt only able to tolerate briefly 2* increased LE pain. Unable to progress mobility further at this time 2* increased pain and weakness. Pt would continue to benefit from skilled acute PT in order to address current deficits and progress functional mobility.     Rehab Prognosis: Good; patient would benefit from acute skilled PT services to address these deficits and reach maximum level of function.    Recent Surgery: * No surgery found *      Plan:     During this hospitalization, patient to be seen 4 x/week to address the identified rehab impairments via gait training, therapeutic activities, therapeutic exercises, neuromuscular re-education and progress toward the following goals:    · Plan of Care Expires:  01/25/20    Subjective     Chief Complaint: LE pain with mobility   Patient/Family Comments/goals: decreased pain and return home  "I agreed to go to the rehab. I didn't want to do it at first."  Pain/Comfort:  · Pain Rating 1: (reported no pain at rest; reported increased LE pain with mobility)  · Pain Addressed 1: Reposition, Distraction, Cessation of Activity    Patients cultural, spiritual, Adventist conflicts given the " current situation: no    Living Environment:  Pt lives in a 1-story duplex with 5 YULY, R handrail; pt's son lives next door.   Prior to admission, patient primarily used RW for mobility, occasionally uses rollator. Pt was mod-I with ADLs PTA.  Equipment used at home: walker, rolling, rollator, shower chair, cane, straight, grab bar.   Upon discharge, patient will have assistance from son.    Objective:     Communicated with RN prior to session.  Patient found supine with telemetry  upon PT entry to room.    General Precautions: Standard, fall   Orthopedic Precautions:RLE weight bearing as tolerated   Braces: N/A     Exams:  · Cognitive Exam:  Patient is oriented to Person, Place, Time and Situation  · Sensation:    · -       Intact  · RLE ROM: WFL except decreased hip flex 2* pain  · RLE Strength: WFL except decreased hip flex 2* pain  · LLE ROM: WFL except decreased hip flex 2* pain  · LLE Strength: WFL except decreased hip flex 2* pain    Functional Mobility:  · Bed Mobility:     · Scooting: total assistance, supine with drawsheet to HOB; maxA, seated lateral scooting to L  · Supine to Sit: maximal assistance with side rail  · Sit to Supine: maximal assistance  · Transfers:     · Sit to Stand:  maximal assistance with rolling walker from EOB  · Cues provided for hand placement, transfer technique with RW, ant weight-shift, use of RW to assist with off-loading LE, and upright posture       Therapeutic Activities and Exercises:  Pt educated on role of PT and PT POC. Pt verbalized understanding.   Pt educated on ortho precautions for RLE WBAT. Pt v/u.  Pt educated on the effects of bed rest and the importance of elevating HOB during daytime hours. Pt v/u.  Pt educated on the importance of maintaining healthy sleep-wake cycles. Lights turned on and blinds opened for increased alertness during daytime hours.     AM-PAC 6 CLICK MOBILITY  Total Score:10     Patient left supine with HOB elevated with all lines intact,  call button in reach and RN notified.    GOALS:   Multidisciplinary Problems     Physical Therapy Goals        Problem: Physical Therapy Goal    Goal Priority Disciplines Outcome Goal Variances Interventions   Physical Therapy Goal     PT, PT/OT Ongoing, Progressing     Description:  Goals to be met by: 1/10/2020     Patient will increase functional independence with mobility by performin. Supine to sit with MInimal Assistance  2. Sit to stand transfer with Minimal Assistance  3. Bed to chair transfer with Minimal Assistance using Rolling Walker  4. Gait  x 25 feet with Minimal Assistance using Rolling Walker.  5. Lower extremity exercise program x15 reps, with supervision, in order to increase LE strength and (I) with functional mobility.                        History:     Past Medical History:   Diagnosis Date    CHF (congestive heart failure)     Dyslipidemia     Hyperlipidemia     Hypertension        Past Surgical History:   Procedure Laterality Date    HYSTERECTOMY      tonsils         Time Tracking:     PT Received On: 19  PT Start Time: 1027     PT Stop Time: 1044  PT Total Time (min): 17 min     Billable Minutes: Evaluation 17  (co-eval with OT)    Corie Morales, PT, DPT   2019  [phone]

## 2019-12-27 NOTE — PLAN OF CARE
Problem: Occupational Therapy Goal  Goal: Occupational Therapy Goal  Description  Goals to be met by: 1/10/19     Patient will increase functional independence with ADLs by performing:    LE Dressing with Minimal Assistance using AD   Grooming while standing with Contact Guard Assistance.  Toileting from bedside commode with Minimal Assistance for hygiene and clothing management.   Toilet transfer to bedside commode with Minimal Assistance.     Outcome: Ongoing, Progressing     Mary Anne Mendoza, OTR/L  Pager: [phone]  12/27/2019

## 2019-12-27 NOTE — HPI
"Elisha Young is a 89 y.o. female with PMHx diastolic dysfunction, HTN, who presented to ED today due to right hip pain after mechanical fall from recliner chair on Tuesday. The patient states she lost her balance in her chair and fell directly on her right hip. In addition, her right lateral arm was cut by the metal door lock causing a large superficial skin tear. The patient states that after she fell, she was able to get up and ambulate, however it was painful. The next 2 days thereafter, the patient was able to ambulate with significant pain. She also noticed a large "knot" on the lateral aspect of her right proximal thigh from the fall.She states she wanted to wait until after [place] to go to the hospital. The patient lives alone in a 1st floor duplex with her son living in the next unit. The patient states she was fully independent prior to this injury, though her son checks on her everyday. The patient walks without a walker at home and is a community ambulator with walker.  "

## 2019-12-27 NOTE — CONSULTS
"[facility]  Orthopedics  Consult Note    Patient Name: Elisha Young  MRN: [id number]  Admission Date: 12/26/2019  Hospital Length of Stay: 0 days  Attending Provider: Larry Boateng MD  Primary Care Provider: Marky García MD    Patient information was obtained from patient and ER records.     Inpatient consult to Orthopedics  Consult performed by: Robert Wang MD  Consult ordered by: Larry Boateng MD    Inpatient consult to Orthopedic Surgery  Consult performed by: Robert Wang MD  Consult ordered by: Kenji Hill MD        Subjective:     Principal Problem:Closed fracture of ramus of right pubis    Chief Complaint:   Chief Complaint   Patient presents with    Hip Pain     Right hip pain s/p falll on Tuesday night. Pt having difficulty with ambulation. Abrasion noted to right upper arm.         HPI: Elisha Young is a 89 y.o. female with PMHx diastolic dysfunction, HTN, who presented to ED today due to right hip pain after mechanical fall from recliner chair on Tuesday. The patient states she lost her balance in her chair and fell directly on her right hip. In addition, her right lateral arm was cut by the metal door lock causing a large superficial skin tear. The patient states that after she fell, she was able to get up and ambulate, however it was painful. The next 2 days thereafter, the patient was able to ambulate with significant pain. She also noticed a large "knot" on the lateral aspect of her right proximal thigh from the fall.She states she wanted to wait until after [place] to go to the hospital. The patient lives alone in a 1st floor duplex with her son living in the next unit. The patient states she was fully independent prior to this injury, though her son checks on her everyday. The patient walks without a walker at home and is a community ambulator with walker.    Past Medical History:   Diagnosis Date    CHF (congestive heart failure)     Dyslipidemia  " "   Hyperlipidemia     Hypertension        Past Surgical History:   Procedure Laterality Date    HYSTERECTOMY      tonsils         Review of patient's allergies indicates:  No Known Allergies    Current Facility-Administered Medications   Medication    acetaminophen tablet 650 mg    aspirin tablet 325 mg    bacitracin ointment 1 Tube    dextrose 10% (D10W) Bolus    dextrose 10% (D10W) Bolus    glucagon (human recombinant) injection 1 mg    glucose chewable tablet 16 g    glucose chewable tablet 24 g    ondansetron injection 4 mg    sodium chloride 0.9% flush 10 mL    sodium chloride 0.9% flush 10 mL     Family History     Problem Relation (Age of Onset)    Heart disease Father        Tobacco Use    Smoking status: Never Smoker    Smokeless tobacco: Never Used   Substance and Sexual Activity    Alcohol use: No    Drug use: Never    Sexual activity: Not on file     [facility] ED  Objective:     Vital Signs (Most Recent):  Temp: 98.9 °F (37.2 °C) (12/26/19 1940)  Pulse: 101 (12/26/19 1940)  Resp: 16 (12/26/19 1940)  BP: (!) 108/59 (12/26/19 1940)  SpO2: 97 % (12/26/19 1940) Vital Signs (24h Range):  Temp:  [98.4 °F (36.9 °C)-98.9 °F (37.2 °C)] 98.9 °F (37.2 °C)  Pulse:  [] 101  Resp:  [16-18] 16  SpO2:  [91 %-100 %] 97 %  BP: (104-129)/(57-84) 108/59     Weight: 51.1 kg (112 lb 10.5 oz)  Height: 5' 2" (157.5 cm)  Body mass index is 20.6 kg/m².    No intake or output data in the 24 hours ending 12/26/19 2007    Ortho/SPM Exam   RUE:  Superficial skin tear to lateral aspect of arm wrapped in 4x4 and coban dressing  FROM shoulder, elbow and wrist  SILT M/U/R  Motor intact AIN/PIN/M/U/R   Cap refill < 2s  2+ RP    RLE:  Skin intact  3cm non-tender hematoma over anterolateral thigh  No TTP  Denies pain with log roll  Compartments soft  FROM at hip with some pain  SILT Sa/Ahumada/DP/SP/T  Motor intact EHL/FHL/TA/Gastroc  Palpable PT and DP pulses  Brisk capillary refill    All other joints " (shoulder/elbow/wrist/hip/knee/ankle) were examined and had full ROM and were non-tender to palpation.            Significant Labs: All pertinent labs within the past 24 hours have been reviewed.    Significant Imaging: I have reviewed all pertinent imaging results/findings.  Minimally displaced right superior and inferior pubic rami fractures, and nondisplaced sacral fracture    Assessment/Plan:     * Closed fracture of ramus of right pubis  Elisha Young is a 89 y.o. female with minimally displaced right superior and inferior pubic ramus fractures, closed, NVI.    -Admit to Hospital Medicine  -WBAT to RLE  -No orthopaedic surgical intervention at this time. The fractures are very likely amenable to non-operative treatment, particularly considering the patients advanced age.  -PT/OT evaluation and recommendations.  -This patient will likely benefit from placement in IP Rehab vs. SNF to work with PT, however at this time, the patient is adamantly refusing any such arrangement and wishes to go home following hospital workup.               Thank you for your consult. I will follow-up with patient. Please contact us if you have any additional questions.    Robert Wang MD  Orthopedics  [facility]

## 2019-12-27 NOTE — ASSESSMENT & PLAN NOTE
Elisha Young is a 89 y.o. female with minimally displaced right superior and inferior pubic ramus fractures, closed, NVI.    -Admit to Hospital Medicine  -WBAT to RLE  -No orthopaedic surgical intervention at this time. The fractures are very likely amenable to non-operative treatment, particularly considering the patients advanced age.  -PT/OT evaluation and recommendations.  -This patient will likely benefit from placement in IP Rehab vs. SNF to work with PT, however at this time, the patient is adamantly refusing any such arrangement and wishes to go home following hospital workup. We will discuss the plan again with her and her family tomorrow following evaluation by PT/OT.

## 2019-12-27 NOTE — SUBJECTIVE & OBJECTIVE
"Past Medical History:   Diagnosis Date    CHF (congestive heart failure)     Dyslipidemia     Hyperlipidemia     Hypertension        Past Surgical History:   Procedure Laterality Date    HYSTERECTOMY      tonsils         Review of patient's allergies indicates:  No Known Allergies    Current Facility-Administered Medications   Medication    acetaminophen tablet 650 mg    aspirin tablet 325 mg    bacitracin ointment 1 Tube    dextrose 10% (D10W) Bolus    dextrose 10% (D10W) Bolus    glucagon (human recombinant) injection 1 mg    glucose chewable tablet 16 g    glucose chewable tablet 24 g    ondansetron injection 4 mg    sodium chloride 0.9% flush 10 mL    sodium chloride 0.9% flush 10 mL     Family History     Problem Relation (Age of Onset)    Heart disease Father        Tobacco Use    Smoking status: Never Smoker    Smokeless tobacco: Never Used   Substance and Sexual Activity    Alcohol use: No    Drug use: Never    Sexual activity: Not on file     [facility] ED  Objective:     Vital Signs (Most Recent):  Temp: 98.9 °F (37.2 °C) (12/26/19 1940)  Pulse: 101 (12/26/19 1940)  Resp: 16 (12/26/19 1940)  BP: (!) 108/59 (12/26/19 1940)  SpO2: 97 % (12/26/19 1940) Vital Signs (24h Range):  Temp:  [98.4 °F (36.9 °C)-98.9 °F (37.2 °C)] 98.9 °F (37.2 °C)  Pulse:  [] 101  Resp:  [16-18] 16  SpO2:  [91 %-100 %] 97 %  BP: (104-129)/(57-84) 108/59     Weight: 51.1 kg (112 lb 10.5 oz)  Height: 5' 2" (157.5 cm)  Body mass index is 20.6 kg/m².    No intake or output data in the 24 hours ending 12/26/19 2007    Ortho/SPM Exam   RUE:  Superficial skin tear to lateral aspect of arm wrapped in 4x4 and coban dressing  FROM shoulder, elbow and wrist  SILT M/U/R  Motor intact AIN/PIN/M/U/R   Cap refill < 2s  2+ RP    RLE:  Skin intact  3cm non-tender hematoma over anterolateral thigh  No TTP  Denies pain with log roll  Compartments soft  FROM at hip with some pain  SILT Sa/Ahumada/DP/SP/T  Motor intact " EHL/FHL/TA/Gastroc  Palpable PT and DP pulses  Brisk capillary refill    All other joints (shoulder/elbow/wrist/hip/knee/ankle) were examined and had full ROM and were non-tender to palpation.            Significant Labs: All pertinent labs within the past 24 hours have been reviewed.    Significant Imaging: I have reviewed all pertinent imaging results/findings.  Minimally displaced right superior and inferior pubic rami fractures, and nondisplaced sacral fracture

## 2019-12-27 NOTE — PLAN OF CARE
Marky García MD       [id number] - [place], [place] - [address] AT [place] & [address]  [address]  [facility] [address]  Phone: [phone] Fax: [phone]    Payor: MEDICARE / Plan: MEDICARE PART A & B / Product Type: [facility] /       12/27/19 1515   Discharge Assessment   Assessment Type Discharge Planning Assessment   Confirmed/corrected address and phone number on facesheet? Yes   Assessment information obtained from? Patient   Expected Length of Stay (days) 2   Communicated expected length of stay with patient/caregiver yes   Prior to hospitilization cognitive status: Alert/Oriented   Prior to hospitalization functional status: Independent   Current cognitive status: Alert/Oriented   Current Functional Status: Independent   Lives With alone  (Son lives next door.)   Able to Return to Prior Arrangements yes   Is patient able to care for self after discharge? Yes   Who are your caregiver(s) and their phone number(s)? Bull Young Jr.   Patient's perception of discharge disposition skilled nursing facility   Readmission Within the Last 30 Days no previous admission in last 30 days   Patient currently being followed by outpatient case management? No   Patient currently receives any other outside agency services? No   Equipment Currently Used at Home walker, rolling   Do you have any problems affording any of your prescribed medications? No   Is the patient taking medications as prescribed? yes   Does the patient have transportation home? Yes   Transportation Anticipated family or friend will provide   Dialysis Name and Scheduled days N/A   Does the patient receive services at the [facility]mad[facility] Clinic? No   Discharge Plan A Skilled Nursing Facility   Discharge Plan B Skilled Nursing Facility   DME Needed Upon Discharge    (TBD)   Patient/Family in Agreement with Plan yes

## 2019-12-27 NOTE — PLAN OF CARE
Patient remains free of falls or injury. Patient denies pain. Ortho c/s completed for hip pain; no surgical intervention at this time. Patient refusing rehab placement. Patient working with PT/OT. Plan of care reviewed with patient.

## 2019-12-27 NOTE — PLAN OF CARE
12/27/19 0852   Post-Acute Status   Post-Acute Authorization Placement   Post-Acute Placement Status Awaiting Therapy Documentation     PT was working with Pt when Sw arrived, Sw to return with SNF list to provide to Pt and family for options. List provided to Pt, son to arrive at 3pm and Sw to follow with choices. Family reviewing list, Sw to follow with choices.

## 2019-12-27 NOTE — PROGRESS NOTES
Progress Note  Hospital Medicine    Admit Date: 12/26/2019  Length of Stay:  LOS: 1 day     SUBJECTIVE:         Follow-up For:  Closed fracture of ramus of right pubis    HPI/Interval history (See H&P for complete P,F,SHx) :     Elisha Young is a 89 y.o. female with a PMH of HTN, HLD, Diastolic dysfunction , sinus arrhythmia  who presented to the ED on 12/26/2019 diagnosed with  Hip Pain (Right hip pain s/p falll on Tuesday night. Pt having difficulty with ambulation. Abrasion noted to right upper arm. )  AAOX 3, accompanied by son and grand daughter . mentions that fell 2 days ago on her way out of bathroom  She states that she tripped and fell, striking her right forearm on a door and her right hip on a wood floor. denies palpitations, lightheadedness prior to fall. denies head trauma or loss of consciousness.  Since then, able to get herself her up to the chair and informed her son. she has been able to ambulate with discomfort, refused to come to ER due to madelin.  She reports an associated bruise to the right hip area.  Upon striking the door with her right arm, she sustained a skin tear which has been actively bleeding since the fall.  Her family has been bandaging this with some relief of her bleeding.  while in the ER , upon placing the patient on a cardiac monitor, it is noted with tachardia -  atrial fibrillation with rapid ventricular response? at 140s improved to 100s after IV Metoprolol 5mg x 1.  she states that she has been told in the past that she has some irregular heartbeat.  However, she is not currently being treated for it, prior cardiologist offered her nuclear stress test but patient refused it.  She denies any numbness, weakness, dizziness, chest pain, shortness of breath, or palpitations.  She denies any other complaints.     lives alone in [facility]. ADL/IADL independent at baseline      12/27/2019  Status post orthopedic surgery evaluation - WBAT to RLE. No orthopaedic surgical  intervention at this time. The fractures are very likely amenable to non-operative treatment, particularly considering the patients advanced age. \ patient will likely benefit from placement in IP Rehab vs. SNF to work with PT, however at this time, the patient is adamantly refusing any such arrangement and wishes to go home following hospital workup.           Review of Systems:   Pain scale: Constitutional: Positive for activity change. Negative for appetite change, chills, fatigue, fever and unexpected weight change.   HENT: Negative for congestion, ear discharge, ear pain, facial swelling and sinus pain.    Eyes: Negative for pain, discharge, redness and itching.   Respiratory: Negative for cough, choking, chest tightness, shortness of breath and wheezing.    Cardiovascular: Negative for chest pain, palpitations and leg swelling.   Gastrointestinal: Negative for abdominal distention, abdominal pain, anal bleeding, blood in stool, constipation, diarrhea, nausea and vomiting.   Endocrine: Negative for cold intolerance.   Genitourinary: Positive for pelvic pain. Negative for dysuria, flank pain, frequency and hematuria.   Musculoskeletal: Positive for arthralgias (right pelvic pain with movement and ambulation) and gait problem (since fall -). Negative for back pain, joint swelling, neck pain and neck stiffness.   Neurological: Negative for dizziness, syncope, speech difficulty, weakness, light-headedness and headaches.   Hematological: Negative for adenopathy.   Psychiatric/Behavioral: Negative for agitation, confusion, dysphoric mood and suicidal ideas.     OBJECTIVE:     Vital Signs Range (Last 24H):  Temp:  [98.4 °F (36.9 °C)-99.3 °F (37.4 °C)]   Pulse:  []   Resp:  [16-18]   BP: ()/(53-84)   SpO2:  [90 %-100 %]     Physical Exam:  Constitutional: She is oriented to person, place, and time. She appears well-developed and well-nourished.   thin built    HENT:   Head: Normocephalic and atraumatic.    Mouth/Throat: Oropharynx is clear and moist. No oropharyngeal exudate.   Eyes: Pupils are equal, round, and reactive to light. Conjunctivae and EOM are normal. Right eye exhibits no discharge. Left eye exhibits no discharge.   diminished vision left eye   Neck: Normal range of motion. Neck supple. No JVD present. No tracheal deviation present. No thyromegaly present.   Cardiovascular: Normal rate, regular rhythm and normal heart sounds. Exam reveals no gallop and no friction rub.   No murmur heard.  Pulmonary/Chest: Effort normal and breath sounds normal. No stridor. No respiratory distress. She has no wheezes. She has no rales.   Abdominal: Soft. Bowel sounds are normal. She exhibits no distension and no mass. There is no tenderness. There is no guarding.   Musculoskeletal: Normal range of motion. She exhibits no edema.   diminished ROM  right hip - attributes to pain in right pelvis    Lymphadenopathy:     She has no cervical adenopathy.   Neurological: She is oriented to person, place, and time. No cranial nerve deficit.   Skin: Skin is warm and dry.   right hip and gluteal bruise   Psychiatric: She has a normal mood and affect.   Nursing note and vitals reviewed.       Medications:  Medication list was reviewed and changes noted under Assessment/Plan.      Current Facility-Administered Medications:     acetaminophen tablet 650 mg, 650 mg, Oral, Q6H PRN, Larry Boateng MD    dextrose 10% (D10W) Bolus, 12.5 g, Intravenous, PRN, Larry Boateng MD    dextrose 10% (D10W) Bolus, 25 g, Intravenous, PRN, Larry Boateng MD    glucagon (human recombinant) injection 1 mg, 1 mg, Intramuscular, PRN, Larry Boateng MD    glucose chewable tablet 16 g, 16 g, Oral, PRN, Larry Boateng MD    glucose chewable tablet 24 g, 24 g, Oral, PRN, Larry Boateng MD    ondansetron injection 4 mg, 4 mg, Intravenous, Q8H PRN, Larry Boateng MD    sodium chloride 0.9% flush 10 mL, 10 mL, Intravenous, PRN,  Kenji Hill MD    sodium chloride 0.9% flush 10 mL, 10 mL, Intravenous, PRN, Larry Boateng MD    acetaminophen, Dextrose 10% Bolus, Dextrose 10% Bolus, glucagon (human recombinant), glucose, glucose, ondansetron, sodium chloride 0.9%, sodium chloride 0.9%    Laboratory/Diagnostic Data:  Reviewed and noted in plan where applicable- Please see chart for full lab data.    Recent Labs   Lab 12/21/19 1920 12/21/19 2247 12/26/19 1152 12/27/19  0318   WBC 4.50  --  8.92 9.27  9.27   HGB 13.3  --  10.4* 9.2*  9.2*   HCT 39.8 35* 31.0* 27.3*  27.3*     --  191 195  195       Recent Labs   Lab 12/21/19 1920 12/26/19 1152 12/27/19  0318   * 133* 131*  131*   K 2.7* 3.1* 3.5  3.5   CL 96 97 100  100   CO2 24 23 22*  22*   BUN 10 23 23  23   CREATININE 0.7 0.8 0.8  0.8   * 134* 149*  149*   CALCIUM 9.9 10.0 8.9  8.9   MG 1.9 2.2 1.9   PHOS 2.9  --  2.7       Recent Labs   Lab 12/21/19 1920 12/26/19 1152 12/27/19 0318   ALKPHOS 131 109 84  84   ALT 12 12 10  10   AST 21 24 21  21   ALBUMIN 3.8 3.7 3.0*  3.0*   PROT 7.6 7.0 6.1  6.1   BILITOT 0.4 0.7 0.7  0.7   INR  --  1.0  --         Microbiology labs for the last week  Microbiology Results (last 7 days)     ** No results found for the last 168 hours. **           Imaging Results           CT Pelvis Without Contrast (Final result)  Result time 12/26/19 16:53:50    Final result by Roger Pearl Jr., MD (12/26/19 16:53:50)             Impression:      Fractures involving the right superior and inferior pubic rami and right sacrum with associated right nayeli-pelvic hematoma as detailed above.    This report was flagged in Epic as abnormal.    COMMUNICATION  This critical result was discovered/received at 1557. The critical information above was relayed directly by Kael Camp MD by telephone to MD Sergio on 12/26/2019 at 1558.    Electronically signed by resident: Kael Camp,  MD  Date:    12/26/2019  Time:    15:30    Electronically signed by: Roger Pearl MD  Date:    12/26/2019  Time:    16:53           Narrative:    EXAMINATION:  CT PELVIS WITHOUT CONTRAST    CLINICAL HISTORY:  Pelvic fracture, known or suspected;    TECHNIQUE:  Low dose axial images, sagittal and coronal reformations were obtained from the iliac crests to the pubic symphysis without the administration of intravenous contrast.    COMPARISON:  Hip radiograph 12/26/2019    FINDINGS:  Visualized loops of small large bowel reveal no evidence of obstruction or inflammation.  There scattered colonic diverticula.  No pelvic ascites, intraperitoneal free air, or abdominopelvic lymphadenopathy identified.    The urinary bladder is grossly unremarkable. Operative change of hysterectomy.    Visualized abdominal aorta reveals moderate calcific atherosclerosis extending into the branch vessels.    There is a comminuted slightly displaced fracture involving the right superior pubic ramus.  There is a mildly displaced fracture involving the medial aspect of the right inferior pubic ramus (sagittal image 102 and coronal image 78.)  An additional nondisplaced fracture is noted in the right sacral ala near the sacroiliac joint (axial series 2, image 158 and coronal image 97) which extends vertically involving nearly the entire craniocaudal dimension of the right nayeli sacrum.  There is no disruption of the sacroiliac joints or pubic symphysis.  There is small volume pelvic free fluid layering along the right lateral aspect of the bladder, in the presacral soft tissues, and extending along the right iliacus muscle belly likely hematoma.  Moderate joint space narrowing of the iliofemoral joints bilaterally.  Advanced degenerative change of the distal lumbar spine.    The extra-pelvic soft tissues reveal mild subcutaneous edema, and inflammatory/contusion fat stranding overlying the right greater trochanter.                              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  No fracture dislocation bone destruction seen.      Electronically signed by: Javier Zamora MD  Date:    12/26/2019  Time:    13:18                           X-Ray Hip 2 View Right (Final result)  Result time 12/26/19 13:18:18    Final result by Javier Zamora III, MD (12/26/19 13:18:18)             Narrative:    EXAMINATION:  XR HIP 2 VIEW RIGHT    CLINICAL HISTORY:  fall;    FINDINGS:  Two views right: There are possible fractures of the right superior and inferior pubic ramus.  There is baseline DJD.  No femur fracture seen.  CT could be helpful.  There could be an acetabular fracture as well.      Electronically signed by: Javier Zamora MD  Date:    12/26/2019  Time:    13:18                           X-Ray Chest AP Portable (Final result)  Result time 12/26/19 13:07:03    Final result by Perla Rutledge MD (12/26/19 13:07:03)             Impression:      No source for chest pain established on this study.      Electronically signed by: Perla Rutledge MD  Date:    12/26/2019  Time:    13:07           Narrative:    EXAMINATION:  XR CHEST AP PORTABLE    CLINICAL HISTORY:  Chest Pain;    TECHNIQUE:  Single frontal view of the chest was performed.    COMPARISON:  12/16/2015.    FINDINGS:  The patient has thoracic dextrocurvature.  Allowing for this, mediastinal structures are midline.  Descending thoracic aorta is tortuous and atherosclerotic.  There is also abundant calcific atherosclerosis in the proximal right common carotid artery.    Allowing for calcified costochondral cartilage I detect no acute intrathoracic disease.  The source of the patient's chest pain is not established on this  "study.    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    I detect no pneumothorax, pneumomediastinum or pneumoperitoneum.    Advanced degenerative changes are present at the shoulders in this 89-year-old woman.                              Estimated body mass index is 20.6 kg/m² as calculated from the following:    Height as of this encounter: 5' 2" (1.575 m).    Weight as of this encounter: 51.1 kg (112 lb 10.5 oz).    I & O (Last 24H):    Intake/Output Summary (Last 24 hours) at 12/27/2019 0632  Last data filed at 12/27/2019 0500  Gross per 24 hour   Intake 240 ml   Output 0 ml   Net 240 ml       Body mass index is 20.6 kg/m².    Estimated Creatinine Clearance: 35.5 mL/min (based on SCr of 0.8 mg/dL).    ASSESSMENT/PLAN:     Active Problems:    Active Diagnoses:     Diagnosis Date Noted POA    PRINCIPAL PROBLEM:  Closed fracture of ramus of right pubis [S32.591A]. mentions that fell 2 days ago on her way out of bathroom  She states that she tripped and fell, striking her right forearm on a door and her right hip on a wood floor. denies palpitations, lightheadedness prior to fall. denies head trauma or loss of consciousness.  X ray right hip - possible fractures of the right superior and inferior pubic ramus   CT pelvis without contrast - Fractures involving the right superior and inferior pubic rami and right sacrum with associated right nayeli-pelvic hematoma as detailed above. orthopedic surgery consulted   12/27/2019  Status post orthopedic surgery evaluation - WBAT to RLE. No orthopaedic surgical intervention at this time. The fractures are very likely amenable to non-operative treatment, particularly considering the patients advanced age. \ patient will likely benefit from placement in IP Rehab vs. SNF to work with PT, however at this time, the patient is adamantly refusing any such arrangement and wishes to go home following hospital workup.    12/26/2019 Unknown    New onset " atrial fibrillation ?[I48.91]EKG - Sinus tachycardia with frequent Premature atrial complexes  while in the ER , upon placing the patient on a cardiac monitor, it is noted with tachardia -  atrial fibrillation with rapid ventricular response? at 140s improved to 100s after IV Metoprolol 5mg x 1.  she states that she has been told in the past that she has some irregular heartbeat.  However, she is not currently being treated for it, prior cardiologist offered her nuclear stress test but patient refused it.  continue Metoprolol as needed. discussed with cardiology. no evidence of afib per EKG in the hospital   12/26/2019 Yes    Fall [W19.XXXA]fall precautions 12/26/2019 Yes    Hyponatremia [E87.1]sodium at 133. monitor  12/27/2019 sodium at 131 12/26/2019 Unknown    Anemia [D64.9]Hb 13.3 to 10.4 overlast 3 days.blood loss from skin tear RUE vs bruise secondary to fall.monitor.hold ASA for now   01/27/2019 hemoglobin dropped to 9.2- likely from right hemipelvis hematoma monitor for stabilization 12/26/2019 Unknown    Hypertension [I10]blood pressure controlled off medications 09/25/2013 Yes    Sinus arrhythmia [I49.8]as above  09/25/2013 Yes    Diastolic dysfunction [I51.89]does not seem to be decompensated. I/O  Monitor   Mild elevated troponin 0.127-0.086 denies chest complains.  EKG without ischemic changes 09/25/2013 Yes    Hypokalemia [E87.6]3.5 replaced 09/25/2013 Yes       Problems Resolved During this Admission:         Disposition- PT/OT evaluation pending. patient agrreable for SNF    DVT prophylaxis addressed with:  Bilateral SCDs            Larry Boateng MD  Attending Staff Physician  Hospital Medicine  pager- [phone] [facility] - [phone]

## 2019-12-28 LAB
ALBUMIN SERPL BCP-MCNC: 2.8 G/DL (ref 3.5–5.2)
ALBUMIN SERPL BCP-MCNC: 2.8 G/DL (ref 3.5–5.2)
ALP SERPL-CCNC: 83 U/L (ref 55–135)
ALP SERPL-CCNC: 83 U/L (ref 55–135)
ALT SERPL W/O P-5'-P-CCNC: 10 U/L (ref 10–44)
ALT SERPL W/O P-5'-P-CCNC: 10 U/L (ref 10–44)
ANION GAP SERPL CALC-SCNC: 8 MMOL/L (ref 8–16)
ANION GAP SERPL CALC-SCNC: 8 MMOL/L (ref 8–16)
AST SERPL-CCNC: 23 U/L (ref 10–40)
AST SERPL-CCNC: 23 U/L (ref 10–40)
BASOPHILS # BLD AUTO: 0.03 K/UL (ref 0–0.2)
BASOPHILS NFR BLD: 0.3 % (ref 0–1.9)
BASOPHILS NFR BLD: 0.4 % (ref 0–1.9)
BASOPHILS NFR BLD: 0.4 % (ref 0–1.9)
BILIRUB SERPL-MCNC: 1 MG/DL (ref 0.1–1)
BILIRUB SERPL-MCNC: 1 MG/DL (ref 0.1–1)
BUN SERPL-MCNC: 21 MG/DL (ref 10–30)
BUN SERPL-MCNC: 21 MG/DL (ref 10–30)
CALCIUM SERPL-MCNC: 9.2 MG/DL (ref 8.7–10.5)
CALCIUM SERPL-MCNC: 9.2 MG/DL (ref 8.7–10.5)
CHLORIDE SERPL-SCNC: 97 MMOL/L (ref 95–110)
CHLORIDE SERPL-SCNC: 97 MMOL/L (ref 95–110)
CO2 SERPL-SCNC: 25 MMOL/L (ref 23–29)
CO2 SERPL-SCNC: 25 MMOL/L (ref 23–29)
CREAT SERPL-MCNC: 0.7 MG/DL (ref 0.5–1.4)
CREAT SERPL-MCNC: 0.7 MG/DL (ref 0.5–1.4)
DIFFERENTIAL METHOD: ABNORMAL
EOSINOPHIL # BLD AUTO: 0.1 K/UL (ref 0–0.5)
EOSINOPHIL # BLD AUTO: 0.1 K/UL (ref 0–0.5)
EOSINOPHIL # BLD AUTO: 0.2 K/UL (ref 0–0.5)
EOSINOPHIL NFR BLD: 1 % (ref 0–8)
EOSINOPHIL NFR BLD: 1.5 % (ref 0–8)
EOSINOPHIL NFR BLD: 2.1 % (ref 0–8)
ERYTHROCYTE [DISTWIDTH] IN BLOOD BY AUTOMATED COUNT: 12.8 % (ref 11.5–14.5)
EST. GFR  (AFRICAN AMERICAN): >60 ML/MIN/1.73 M^2
EST. GFR  (AFRICAN AMERICAN): >60 ML/MIN/1.73 M^2
EST. GFR  (NON AFRICAN AMERICAN): >60 ML/MIN/1.73 M^2
EST. GFR  (NON AFRICAN AMERICAN): >60 ML/MIN/1.73 M^2
GLUCOSE SERPL-MCNC: 126 MG/DL (ref 70–110)
GLUCOSE SERPL-MCNC: 126 MG/DL (ref 70–110)
HCT VFR BLD AUTO: 25.4 % (ref 37–48.5)
HCT VFR BLD AUTO: 26 % (ref 37–48.5)
HCT VFR BLD AUTO: 26.3 % (ref 37–48.5)
HGB BLD-MCNC: 8.2 G/DL (ref 12–16)
HGB BLD-MCNC: 8.6 G/DL (ref 12–16)
HGB BLD-MCNC: 8.8 G/DL (ref 12–16)
IMM GRANULOCYTES # BLD AUTO: 0.03 K/UL (ref 0–0.04)
IMM GRANULOCYTES # BLD AUTO: 0.05 K/UL (ref 0–0.04)
IMM GRANULOCYTES # BLD AUTO: 0.05 K/UL (ref 0–0.04)
IMM GRANULOCYTES NFR BLD AUTO: 0.4 % (ref 0–0.5)
IMM GRANULOCYTES NFR BLD AUTO: 0.5 % (ref 0–0.5)
IMM GRANULOCYTES NFR BLD AUTO: 0.6 % (ref 0–0.5)
LYMPHOCYTES # BLD AUTO: 1.2 K/UL (ref 1–4.8)
LYMPHOCYTES # BLD AUTO: 1.6 K/UL (ref 1–4.8)
LYMPHOCYTES # BLD AUTO: 2.3 K/UL (ref 1–4.8)
LYMPHOCYTES NFR BLD: 15.1 % (ref 18–48)
LYMPHOCYTES NFR BLD: 20.1 % (ref 18–48)
LYMPHOCYTES NFR BLD: 22.7 % (ref 18–48)
MAGNESIUM SERPL-MCNC: 2 MG/DL (ref 1.6–2.6)
MCH RBC QN AUTO: 32.4 PG (ref 27–31)
MCH RBC QN AUTO: 32.7 PG (ref 27–31)
MCH RBC QN AUTO: 33.7 PG (ref 27–31)
MCHC RBC AUTO-ENTMCNC: 32.3 G/DL (ref 32–36)
MCHC RBC AUTO-ENTMCNC: 33.1 G/DL (ref 32–36)
MCHC RBC AUTO-ENTMCNC: 33.5 G/DL (ref 32–36)
MCV RBC AUTO: 100 FL (ref 82–98)
MCV RBC AUTO: 101 FL (ref 82–98)
MCV RBC AUTO: 99 FL (ref 82–98)
MONOCYTES # BLD AUTO: 0.8 K/UL (ref 0.3–1)
MONOCYTES # BLD AUTO: 0.9 K/UL (ref 0.3–1)
MONOCYTES # BLD AUTO: 1.2 K/UL (ref 0.3–1)
MONOCYTES NFR BLD: 11.3 % (ref 4–15)
MONOCYTES NFR BLD: 11.6 % (ref 4–15)
MONOCYTES NFR BLD: 9.9 % (ref 4–15)
NEUTROPHILS # BLD AUTO: 5.3 K/UL (ref 1.8–7.7)
NEUTROPHILS # BLD AUTO: 5.9 K/UL (ref 1.8–7.7)
NEUTROPHILS # BLD AUTO: 6.4 K/UL (ref 1.8–7.7)
NEUTROPHILS NFR BLD: 63.9 % (ref 38–73)
NEUTROPHILS NFR BLD: 65.5 % (ref 38–73)
NEUTROPHILS NFR BLD: 72.7 % (ref 38–73)
NRBC BLD-RTO: 0 /100 WBC
PHOSPHATE SERPL-MCNC: 3.2 MG/DL (ref 2.7–4.5)
PLATELET # BLD AUTO: 190 K/UL (ref 150–350)
PLATELET # BLD AUTO: 197 K/UL (ref 150–350)
PLATELET # BLD AUTO: 219 K/UL (ref 150–350)
PMV BLD AUTO: 10 FL (ref 9.2–12.9)
PMV BLD AUTO: 10.3 FL (ref 9.2–12.9)
PMV BLD AUTO: 9.9 FL (ref 9.2–12.9)
POTASSIUM SERPL-SCNC: 3.3 MMOL/L (ref 3.5–5.1)
POTASSIUM SERPL-SCNC: 3.3 MMOL/L (ref 3.5–5.1)
PROT SERPL-MCNC: 6.2 G/DL (ref 6–8.4)
PROT SERPL-MCNC: 6.2 G/DL (ref 6–8.4)
RBC # BLD AUTO: 2.53 M/UL (ref 4–5.4)
RBC # BLD AUTO: 2.61 M/UL (ref 4–5.4)
RBC # BLD AUTO: 2.63 M/UL (ref 4–5.4)
SODIUM SERPL-SCNC: 130 MMOL/L (ref 136–145)
SODIUM SERPL-SCNC: 130 MMOL/L (ref 136–145)
WBC # BLD AUTO: 8.05 K/UL (ref 3.9–12.7)
WBC # BLD AUTO: 8.16 K/UL (ref 3.9–12.7)
WBC # BLD AUTO: 9.99 K/UL (ref 3.9–12.7)

## 2019-12-28 PROCEDURE — 25000003 PHARM REV CODE 250: Performed by: PHYSICIAN ASSISTANT

## 2019-12-28 PROCEDURE — 99223 1ST HOSP IP/OBS HIGH 75: CPT | Mod: ,,, | Performed by: ORTHOPAEDIC SURGERY

## 2019-12-28 PROCEDURE — 99232 SBSQ HOSP IP/OBS MODERATE 35: CPT | Mod: ,,, | Performed by: HOSPITALIST

## 2019-12-28 PROCEDURE — 63600175 PHARM REV CODE 636 W HCPCS: Performed by: HOSPITALIST

## 2019-12-28 PROCEDURE — 85025 COMPLETE CBC W/AUTO DIFF WBC: CPT | Mod: 91

## 2019-12-28 PROCEDURE — 99232 PR SUBSEQUENT HOSPITAL CARE,LEVL II: ICD-10-PCS | Mod: ,,, | Performed by: HOSPITALIST

## 2019-12-28 PROCEDURE — 36415 COLL VENOUS BLD VENIPUNCTURE: CPT

## 2019-12-28 PROCEDURE — 25000003 PHARM REV CODE 250: Performed by: HOSPITALIST

## 2019-12-28 PROCEDURE — 84100 ASSAY OF PHOSPHORUS: CPT

## 2019-12-28 PROCEDURE — 20600001 HC STEP DOWN PRIVATE ROOM

## 2019-12-28 PROCEDURE — 99223 PR INITIAL HOSPITAL CARE,LEVL III: ICD-10-PCS | Mod: ,,, | Performed by: ORTHOPAEDIC SURGERY

## 2019-12-28 PROCEDURE — 83735 ASSAY OF MAGNESIUM: CPT

## 2019-12-28 PROCEDURE — 80053 COMPREHEN METABOLIC PANEL: CPT

## 2019-12-28 RX ORDER — OXYCODONE HCL 5 MG/5 ML
5 SOLUTION, ORAL ORAL EVERY 12 HOURS PRN
Status: DISCONTINUED | OUTPATIENT
Start: 2019-12-28 | End: 2020-01-02 | Stop reason: HOSPADM

## 2019-12-28 RX ORDER — AMOXICILLIN 250 MG
2 CAPSULE ORAL DAILY
Status: DISCONTINUED | OUTPATIENT
Start: 2019-12-28 | End: 2020-01-02 | Stop reason: HOSPADM

## 2019-12-28 RX ORDER — POTASSIUM CHLORIDE 20 MEQ/1
40 TABLET, EXTENDED RELEASE ORAL ONCE
Status: COMPLETED | OUTPATIENT
Start: 2019-12-28 | End: 2019-12-28

## 2019-12-28 RX ADMIN — SENNOSIDES AND DOCUSATE SODIUM 2 TABLET: 8.6; 5 TABLET ORAL at 11:12

## 2019-12-28 RX ADMIN — ACETAMINOPHEN 1000 MG: 500 TABLET ORAL at 04:12

## 2019-12-28 RX ADMIN — POLYETHYLENE GLYCOL 3350 17 G: 17 POWDER, FOR SOLUTION ORAL at 09:12

## 2019-12-28 RX ADMIN — ACETAMINOPHEN 1000 MG: 500 TABLET ORAL at 09:12

## 2019-12-28 RX ADMIN — POTASSIUM CHLORIDE 40 MEQ: 1500 TABLET, EXTENDED RELEASE ORAL at 09:12

## 2019-12-28 RX ADMIN — OXYCODONE HYDROCHLORIDE 5 MG: 5 SOLUTION ORAL at 11:12

## 2019-12-28 RX ADMIN — PANTOPRAZOLE SODIUM 40 MG: 40 TABLET, DELAYED RELEASE ORAL at 09:12

## 2019-12-28 RX ADMIN — ACETAMINOPHEN 1000 MG: 500 TABLET ORAL at 08:12

## 2019-12-28 NOTE — PROGRESS NOTES
Progress Note  Hospital Medicine    Admit Date: 12/26/2019  Length of Stay:  LOS: 2 days     SUBJECTIVE:         Follow-up For:  Closed fracture of ramus of right pubis    HPI/Interval history (See H&P for complete P,F,SHx) :     Elisha Young is a 89 y.o. female with a PMH of HTN, HLD, Diastolic dysfunction , sinus arrhythmia  who presented to the ED on 12/26/2019 diagnosed with  Hip Pain (Right hip pain s/p falll on Tuesday night. Pt having difficulty with ambulation. Abrasion noted to right upper arm. )  AAOX 3, accompanied by son and grand daughter . mentions that fell 2 days ago on her way out of bathroom  She states that she tripped and fell, striking her right forearm on a door and her right hip on a wood floor. denies palpitations, lightheadedness prior to fall. denies head trauma or loss of consciousness.  Since then, able to get herself her up to the chair and informed her son. she has been able to ambulate with discomfort, refused to come to ER due to madelin.  She reports an associated bruise to the right hip area.  Upon striking the door with her right arm, she sustained a skin tear which has been actively bleeding since the fall.  Her family has been bandaging this with some relief of her bleeding.  while in the ER , upon placing the patient on a cardiac monitor, it is noted with tachardia -  atrial fibrillation with rapid ventricular response? at 140s improved to 100s after IV Metoprolol 5mg x 1.  she states that she has been told in the past that she has some irregular heartbeat.  However, she is not currently being treated for it, prior cardiologist offered her nuclear stress test but patient refused it.  She denies any numbness, weakness, dizziness, chest pain, shortness of breath, or palpitations.  She denies any other complaints.     l[facility] in [facility]. ADL/IADL independent at baseline      12/27/2019  Status post orthopedic surgery evaluation - WBAT to RLE. No orthopaedic surgical  intervention at this time. The fractures are very likely amenable to non-operative treatment, particularly considering the patients advanced age. \ patient will likely benefit from placement in IP Rehab vs. SNF to work with PT, however at this time, the patient is adamantly refusing any such arrangement and wishes to go home following hospital workup.   12/28/19  Hb at 8.2. hypokalemia replaced. Stood with therapy yesterday. Attempted to ambulate but pain was quite significant. Will need post mobilization films after ambulating. Will fu in ortho clinic in 2 weeks.started on iron and oxycodone           Review of Systems:   Pain scale: Constitutional: Positive for activity change. Negative for appetite change, chills, fatigue, fever and unexpected weight change.   HENT: Negative for congestion, ear discharge, ear pain, facial swelling and sinus pain.    Eyes: Negative for pain, discharge, redness and itching.   Respiratory: Negative for cough, choking, chest tightness, shortness of breath and wheezing.    Cardiovascular: Negative for chest pain, palpitations and leg swelling.   Gastrointestinal: Negative for abdominal distention, abdominal pain, anal bleeding, blood in stool, constipation, diarrhea, nausea and vomiting.   Endocrine: Negative for cold intolerance.   Genitourinary: Positive for pelvic pain. Negative for dysuria, flank pain, frequency and hematuria.   Musculoskeletal: Positive for arthralgias (right pelvic pain with movement and ambulation) and gait problem (since fall -). Negative for back pain, joint swelling, neck pain and neck stiffness.   Neurological: Negative for dizziness, syncope, speech difficulty, weakness, light-headedness and headaches.   Hematological: Negative for adenopathy.   Psychiatric/Behavioral: Negative for agitation, confusion, dysphoric mood and suicidal ideas.     OBJECTIVE:     Vital Signs Range (Last 24H):  Temp:  [98.1 °F (36.7 °C)-99.6 °F (37.6 °C)]   Pulse:  []    Resp:  [16-18]   BP: (112-133)/(55-62)   SpO2:  [87 %-98 %]     Physical Exam:  Constitutional: She is oriented to person, place, and time. She appears well-developed and well-nourished.   thin built    HENT:   Head: Normocephalic and atraumatic.   Mouth/Throat: Oropharynx is clear and moist. No oropharyngeal exudate.   Eyes: Pupils are equal, round, and reactive to light. Conjunctivae and EOM are normal. Right eye exhibits no discharge. Left eye exhibits no discharge.   diminished vision left eye   Neck: Normal range of motion. Neck supple. No JVD present. No tracheal deviation present. No thyromegaly present.   Cardiovascular: Normal rate, regular rhythm and normal heart sounds. Exam reveals no gallop and no friction rub.   No murmur heard.  Pulmonary/Chest: Effort normal and breath sounds normal. No stridor. No respiratory distress. She has no wheezes. She has no rales.   Abdominal: Soft. Bowel sounds are normal. She exhibits no distension and no mass. There is no tenderness. There is no guarding.   Musculoskeletal: Normal range of motion. She exhibits no edema.   diminished ROM  right hip - attributes to pain in right pelvis    Lymphadenopathy:     She has no cervical adenopathy.   Neurological: She is oriented to person, place, and time. No cranial nerve deficit.   Skin: Skin is warm and dry.   right hip and gluteal bruise   Psychiatric: She has a normal mood and affect.   Nursing note and vitals reviewed.       Medications:  Medication list was reviewed and changes noted under Assessment/Plan.      Current Facility-Administered Medications:     acetaminophen tablet 1,000 mg, 1,000 mg, Oral, TID, Porter Starr PA-C, 1,000 mg at 12/27/19 1957    acetaminophen tablet 650 mg, 650 mg, Oral, Q6H PRN, Larry Boateng MD, 650 mg at 12/27/19 1331    albuterol-ipratropium 2.5 mg-0.5 mg/3 mL nebulizer solution 3 mL, 3 mL, Nebulization, Q4H PRN, Porter Starr PA-C    bisacodyl suppository 10 mg, 10 mg, Rectal,  Daily PRN, Porter Starr PA-C    dextrose 10% (D10W) Bolus, 12.5 g, Intravenous, PRN, Larry Boateng MD    dextrose 10% (D10W) Bolus, 25 g, Intravenous, PRN, Larry Boateng MD    glucagon (human recombinant) injection 1 mg, 1 mg, Intramuscular, PRN, Larry Boateng MD    glucose chewable tablet 16 g, 16 g, Oral, PRN, Larry Boateng MD    glucose chewable tablet 24 g, 24 g, Oral, PRN, Larry Boateng MD    ibuprofen tablet 400 mg, 400 mg, Oral, Q6H PRN, Porter Starr PA-C, 400 mg at 12/27/19 2110    melatonin tablet 6 mg, 6 mg, Oral, Nightly PRN, Porter Starr PA-C    ondansetron injection 4 mg, 4 mg, Intravenous, Q8H PRN, Larry Boateng MD    pantoprazole EC tablet 40 mg, 40 mg, Oral, Daily, Porter Starr PA-C    polyethylene glycol packet 17 g, 17 g, Oral, Daily, Porter Starr PA-C    promethazine (PHENERGAN) 6.25 mg in dextrose 5 % 50 mL IVPB, 6.25 mg, Intravenous, Q6H PRN, Porter Starr PA-C    sodium chloride 0.9% flush 10 mL, 10 mL, Intravenous, PRN, Kenji Hill MD    sodium chloride 0.9% flush 10 mL, 10 mL, Intravenous, PRN, Larry Boateng MD    acetaminophen, albuterol-ipratropium, bisacodyl, Dextrose 10% Bolus, Dextrose 10% Bolus, glucagon (human recombinant), glucose, glucose, ibuprofen, melatonin, ondansetron, promethazine (PHENERGAN) IVPB, sodium chloride 0.9%, sodium chloride 0.9%    Laboratory/Diagnostic Data:  Reviewed and noted in plan where applicable- Please see chart for full lab data.    Recent Labs   Lab 12/27/19  0318 12/27/19  1555 12/28/19  0006   WBC 9.27  9.27 10.08 9.99   HGB 9.2*  9.2* 8.4* 8.2*   HCT 27.3*  27.3* 25.4* 25.4*     195 178 190       Recent Labs   Lab 12/21/19  1920 12/26/19  1152 12/27/19 0318 12/28/19  0510   * 133* 131*  131* 130*  130*   K 2.7* 3.1* 3.5  3.5 3.3*  3.3*   CL 96 97 100  100 97  97   CO2 24 23 22*  22* 25  25   BUN 10 23 23  23 21  21   CREATININE 0.7 0.8 0.8  0.8 0.7  0.7   GLU  141* 134* 149*  149* 126*  126*   CALCIUM 9.9 10.0 8.9  8.9 9.2  9.2   MG 1.9 2.2 1.9 2.0   PHOS 2.9  --  2.7 3.2       Recent Labs   Lab 12/26/19  1152 12/27/19  0318 12/28/19  0510   ALKPHOS 109 84  84 83  83   ALT 12 10  10 10  10   AST 24 21  21 23  23   ALBUMIN 3.7 3.0*  3.0* 2.8*  2.8*   PROT 7.0 6.1  6.1 6.2  6.2   BILITOT 0.7 0.7  0.7 1.0  1.0   INR 1.0  --   --         Microbiology labs for the last week  Microbiology Results (last 7 days)     ** No results found for the last 168 hours. **           Imaging Results           CT Pelvis Without Contrast (Final result)  Result time 12/26/19 16:53:50    Final result by Roger Pearl Jr., MD (12/26/19 16:53:50)             Impression:      Fractures involving the right superior and inferior pubic rami and right sacrum with associated right nayeli-pelvic hematoma as detailed above.    This report was flagged in Epic as abnormal.    COMMUNICATION  This critical result was discovered/received at 1557. The critical information above was relayed directly by Kael Camp MD by telephone to MD Sergio on 12/26/2019 at 1558.    Electronically signed by resident: Kael Camp MD  Date:    12/26/2019  Time:    15:30    Electronically signed by: Roger Pearl MD  Date:    12/26/2019  Time:    16:53           Narrative:    EXAMINATION:  CT PELVIS WITHOUT CONTRAST    CLINICAL HISTORY:  Pelvic fracture, known or suspected;    TECHNIQUE:  Low dose axial images, sagittal and coronal reformations were obtained from the iliac crests to the pubic symphysis without the administration of intravenous contrast.    COMPARISON:  Hip radiograph 12/26/2019    FINDINGS:  Visualized loops of small large bowel reveal no evidence of obstruction or inflammation.  There scattered colonic diverticula.  No pelvic ascites, intraperitoneal free air, or abdominopelvic lymphadenopathy identified.    The urinary bladder is grossly unremarkable. Operative change of  hysterectomy.    Visualized abdominal aorta reveals moderate calcific atherosclerosis extending into the branch vessels.    There is a comminuted slightly displaced fracture involving the right superior pubic ramus.  There is a mildly displaced fracture involving the medial aspect of the right inferior pubic ramus (sagittal image 102 and coronal image 78.)  An additional nondisplaced fracture is noted in the right sacral ala near the sacroiliac joint (axial series 2, image 158 and coronal image 97) which extends vertically involving nearly the entire craniocaudal dimension of the right nayeli sacrum.  There is no disruption of the sacroiliac joints or pubic symphysis.  There is small volume pelvic free fluid layering along the right lateral aspect of the bladder, in the presacral soft tissues, and extending along the right iliacus muscle belly likely hematoma.  Moderate joint space narrowing of the iliofemoral joints bilaterally.  Advanced degenerative change of the distal lumbar spine.    The extra-pelvic soft tissues reveal mild subcutaneous edema, and inflammatory/contusion fat stranding overlying the right greater trochanter.                             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  No fracture dislocation bone destruction seen.      Electronically signed by: Javier Zamora MD  Date:    12/26/2019  Time:    13:18                           X-Ray Hip 2 View Right (Final result)  Result time 12/26/19 13:18:18    Final result by Javier Zamora III, MD (12/26/19 13:18:18)             Narrative:    EXAMINATION:  XR HIP 2 VIEW RIGHT    CLINICAL HISTORY:  fall;    FINDINGS:  Two views right: There are possible fractures of the right  "superior and inferior pubic ramus.  There is baseline DJD.  No femur fracture seen.  CT could be helpful.  There could be an acetabular fracture as well.      Electronically signed by: Javier Zamora MD  Date:    12/26/2019  Time:    13:18                           X-Ray Chest AP Portable (Final result)  Result time 12/26/19 13:07:03    Final result by Perla Rutledge MD (12/26/19 13:07:03)             Impression:      No source for chest pain established on this study.      Electronically signed by: Perla Rutledge MD  Date:    12/26/2019  Time:    13:07           Narrative:    EXAMINATION:  XR CHEST AP PORTABLE    CLINICAL HISTORY:  Chest Pain;    TECHNIQUE:  Single frontal view of the chest was performed.    COMPARISON:  12/16/2015.    FINDINGS:  The patient has thoracic dextrocurvature.  Allowing for this, mediastinal structures are midline.  Descending thoracic aorta is tortuous and atherosclerotic.  There is also abundant calcific atherosclerosis in the proximal right common carotid artery.    Allowing for calcified costochondral cartilage I detect no acute intrathoracic disease.  The source of the patient's chest pain is not established on this study.    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    I detect no pneumothorax, pneumomediastinum or pneumoperitoneum.    Advanced degenerative changes are present at the shoulders in this 89-year-old woman.                              Estimated body mass index is 21.81 kg/m² as calculated from the following:    Height as of this encounter: 5' 2" (1.575 m).    Weight as of this encounter: 54.1 kg (119 lb 4.3 oz).    I & O (Last 24H):    Intake/Output Summary (Last 24 hours) at 12/28/2019 0656  Last data filed at 12/28/2019 0501  Gross per 24 hour   Intake 780 ml   Output 1120 ml   Net -340 ml       Body mass index is 21.81 kg/m².    Estimated Creatinine Clearance: 40.6 mL/min (based on SCr of 0.7 mg/dL).    ASSESSMENT/PLAN: "     Active Problems:    Active Diagnoses:     Diagnosis Date Noted POA    PRINCIPAL PROBLEM:  Closed fracture of ramus of right pubis [S32.591A]. mentions that fell 2 days ago on her way out of bathroom  She states that she tripped and fell, striking her right forearm on a door and her right hip on a wood floor. denies palpitations, lightheadedness prior to fall. denies head trauma or loss of consciousness.  X ray right hip - possible fractures of the right superior and inferior pubic ramus   CT pelvis without contrast - Fractures involving the right superior and inferior pubic rami and right sacrum with associated right nayeli-pelvic hematoma as detailed above. orthopedic surgery consulted   12/27/2019  Status post orthopedic surgery evaluation - WBAT to RLE. No orthopaedic surgical intervention at this time. The fractures are very likely amenable to non-operative treatment, particularly considering the patients advanced age. \ patient will likely benefit from placement in IP Rehab vs. SNF to work with PT, however at this time, the patient is adamantly refusing any such arrangement and wishes to go home following hospital workup.   12/28/19  Hb at 8.2. hypokalemia replaced. Stood with therapy yesterday. Attempted to ambulate but pain was quite significant. Will need post mobilization films after ambulating. Will fu in ortho clinic in 2 weeks. . Will fu in ortho clinic in 2 weeks.started on iron and oxycodone    12/26/2019 Unknown    New onset atrial fibrillation ?[I48.91]EKG - Sinus tachycardia with frequent Premature atrial complexes  while in the ER , upon placing the patient on a cardiac monitor, it is noted with tachardia -  atrial fibrillation with rapid ventricular response? at 140s improved to 100s after IV Metoprolol 5mg x 1.  she states that she has been told in the past that she has some irregular heartbeat.  However, she is not currently being treated for it, prior cardiologist offered her nuclear stress  test but patient refused it.  continue Metoprolol as needed. discussed with cardiology. no evidence of afib per EKG in the hospital   12/26/2019 Yes    Fall [W19.XXXA]fall precautions 12/26/2019 Yes    Hyponatremia [E87.1]sodium at 133. monitor  12/27/2019 sodium at 131 12/26/2019 Unknown    Anemia [D64.9]Hb 13.3 to 10.4 overlast 3 days.blood loss from skin tear RUE vs bruise secondary to fall.monitor.hold ASA for now   01/27/2019 hemoglobin dropped to 9.2- likely from right hemipelvis hematoma monitor for stabilization 12/26/2019 Unknown    Hypertension [I10]blood pressure controlled off medications 09/25/2013 Yes    Sinus arrhythmia [I49.8]as above  09/25/2013 Yes    Diastolic dysfunction [I51.89]does not seem to be decompensated. I/O  Monitor   Mild elevated troponin 0.127-0.086 denies chest complains.  EKG without ischemic changes 09/25/2013 Yes    Hypokalemia [E87.6]3.5 replaced 09/25/2013 Yes       Problems Resolved During this Admission:         Disposition- . patient agrreable for SNF    DVT prophylaxis addressed with:  Bilateral SCDs            Larry Boateng MD  Attending Staff Physician  [facility] Medicine  pager- [phone] [employer] - [phone]

## 2019-12-28 NOTE — PLAN OF CARE
Patient remains free of falls or injury. Patient complains of pain with movement/coughing; treated with PO tylenol and ibuprofen. Ortho c/s completed for hip pain; no surgical intervention at this time.  provided list of SNFs to patient and family. Patient working with PT/OT. Plan for d/c to SNF when placement obtained. Plan of care reviewed with patient and son.

## 2019-12-28 NOTE — PLAN OF CARE
Plan of care discussed with patient and family. No questions regarding care plan. Was concerned about being able to work with PT with right hip pain. Oxycodone 5mg ordered and administered. VS stable. Pulse remains A-fib. Patient bedfast. Able to shift weight as needed and with encouragement. Patient to be repositioned Q2h d/t possible DTI with no weight on right hip. Oxycodone was effective. 18G to LAC intact and flushing without complications. Able to swallow meds whole. Appetite adequate. Will continue to monitor.

## 2019-12-28 NOTE — ASSESSMENT & PLAN NOTE
Elisha Young is a 89 y.o. female with minimally displaced right superior and inferior pubic ramus fractures, closed, NVI.    -Waiting on SNF placement  -WBAT to RLE  -PT/OT. Continued treatment   -To SNF when accepted    Dispo: at this time, continue PT/OT. She hasn't ambulated yet, only stood. Will need post mobilization films after ambulating. Will fu in ortho clinic in 2 weeks

## 2019-12-28 NOTE — PROGRESS NOTES
"[facility]  Orthopedics  Progress Note    Patient Name: Elisha Young  MRN: [id number]  Admission Date: 12/26/2019  Hospital Length of Stay: 2 days  Attending Provider: Larry Boateng MD  Primary Care Provider: Marky García MD    Subjective:     Principal Problem:Closed fracture of ramus of right pubis    Principal Orthopedic Problem: same    Interval History: Patient seen and examined at bedside.  No acute events overnight.  Pain controlled.  Stood with therapy yesterday. Attempted to ambulate but pain was quite significant. Patient emphasizes that she had been ambulating immediately after the fall with significant pain as well.     Review of patient's allergies indicates:  No Known Allergies    Current Facility-Administered Medications   Medication    acetaminophen tablet 1,000 mg    acetaminophen tablet 650 mg    albuterol-ipratropium 2.5 mg-0.5 mg/3 mL nebulizer solution 3 mL    bisacodyl suppository 10 mg    dextrose 10% (D10W) Bolus    dextrose 10% (D10W) Bolus    glucagon (human recombinant) injection 1 mg    glucose chewable tablet 16 g    glucose chewable tablet 24 g    ibuprofen tablet 400 mg    melatonin tablet 6 mg    ondansetron injection 4 mg    pantoprazole EC tablet 40 mg    polyethylene glycol packet 17 g    promethazine (PHENERGAN) 6.25 mg in dextrose 5 % 50 mL IVPB    sodium chloride 0.9% flush 10 mL    sodium chloride 0.9% flush 10 mL     Objective:     Vital Signs (Most Recent):  Temp: 98.1 °F (36.7 °C) (12/28/19 0406)  Pulse: 94 (12/28/19 0501)  Resp: 18 (12/28/19 0406)  BP: 118/62 (12/28/19 0406)  SpO2: 97 % (12/28/19 0406) Vital Signs (24h Range):  Temp:  [98.1 °F (36.7 °C)-99.6 °F (37.6 °C)] 98.1 °F (36.7 °C)  Pulse:  [] 94  Resp:  [16-18] 18  SpO2:  [87 %-98 %] 97 %  BP: (112-133)/(55-62) 118/62     Weight: 54.1 kg (119 lb 4.3 oz)  Height: 5' 2" (157.5 cm)  Body mass index is 21.81 kg/m².      Intake/Output Summary (Last 24 hours) at 12/28/2019 " 0639  Last data filed at 12/28/2019 0501  Gross per 24 hour   Intake 780 ml   Output 1120 ml   Net -340 ml       Ortho/SPM Exam  AAOx4  NAD  Reg rate  No increased WOB  SILT T/SP/DP/Ahumada/Sa  Motor intact T/SP/DP  WWP extremities  FCDs in place and functioning    Significant Labs:   CBC:   Recent Labs   Lab 12/27/19  0318 12/27/19  1555 12/28/19  0006   WBC 9.27  9.27 10.08 9.99   HGB 9.2*  9.2* 8.4* 8.2*   HCT 27.3*  27.3* 25.4* 25.4*     195 178 190     CMP:   Recent Labs   Lab 12/26/19  1152 12/27/19  0318   * 131*  131*   K 3.1* 3.5  3.5   CL 97 100  100   CO2 23 22*  22*   * 149*  149*   BUN 23 23  23   CREATININE 0.8 0.8  0.8   CALCIUM 10.0 8.9  8.9   PROT 7.0 6.1  6.1   ALBUMIN 3.7 3.0*  3.0*   BILITOT 0.7 0.7  0.7   ALKPHOS 109 84  84   AST 24 21  21   ALT 12 10  10   ANIONGAP 13 9  9   EGFRNONAA >60.0 >60.0  >60.0     Coagulation:   Recent Labs   Lab 12/26/19  1152   LABPROT 10.7   INR 1.0     All pertinent labs within the past 24 hours have been reviewed.    Significant Imaging: None    Assessment/Plan:     * Closed fracture of ramus of right pubis  Elisha Young is a 89 y.o. female with minimally displaced right superior and inferior pubic ramus fractures, closed, NVI.    -Waiting on SNF placement  -WBAT to RLE  -PT/OT. Continued treatment   -To SNF when accepted    Dispo: at this time, continue PT/OT. She hasn't ambulated yet, only stood. Will need post mobilization films after ambulating. Will fu in ortho clinic in 2 weeks            Kenji Haji MD  Orthopedics  [facility]

## 2019-12-28 NOTE — SUBJECTIVE & OBJECTIVE
"Principal Problem:Closed fracture of ramus of right pubis    Principal Orthopedic Problem: same    Interval History: Patient seen and examined at bedside.  No acute events overnight.  Pain controlled.  Stood with therapy yesterday. Attempted to ambulate but pain was quite significant. Patient emphasizes that she had been ambulating immediately after the fall with significant pain as well.     Review of patient's allergies indicates:  No Known Allergies    Current Facility-Administered Medications   Medication    acetaminophen tablet 1,000 mg    acetaminophen tablet 650 mg    albuterol-ipratropium 2.5 mg-0.5 mg/3 mL nebulizer solution 3 mL    bisacodyl suppository 10 mg    dextrose 10% (D10W) Bolus    dextrose 10% (D10W) Bolus    glucagon (human recombinant) injection 1 mg    glucose chewable tablet 16 g    glucose chewable tablet 24 g    ibuprofen tablet 400 mg    melatonin tablet 6 mg    ondansetron injection 4 mg    pantoprazole EC tablet 40 mg    polyethylene glycol packet 17 g    promethazine (PHENERGAN) 6.25 mg in dextrose 5 % 50 mL IVPB    sodium chloride 0.9% flush 10 mL    sodium chloride 0.9% flush 10 mL     Objective:     Vital Signs (Most Recent):  Temp: 98.1 °F (36.7 °C) (12/28/19 0406)  Pulse: 94 (12/28/19 0501)  Resp: 18 (12/28/19 0406)  BP: 118/62 (12/28/19 0406)  SpO2: 97 % (12/28/19 0406) Vital Signs (24h Range):  Temp:  [98.1 °F (36.7 °C)-99.6 °F (37.6 °C)] 98.1 °F (36.7 °C)  Pulse:  [] 94  Resp:  [16-18] 18  SpO2:  [87 %-98 %] 97 %  BP: (112-133)/(55-62) 118/62     Weight: 54.1 kg (119 lb 4.3 oz)  Height: 5' 2" (157.5 cm)  Body mass index is 21.81 kg/m².      Intake/Output Summary (Last 24 hours) at 12/28/2019 0639  Last data filed at 12/28/2019 0501  Gross per 24 hour   Intake 780 ml   Output 1120 ml   Net -340 ml       Ortho/SPM Exam  AAOx4  NAD  Reg rate  No increased WOB  SILT T/SP/DP/Ahumada/Sa  Motor intact T/SP/DP  WWP extremities  FCDs in place and " functioning    Significant Labs:   CBC:   Recent Labs   Lab 12/27/19  0318 12/27/19  1555 12/28/19  0006   WBC 9.27  9.27 10.08 9.99   HGB 9.2*  9.2* 8.4* 8.2*   HCT 27.3*  27.3* 25.4* 25.4*     195 178 190     CMP:   Recent Labs   Lab 12/26/19  1152 12/27/19  0318   * 131*  131*   K 3.1* 3.5  3.5   CL 97 100  100   CO2 23 22*  22*   * 149*  149*   BUN 23 23  23   CREATININE 0.8 0.8  0.8   CALCIUM 10.0 8.9  8.9   PROT 7.0 6.1  6.1   ALBUMIN 3.7 3.0*  3.0*   BILITOT 0.7 0.7  0.7   ALKPHOS 109 84  84   AST 24 21  21   ALT 12 10  10   ANIONGAP 13 9  9   EGFRNONAA >60.0 >60.0  >60.0     Coagulation:   Recent Labs   Lab 12/26/19  1152   LABPROT 10.7   INR 1.0     All pertinent labs within the past 24 hours have been reviewed.    Significant Imaging: None

## 2019-12-29 PROBLEM — I48.91 NEW ONSET ATRIAL FIBRILLATION: Status: RESOLVED | Noted: 2019-12-26 | Resolved: 2019-12-29

## 2019-12-29 PROBLEM — E87.1 HYPONATREMIA: Status: RESOLVED | Noted: 2019-12-26 | Resolved: 2019-12-29

## 2019-12-29 LAB
ALBUMIN SERPL BCP-MCNC: 2.4 G/DL (ref 3.5–5.2)
ALBUMIN SERPL BCP-MCNC: 2.4 G/DL (ref 3.5–5.2)
ALP SERPL-CCNC: 76 U/L (ref 55–135)
ALP SERPL-CCNC: 76 U/L (ref 55–135)
ALT SERPL W/O P-5'-P-CCNC: 10 U/L (ref 10–44)
ALT SERPL W/O P-5'-P-CCNC: 10 U/L (ref 10–44)
ANION GAP SERPL CALC-SCNC: 7 MMOL/L (ref 8–16)
ANION GAP SERPL CALC-SCNC: 7 MMOL/L (ref 8–16)
AST SERPL-CCNC: 22 U/L (ref 10–40)
AST SERPL-CCNC: 22 U/L (ref 10–40)
BASOPHILS # BLD AUTO: 0.02 K/UL (ref 0–0.2)
BASOPHILS # BLD AUTO: 0.03 K/UL (ref 0–0.2)
BASOPHILS # BLD AUTO: 0.03 K/UL (ref 0–0.2)
BASOPHILS NFR BLD: 0.3 % (ref 0–1.9)
BASOPHILS NFR BLD: 0.4 % (ref 0–1.9)
BASOPHILS NFR BLD: 0.4 % (ref 0–1.9)
BILIRUB SERPL-MCNC: 0.6 MG/DL (ref 0.1–1)
BILIRUB SERPL-MCNC: 0.6 MG/DL (ref 0.1–1)
BUN SERPL-MCNC: 18 MG/DL (ref 10–30)
BUN SERPL-MCNC: 18 MG/DL (ref 10–30)
CALCIUM SERPL-MCNC: 9 MG/DL (ref 8.7–10.5)
CALCIUM SERPL-MCNC: 9 MG/DL (ref 8.7–10.5)
CHLORIDE SERPL-SCNC: 103 MMOL/L (ref 95–110)
CHLORIDE SERPL-SCNC: 103 MMOL/L (ref 95–110)
CO2 SERPL-SCNC: 24 MMOL/L (ref 23–29)
CO2 SERPL-SCNC: 24 MMOL/L (ref 23–29)
CREAT SERPL-MCNC: 0.6 MG/DL (ref 0.5–1.4)
CREAT SERPL-MCNC: 0.6 MG/DL (ref 0.5–1.4)
DIFFERENTIAL METHOD: ABNORMAL
EOSINOPHIL # BLD AUTO: 0.3 K/UL (ref 0–0.5)
EOSINOPHIL NFR BLD: 3.3 % (ref 0–8)
EOSINOPHIL NFR BLD: 3.7 % (ref 0–8)
EOSINOPHIL NFR BLD: 3.9 % (ref 0–8)
ERYTHROCYTE [DISTWIDTH] IN BLOOD BY AUTOMATED COUNT: 12.6 % (ref 11.5–14.5)
ERYTHROCYTE [DISTWIDTH] IN BLOOD BY AUTOMATED COUNT: 12.8 % (ref 11.5–14.5)
ERYTHROCYTE [DISTWIDTH] IN BLOOD BY AUTOMATED COUNT: 13 % (ref 11.5–14.5)
EST. GFR  (AFRICAN AMERICAN): >60 ML/MIN/1.73 M^2
EST. GFR  (AFRICAN AMERICAN): >60 ML/MIN/1.73 M^2
EST. GFR  (NON AFRICAN AMERICAN): >60 ML/MIN/1.73 M^2
EST. GFR  (NON AFRICAN AMERICAN): >60 ML/MIN/1.73 M^2
GLUCOSE SERPL-MCNC: 118 MG/DL (ref 70–110)
GLUCOSE SERPL-MCNC: 118 MG/DL (ref 70–110)
HCT VFR BLD AUTO: 24.9 % (ref 37–48.5)
HCT VFR BLD AUTO: 26.1 % (ref 37–48.5)
HCT VFR BLD AUTO: 26.3 % (ref 37–48.5)
HGB BLD-MCNC: 8.2 G/DL (ref 12–16)
HGB BLD-MCNC: 8.4 G/DL (ref 12–16)
HGB BLD-MCNC: 8.6 G/DL (ref 12–16)
IMM GRANULOCYTES # BLD AUTO: 0.03 K/UL (ref 0–0.04)
IMM GRANULOCYTES NFR BLD AUTO: 0.4 % (ref 0–0.5)
LYMPHOCYTES # BLD AUTO: 1.7 K/UL (ref 1–4.8)
LYMPHOCYTES # BLD AUTO: 1.8 K/UL (ref 1–4.8)
LYMPHOCYTES # BLD AUTO: 1.9 K/UL (ref 1–4.8)
LYMPHOCYTES NFR BLD: 23.2 % (ref 18–48)
LYMPHOCYTES NFR BLD: 24.8 % (ref 18–48)
LYMPHOCYTES NFR BLD: 24.9 % (ref 18–48)
MAGNESIUM SERPL-MCNC: 2 MG/DL (ref 1.6–2.6)
MCH RBC QN AUTO: 32.2 PG (ref 27–31)
MCH RBC QN AUTO: 32.9 PG (ref 27–31)
MCH RBC QN AUTO: 33.1 PG (ref 27–31)
MCHC RBC AUTO-ENTMCNC: 31.9 G/DL (ref 32–36)
MCHC RBC AUTO-ENTMCNC: 32.9 G/DL (ref 32–36)
MCHC RBC AUTO-ENTMCNC: 33 G/DL (ref 32–36)
MCV RBC AUTO: 100 FL (ref 82–98)
MCV RBC AUTO: 100 FL (ref 82–98)
MCV RBC AUTO: 101 FL (ref 82–98)
MONOCYTES # BLD AUTO: 0.6 K/UL (ref 0.3–1)
MONOCYTES # BLD AUTO: 0.8 K/UL (ref 0.3–1)
MONOCYTES # BLD AUTO: 0.9 K/UL (ref 0.3–1)
MONOCYTES NFR BLD: 11 % (ref 4–15)
MONOCYTES NFR BLD: 11 % (ref 4–15)
MONOCYTES NFR BLD: 9.4 % (ref 4–15)
NEUTROPHILS # BLD AUTO: 4.1 K/UL (ref 1.8–7.7)
NEUTROPHILS # BLD AUTO: 4.6 K/UL (ref 1.8–7.7)
NEUTROPHILS # BLD AUTO: 4.8 K/UL (ref 1.8–7.7)
NEUTROPHILS NFR BLD: 60 % (ref 38–73)
NEUTROPHILS NFR BLD: 61.1 % (ref 38–73)
NEUTROPHILS NFR BLD: 61.4 % (ref 38–73)
NRBC BLD-RTO: 0 /100 WBC
OB PNL STL: NEGATIVE
PHOSPHATE SERPL-MCNC: 3.3 MG/DL (ref 2.7–4.5)
PLATELET # BLD AUTO: 218 K/UL (ref 150–350)
PLATELET # BLD AUTO: 221 K/UL (ref 150–350)
PLATELET # BLD AUTO: 262 K/UL (ref 150–350)
PMV BLD AUTO: 9.5 FL (ref 9.2–12.9)
PMV BLD AUTO: 9.6 FL (ref 9.2–12.9)
PMV BLD AUTO: 9.7 FL (ref 9.2–12.9)
POTASSIUM SERPL-SCNC: 3.9 MMOL/L (ref 3.5–5.1)
POTASSIUM SERPL-SCNC: 3.9 MMOL/L (ref 3.5–5.1)
PROT SERPL-MCNC: 5.9 G/DL (ref 6–8.4)
PROT SERPL-MCNC: 5.9 G/DL (ref 6–8.4)
RBC # BLD AUTO: 2.49 M/UL (ref 4–5.4)
RBC # BLD AUTO: 2.6 M/UL (ref 4–5.4)
RBC # BLD AUTO: 2.61 M/UL (ref 4–5.4)
SODIUM SERPL-SCNC: 134 MMOL/L (ref 136–145)
SODIUM SERPL-SCNC: 134 MMOL/L (ref 136–145)
WBC # BLD AUTO: 6.69 K/UL (ref 3.9–12.7)
WBC # BLD AUTO: 7.63 K/UL (ref 3.9–12.7)
WBC # BLD AUTO: 7.79 K/UL (ref 3.9–12.7)

## 2019-12-29 PROCEDURE — 20600001 HC STEP DOWN PRIVATE ROOM

## 2019-12-29 PROCEDURE — 36415 COLL VENOUS BLD VENIPUNCTURE: CPT

## 2019-12-29 PROCEDURE — 84100 ASSAY OF PHOSPHORUS: CPT

## 2019-12-29 PROCEDURE — 93005 ELECTROCARDIOGRAM TRACING: CPT

## 2019-12-29 PROCEDURE — 99232 SBSQ HOSP IP/OBS MODERATE 35: CPT | Mod: ,,, | Performed by: HOSPITALIST

## 2019-12-29 PROCEDURE — 93010 ELECTROCARDIOGRAM REPORT: CPT | Mod: ,,, | Performed by: INTERNAL MEDICINE

## 2019-12-29 PROCEDURE — 63600175 PHARM REV CODE 636 W HCPCS: Performed by: HOSPITALIST

## 2019-12-29 PROCEDURE — 82272 OCCULT BLD FECES 1-3 TESTS: CPT

## 2019-12-29 PROCEDURE — 99232 PR SUBSEQUENT HOSPITAL CARE,LEVL II: ICD-10-PCS | Mod: ,,, | Performed by: HOSPITALIST

## 2019-12-29 PROCEDURE — 83735 ASSAY OF MAGNESIUM: CPT

## 2019-12-29 PROCEDURE — 51798 US URINE CAPACITY MEASURE: CPT

## 2019-12-29 PROCEDURE — 85025 COMPLETE CBC W/AUTO DIFF WBC: CPT | Mod: 91

## 2019-12-29 PROCEDURE — 25000003 PHARM REV CODE 250: Performed by: HOSPITALIST

## 2019-12-29 PROCEDURE — 80053 COMPREHEN METABOLIC PANEL: CPT

## 2019-12-29 PROCEDURE — 93010 EKG 12-LEAD: ICD-10-PCS | Mod: ,,, | Performed by: INTERNAL MEDICINE

## 2019-12-29 PROCEDURE — 25000003 PHARM REV CODE 250: Performed by: PHYSICIAN ASSISTANT

## 2019-12-29 RX ORDER — FERROUS SULFATE 325(65) MG
325 TABLET, DELAYED RELEASE (ENTERIC COATED) ORAL DAILY
Status: DISCONTINUED | OUTPATIENT
Start: 2019-12-29 | End: 2020-01-02 | Stop reason: HOSPADM

## 2019-12-29 RX ADMIN — FERROUS SULFATE TAB EC 325 MG (65 MG FE EQUIVALENT) 325 MG: 325 (65 FE) TABLET DELAYED RESPONSE at 08:12

## 2019-12-29 RX ADMIN — PANTOPRAZOLE SODIUM 40 MG: 40 TABLET, DELAYED RELEASE ORAL at 08:12

## 2019-12-29 RX ADMIN — OXYCODONE HYDROCHLORIDE 5 MG: 5 SOLUTION ORAL at 11:12

## 2019-12-29 RX ADMIN — POLYETHYLENE GLYCOL 3350 17 G: 17 POWDER, FOR SOLUTION ORAL at 08:12

## 2019-12-29 RX ADMIN — ACETAMINOPHEN 1000 MG: 500 TABLET ORAL at 03:12

## 2019-12-29 RX ADMIN — SENNOSIDES AND DOCUSATE SODIUM 2 TABLET: 8.6; 5 TABLET ORAL at 08:12

## 2019-12-29 RX ADMIN — ACETAMINOPHEN 500 MG: 500 TABLET ORAL at 08:12

## 2019-12-29 NOTE — PLAN OF CARE
Pt free of falls and injury during shift. POC reviewed with pt VS stable and AAox4. A. Fib On telemetry, HR 80-90s. SCDs applied. Frequent weight shift provider. PW in place with no complaints. No acute events noted at this time. No complaints. Educated pt why she is at risk for falls and to use call light for assistance ambulating. Yellow non-slip socks on pt. Bed low and locked, call light with in reach. Will continue to monitor.

## 2019-12-29 NOTE — PLAN OF CARE
[facility]     Department of Hospital Medicine     [address] [place] [address]     [phone] after hours  [phone] fax       SKILLED NURSING FACILITY ORDERS    Patient Name: Elisha Young  YOB: 1927 12/29/2019    Admit to Nursing Home:     Skilled Bed      Diagnoses:  Active Hospital Problems    Diagnosis  POA    *Closed fracture of ramus of right pubis [S32.591A]  Unknown    Alteration in skin integrity [R23.9]  Yes    New onset atrial fibrillation [I48.91]  Yes    Fall [W19.XXXA]  Yes    Hyponatremia [E87.1]  Unknown    Anemia [D64.9]  Unknown    Hypertension [I10]  Yes    Sinus arrhythmia [I49.8]  Yes    Diastolic dysfunction [I51.89]  Yes    Hypokalemia [E87.6]  Yes      Resolved Hospital Problems   No resolved problems to display.       Patient is homebound due to:  Closed fracture of ramus of right pubis    Allergies:Review of patient's allergies indicates:  No Known Allergies    Vitals:       Every shift (Skilled Nursing patients)    Diet: cardiac diet       Acitivities:    - Up in a chair each morning as tolerated  - Ambulate with assistance to bathroom  - Scheduled walks once each shift (every 8 hours)  - May ambulate independently  - May use walker, cane, or self-propelled wheelchair       - Weight bearing:  as tolerted right lower extremity    LABS:  Per facility protocol    Nursing Precautions:        - Fall precautions per nursing home protocol   - Decubitus precautions:        -  for positioning   - Pressure reducing foam mattress   - Turn patient every two hours. Use wedge pillows to anchor patient    CONSULTS:      Physical Therapy to evaluate and treat     Occupational Therapy to evaluate and treat        MISCELLANEOUS CARE:       Routine Skin for Bedridden Patients:  Apply moisture barrier cream to all    skin folds and wet areas in perineal area daily and after baths and                            all bowel movements.                   DIABETES CARE:  N/A    Medications: Discontinue all previous medication orders, if any. See new list below.   Elisha Young   Home Medication Instructions JABARI:[id number]    Printed on:12/31/19 0654   Medication Information                      acetaminophen (TYLENOL) 325 MG tablet  Take 2 tablets (650 mg total) by mouth every 6 (six) hours as needed.             ANTIOX #11/OM3/DHA/EPA/LUT/SATYA (OCUVITE ADULT 50+ ORAL)  Take by mouth.             ferrous sulfate 325 (65 FE) MG EC tablet  Take 1 tablet (325 mg total) by mouth once daily.             oxyCODONE (ROXICODONE) 5 mg/5 mL Soln  Take 5 mLs (5 mg total) by mouth every 12 (twelve) hours as needed (severe pain).             senna-docusate 8.6-50 mg (PERICOLACE) 8.6-50 mg per tablet  Take 2 tablets by mouth once daily.             simvastatin (ZOCOR) 20 MG tablet PO Daily                   _________________________________  Larry Boateng MD  12/29/2019

## 2019-12-29 NOTE — PROGRESS NOTES
Progress Note  Hospital Medicine    Admit Date: 12/26/2019  Length of Stay:  LOS: 3 days     SUBJECTIVE:         Follow-up For:  Closed fracture of ramus of right pubis    HPI/Interval history (See H&P for complete P,F,SHx) :     Elisha Young is a 89 y.o. female with a PMH of HTN, HLD, Diastolic dysfunction , sinus arrhythmia  who presented to the ED on 12/26/2019 diagnosed with  Hip Pain (Right hip pain s/p falll on Tuesday night. Pt having difficulty with ambulation. Abrasion noted to right upper arm. )  AAOX 3, accompanied by son and grand daughter . mentions that fell 2 days ago on her way out of bathroom  She states that she tripped and fell, striking her right forearm on a door and her right hip on a wood floor. denies palpitations, lightheadedness prior to fall. denies head trauma or loss of consciousness.  Since then, able to get herself her up to the chair and informed her son. she has been able to ambulate with discomfort, refused to come to ER due to madelin.  She reports an associated bruise to the right hip area.  Upon striking the door with her right arm, she sustained a skin tear which has been actively bleeding since the fall.  Her family has been bandaging this with some relief of her bleeding.  while in the ER , upon placing the patient on a cardiac monitor, it is noted with tachardia -  atrial fibrillation with rapid ventricular response? at 140s improved to 100s after IV Metoprolol 5mg x 1.  she states that she has been told in the past that she has some irregular heartbeat.  However, she is not currently being treated for it, prior cardiologist offered her nuclear stress test but patient refused it.  She denies any numbness, weakness, dizziness, chest pain, shortness of breath, or palpitations.  She denies any other complaints.     lives alone in [facility]. ADL/IADL independent at baseline      12/27/2019  Status post orthopedic surgery evaluation - WBAT to RLE. No orthopaedic surgical  intervention at this time. The fractures are very likely amenable to non-operative treatment, particularly considering the patients advanced age. \ patient will likely benefit from placement in IP Rehab vs. SNF to work with PT, however at this time, the patient is adamantly refusing any such arrangement and wishes to go home following hospital workup.   12/28/19  Hb at 8.2. hypokalemia replaced. Stood with therapy yesterday. Attempted to ambulate but pain was quite significant. Will need post mobilization films after ambulating. Will fu in ortho clinic in 2 weeks.started on iron and oxycodone   12/29/19  Bladder scan showed 520 mL in bladder. Straight cath completed 450 mL          Review of Systems:   Pain scale: Constitutional: Positive for activity change. Negative for appetite change, chills, fatigue, fever and unexpected weight change.   HENT: Negative for congestion, ear discharge, ear pain, facial swelling and sinus pain.    Eyes: Negative for pain, discharge, redness and itching.   Respiratory: Negative for cough, choking, chest tightness, shortness of breath and wheezing.    Cardiovascular: Negative for chest pain, palpitations and leg swelling.   Gastrointestinal: Negative for abdominal distention, abdominal pain, anal bleeding, blood in stool, constipation, diarrhea, nausea and vomiting.   Endocrine: Negative for cold intolerance.   Genitourinary: Positive for pelvic pain. Negative for dysuria, flank pain, frequency and hematuria.   Musculoskeletal: Positive for arthralgias (right pelvic pain with movement and ambulation) and gait problem (since fall -). Negative for back pain, joint swelling, neck pain and neck stiffness.   Neurological: Negative for dizziness, syncope, speech difficulty, weakness, light-headedness and headaches.   Hematological: Negative for adenopathy.   Psychiatric/Behavioral: Negative for agitation, confusion, dysphoric mood and suicidal ideas.     OBJECTIVE:     Vital Signs Range  (Last 24H):  Temp:  [97.6 °F (36.4 °C)-99.1 °F (37.3 °C)]   Pulse:  []   Resp:  [16-18]   BP: (102-139)/(55-66)   SpO2:  [91 %-98 %]     Physical Exam:  Constitutional: She is oriented to person, place, and time. She appears well-developed and well-nourished.   thin built    HENT:   Head: Normocephalic and atraumatic.   Mouth/Throat: Oropharynx is clear and moist. No oropharyngeal exudate.   Eyes: Pupils are equal, round, and reactive to light. Conjunctivae and EOM are normal. Right eye exhibits no discharge. Left eye exhibits no discharge.   diminished vision left eye   Neck: Normal range of motion. Neck supple. No JVD present. No tracheal deviation present. No thyromegaly present.   Cardiovascular: Normal rate, regular rhythm and normal heart sounds. Exam reveals no gallop and no friction rub.   No murmur heard.  Pulmonary/Chest: Effort normal and breath sounds normal. No stridor. No respiratory distress. She has no wheezes. She has no rales.   Abdominal: Soft. Bowel sounds are normal. She exhibits no distension and no mass. There is no tenderness. There is no guarding.   Musculoskeletal: Normal range of motion. She exhibits no edema.   diminished ROM  right hip - attributes to pain in right pelvis    Lymphadenopathy:     She has no cervical adenopathy.   Neurological: She is oriented to person, place, and time. No cranial nerve deficit.   Skin: Skin is warm and dry.   right hip and gluteal bruise   Psychiatric: She has a normal mood and affect.   Nursing note and vitals reviewed.       Medications:  Medication list was reviewed and changes noted under Assessment/Plan.      Current Facility-Administered Medications:     acetaminophen tablet 1,000 mg, 1,000 mg, Oral, TID, Porter Starr PA-C, 1,000 mg at 12/28/19 2029    acetaminophen tablet 650 mg, 650 mg, Oral, Q6H PRN, Larry Boateng MD, 650 mg at 12/27/19 1331    albuterol-ipratropium 2.5 mg-0.5 mg/3 mL nebulizer solution 3 mL, 3 mL, Nebulization,  Q4H PRN, Porter Starr PA-C    bisacodyl suppository 10 mg, 10 mg, Rectal, Daily PRN, Porter Starr PA-C    dextrose 10% (D10W) Bolus, 12.5 g, Intravenous, PRN, Larry Boateng MD    dextrose 10% (D10W) Bolus, 25 g, Intravenous, PRN, Larry Boateng MD    glucagon (human recombinant) injection 1 mg, 1 mg, Intramuscular, PRN, Larry Boateng MD    glucose chewable tablet 16 g, 16 g, Oral, PRN, Larry Boateng MD    glucose chewable tablet 24 g, 24 g, Oral, PRN, Larry Boateng MD    ibuprofen tablet 400 mg, 400 mg, Oral, Q6H PRN, Porter Starr PA-C, 400 mg at 12/27/19 2110    melatonin tablet 6 mg, 6 mg, Oral, Nightly PRN, Porter Starr PA-C    ondansetron injection 4 mg, 4 mg, Intravenous, Q8H PRN, Larry Boateng MD    oxyCODONE 5 mg/5 mL solution 5 mg, 5 mg, Oral, Q12H PRN, Larry Boateng MD, 5 mg at 12/28/19 1113    pantoprazole EC tablet 40 mg, 40 mg, Oral, Daily, Porter Starr PA-C, 40 mg at 12/28/19 0958    polyethylene glycol packet 17 g, 17 g, Oral, Daily, Porter Starr PA-C, 17 g at 12/28/19 0957    promethazine (PHENERGAN) 6.25 mg in dextrose 5 % 50 mL IVPB, 6.25 mg, Intravenous, Q6H PRN, Porter Starr PA-C    senna-docusate 8.6-50 mg per tablet 2 tablet, 2 tablet, Oral, Daily, Larry Boateng MD, 2 tablet at 12/28/19 1113    sodium chloride 0.9% flush 10 mL, 10 mL, Intravenous, PRN, Kenji Hill MD    sodium chloride 0.9% flush 10 mL, 10 mL, Intravenous, PRN, Larry Boateng MD    acetaminophen, albuterol-ipratropium, bisacodyl, Dextrose 10% Bolus, Dextrose 10% Bolus, glucagon (human recombinant), glucose, glucose, ibuprofen, melatonin, ondansetron, oxyCODONE, promethazine (PHENERGAN) IVPB, sodium chloride 0.9%, sodium chloride 0.9%    Laboratory/Diagnostic Data:  Reviewed and noted in plan where applicable- Please see chart for full lab data.    Recent Labs   Lab 12/28/19  0805 12/28/19  1602 12/29/19  0005   WBC 8.16 8.05 7.63   HGB 8.6* 8.8* 8.2*    HCT 26.0* 26.3* 24.9*    219 218       Recent Labs   Lab 12/27/19  0318 12/28/19  0510 12/29/19  0458   *  131* 130*  130* 134*  134*   K 3.5  3.5 3.3*  3.3* 3.9  3.9     100 97  97 103  103   CO2 22*  22* 25  25 24  24   BUN 23  23 21  21 18  18   CREATININE 0.8  0.8 0.7  0.7 0.6  0.6   *  149* 126*  126* 118*  118*   CALCIUM 8.9  8.9 9.2  9.2 9.0  9.0   MG 1.9 2.0 2.0   PHOS 2.7 3.2 3.3       Recent Labs   Lab 12/26/19  1152 12/27/19 0318 12/28/19  0510 12/29/19  0458   ALKPHOS 109 84  84 83  83 76  76   ALT 12 10  10 10  10 10  10   AST 24 21  21 23  23 22  22   ALBUMIN 3.7 3.0*  3.0* 2.8*  2.8* 2.4*  2.4*   PROT 7.0 6.1  6.1 6.2  6.2 5.9*  5.9*   BILITOT 0.7 0.7  0.7 1.0  1.0 0.6  0.6   INR 1.0  --   --   --         Microbiology labs for the last week  Microbiology Results (last 7 days)     ** No results found for the last 168 hours. **           Imaging Results           CT Pelvis Without Contrast (Final result)  Result time 12/26/19 16:53:50    Final result by Roger Pearl Jr., MD (12/26/19 16:53:50)             Impression:      Fractures involving the right superior and inferior pubic rami and right sacrum with associated right nayeli-pelvic hematoma as detailed above.    This report was flagged in Epic as abnormal.    COMMUNICATION  This critical result was discovered/received at 1557. The critical information above was relayed directly by Kael Camp MD by telephone to MD Sergio on 12/26/2019 at 1558.    Electronically signed by resident: Kael Camp MD  Date:    12/26/2019  Time:    15:30    Electronically signed by: Roger Pearl MD  Date:    12/26/2019  Time:    16:53           Narrative:    EXAMINATION:  CT PELVIS WITHOUT CONTRAST    CLINICAL HISTORY:  Pelvic fracture, known or suspected;    TECHNIQUE:  Low dose axial images, sagittal and coronal reformations were obtained from the iliac crests to the pubic symphysis without  the administration of intravenous contrast.    COMPARISON:  Hip radiograph 12/26/2019    FINDINGS:  Visualized loops of small large bowel reveal no evidence of obstruction or inflammation.  There scattered colonic diverticula.  No pelvic ascites, intraperitoneal free air, or abdominopelvic lymphadenopathy identified.    The urinary bladder is grossly unremarkable. Operative change of hysterectomy.    Visualized abdominal aorta reveals moderate calcific atherosclerosis extending into the branch vessels.    There is a comminuted slightly displaced fracture involving the right superior pubic ramus.  There is a mildly displaced fracture involving the medial aspect of the right inferior pubic ramus (sagittal image 102 and coronal image 78.)  An additional nondisplaced fracture is noted in the right sacral ala near the sacroiliac joint (axial series 2, image 158 and coronal image 97) which extends vertically involving nearly the entire craniocaudal dimension of the right nayeli sacrum.  There is no disruption of the sacroiliac joints or pubic symphysis.  There is small volume pelvic free fluid layering along the right lateral aspect of the bladder, in the presacral soft tissues, and extending along the right iliacus muscle belly likely hematoma.  Moderate joint space narrowing of the iliofemoral joints bilaterally.  Advanced degenerative change of the distal lumbar spine.    The extra-pelvic soft tissues reveal mild subcutaneous edema, and inflammatory/contusion fat stranding overlying the right greater trochanter.                             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  No fracture dislocation bone destruction  seen.      Electronically signed by: Javier Zamora MD  Date:    12/26/2019  Time:    13:18                           X-Ray Hip 2 View Right (Final result)  Result time 12/26/19 13:18:18    Final result by Javier Zamora III, MD (12/26/19 13:18:18)             Narrative:    EXAMINATION:  XR HIP 2 VIEW RIGHT    CLINICAL HISTORY:  fall;    FINDINGS:  Two views right: There are possible fractures of the right superior and inferior pubic ramus.  There is baseline DJD.  No femur fracture seen.  CT could be helpful.  There could be an acetabular fracture as well.      Electronically signed by: Javier Zamora MD  Date:    12/26/2019  Time:    13:18                           X-Ray Chest AP Portable (Final result)  Result time 12/26/19 13:07:03    Final result by Perla Rutledge MD (12/26/19 13:07:03)             Impression:      No source for chest pain established on this study.      Electronically signed by: Perla Rutledge MD  Date:    12/26/2019  Time:    13:07           Narrative:    EXAMINATION:  XR CHEST AP PORTABLE    CLINICAL HISTORY:  Chest Pain;    TECHNIQUE:  Single frontal view of the chest was performed.    COMPARISON:  12/16/2015.    FINDINGS:  The patient has thoracic dextrocurvature.  Allowing for this, mediastinal structures are midline.  Descending thoracic aorta is tortuous and atherosclerotic.  There is also abundant calcific atherosclerosis in the proximal right common carotid artery.    Allowing for calcified costochondral cartilage I detect no acute intrathoracic disease.  The source of the patient's chest pain is not established on this study.    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    I detect no pneumothorax, pneumomediastinum or pneumoperitoneum.    Advanced degenerative changes are present at the shoulders in this 89-year-old woman.                              Estimated body mass index is 21.81 kg/m² as calculated from the following:    Height as of  "this encounter: 5' 2" (1.575 m).    Weight as of this encounter: 54.1 kg (119 lb 4.3 oz).    I & O (Last 24H):    Intake/Output Summary (Last 24 hours) at 12/29/2019 0733  Last data filed at 12/29/2019 0600  Gross per 24 hour   Intake 810 ml   Output 1250 ml   Net -440 ml       Body mass index is 21.81 kg/m².    Estimated Creatinine Clearance: 47.3 mL/min (based on SCr of 0.6 mg/dL).    ASSESSMENT/PLAN:     Active Problems:    Active Diagnoses:     Diagnosis Date Noted POA    PRINCIPAL PROBLEM:  Closed fracture of ramus of right pubis [S32.591A]. mentions that fell 2 days ago on her way out of bathroom  She states that she tripped and fell, striking her right forearm on a door and her right hip on a wood floor. denies palpitations, lightheadedness prior to fall. denies head trauma or loss of consciousness.  X ray right hip - possible fractures of the right superior and inferior pubic ramus   CT pelvis without contrast - Fractures involving the right superior and inferior pubic rami and right sacrum with associated right nayeli-pelvic hematoma as detailed above. orthopedic surgery consulted   12/27/2019  Status post orthopedic surgery evaluation - WBAT to RLE. No orthopaedic surgical intervention at this time. The fractures are very likely amenable to non-operative treatment, particularly considering the patients advanced age. \ patient will likely benefit from placement in IP Rehab vs. SNF to work with PT, however at this time, the patient is adamantly refusing any such arrangement and wishes to go home following hospital workup.   12/28/19  Hb at 8.2. hypokalemia replaced. Stood with therapy yesterday. Attempted to ambulate but pain was quite significant. Will need post mobilization films after ambulating. Will fu in ortho clinic in 2 weeks. . Will fu in ortho clinic in 2 weeks.started on iron and oxycodone    12/26/2019 Unknown    New onset atrial fibrillation ?[I48.91]EKG - Sinus tachycardia with frequent " Premature atrial complexes  while in the ER , upon placing the patient on a cardiac monitor, it is noted with tachardia -  atrial fibrillation with rapid ventricular response? at 140s improved to 100s after IV Metoprolol 5mg x 1.  she states that she has been told in the past that she has some irregular heartbeat.  However, she is not currently being treated for it, prior cardiologist offered her nuclear stress test but patient refused it.  continue Metoprolol as needed. discussed with cardiology. no evidence of afib per EKG in the hospital   12/26/2019 Yes    Fall [W19.XXXA]fall precautions 12/26/2019 Yes    Hyponatremia [E87.1]sodium at 133. monitor  12/27/2019 sodium at 131 12/26/2019 Unknown    Anemia [D64.9]Hb 13.3 to 10.4 overlast 3 days.blood loss from skin tear RUE vs bruise secondary to fall.monitor.hold ASA for now   01/27/2019 hemoglobin dropped to 9.2- likely from right hemipelvis hematoma monitor for stabilization 12/26/2019 Unknown    Hypertension [I10]blood pressure controlled off medications 09/25/2013 Yes    Sinus arrhythmia [I49.8]as above  09/25/2013 Yes    Diastolic dysfunction [I51.89]does not seem to be decompensated. I/O  Monitor   Mild elevated troponin 0.127-0.086 denies chest complains.  EKG without ischemic changes 09/25/2013 Yes    Hypokalemia [E87.6]3.5 replaced  urinary retention - 12/29/19  Bladder scan showed 520 mL in bladder. Straight cath completed 450 mL 09/25/2013 Yes       Problems Resolved During this Admission:         Disposition- . patient agrreable for SNF    DVT prophylaxis addressed with:  Bilateral SCDs            Larry Boateng MD  Attending Staff Physician  [facility] Medicine  pager- [phone] [phone] - [phone]

## 2019-12-29 NOTE — PROGRESS NOTES
RUI Colin made aware pt did not have output this morning. Bladder scan showed 520 mL in bladder. Straight cath completed 450 mL came out. No new orders given. Will continue to monitor

## 2019-12-30 LAB
ALBUMIN SERPL BCP-MCNC: 2.4 G/DL (ref 3.5–5.2)
ALBUMIN SERPL BCP-MCNC: 2.4 G/DL (ref 3.5–5.2)
ALP SERPL-CCNC: 87 U/L (ref 55–135)
ALP SERPL-CCNC: 87 U/L (ref 55–135)
ALT SERPL W/O P-5'-P-CCNC: 15 U/L (ref 10–44)
ALT SERPL W/O P-5'-P-CCNC: 15 U/L (ref 10–44)
ANION GAP SERPL CALC-SCNC: 8 MMOL/L (ref 8–16)
ANION GAP SERPL CALC-SCNC: 8 MMOL/L (ref 8–16)
AST SERPL-CCNC: 24 U/L (ref 10–40)
AST SERPL-CCNC: 24 U/L (ref 10–40)
BASOPHILS # BLD AUTO: 0.03 K/UL (ref 0–0.2)
BASOPHILS NFR BLD: 0.5 % (ref 0–1.9)
BILIRUB SERPL-MCNC: 0.6 MG/DL (ref 0.1–1)
BILIRUB SERPL-MCNC: 0.6 MG/DL (ref 0.1–1)
BUN SERPL-MCNC: 13 MG/DL (ref 10–30)
BUN SERPL-MCNC: 13 MG/DL (ref 10–30)
CALCIUM SERPL-MCNC: 9.4 MG/DL (ref 8.7–10.5)
CALCIUM SERPL-MCNC: 9.4 MG/DL (ref 8.7–10.5)
CHLORIDE SERPL-SCNC: 101 MMOL/L (ref 95–110)
CHLORIDE SERPL-SCNC: 101 MMOL/L (ref 95–110)
CO2 SERPL-SCNC: 25 MMOL/L (ref 23–29)
CO2 SERPL-SCNC: 25 MMOL/L (ref 23–29)
CREAT SERPL-MCNC: 0.6 MG/DL (ref 0.5–1.4)
CREAT SERPL-MCNC: 0.6 MG/DL (ref 0.5–1.4)
DIFFERENTIAL METHOD: ABNORMAL
EOSINOPHIL # BLD AUTO: 0.4 K/UL (ref 0–0.5)
EOSINOPHIL NFR BLD: 5.8 % (ref 0–8)
ERYTHROCYTE [DISTWIDTH] IN BLOOD BY AUTOMATED COUNT: 12.8 % (ref 11.5–14.5)
EST. GFR  (AFRICAN AMERICAN): >60 ML/MIN/1.73 M^2
EST. GFR  (AFRICAN AMERICAN): >60 ML/MIN/1.73 M^2
EST. GFR  (NON AFRICAN AMERICAN): >60 ML/MIN/1.73 M^2
EST. GFR  (NON AFRICAN AMERICAN): >60 ML/MIN/1.73 M^2
FOLATE SERPL-MCNC: 15.6 NG/ML (ref 4–24)
GLUCOSE SERPL-MCNC: 105 MG/DL (ref 70–110)
GLUCOSE SERPL-MCNC: 105 MG/DL (ref 70–110)
HCT VFR BLD AUTO: 25.2 % (ref 37–48.5)
HGB BLD-MCNC: 8.1 G/DL (ref 12–16)
IMM GRANULOCYTES # BLD AUTO: 0.03 K/UL (ref 0–0.04)
IMM GRANULOCYTES NFR BLD AUTO: 0.5 % (ref 0–0.5)
LYMPHOCYTES # BLD AUTO: 2.1 K/UL (ref 1–4.8)
LYMPHOCYTES NFR BLD: 33.3 % (ref 18–48)
MAGNESIUM SERPL-MCNC: 1.9 MG/DL (ref 1.6–2.6)
MCH RBC QN AUTO: 32.7 PG (ref 27–31)
MCHC RBC AUTO-ENTMCNC: 32.1 G/DL (ref 32–36)
MCV RBC AUTO: 102 FL (ref 82–98)
MONOCYTES # BLD AUTO: 0.7 K/UL (ref 0.3–1)
MONOCYTES NFR BLD: 11.7 % (ref 4–15)
NEUTROPHILS # BLD AUTO: 3 K/UL (ref 1.8–7.7)
NEUTROPHILS NFR BLD: 48.2 % (ref 38–73)
NRBC BLD-RTO: 0 /100 WBC
PHOSPHATE SERPL-MCNC: 3.4 MG/DL (ref 2.7–4.5)
PLATELET # BLD AUTO: 253 K/UL (ref 150–350)
PMV BLD AUTO: 10.5 FL (ref 9.2–12.9)
POTASSIUM SERPL-SCNC: 4 MMOL/L (ref 3.5–5.1)
POTASSIUM SERPL-SCNC: 4 MMOL/L (ref 3.5–5.1)
PROT SERPL-MCNC: 5.9 G/DL (ref 6–8.4)
PROT SERPL-MCNC: 5.9 G/DL (ref 6–8.4)
RBC # BLD AUTO: 2.48 M/UL (ref 4–5.4)
SODIUM SERPL-SCNC: 134 MMOL/L (ref 136–145)
SODIUM SERPL-SCNC: 134 MMOL/L (ref 136–145)
VIT B12 SERPL-MCNC: 470 PG/ML (ref 210–950)
WBC # BLD AUTO: 6.24 K/UL (ref 3.9–12.7)

## 2019-12-30 PROCEDURE — 82607 VITAMIN B-12: CPT

## 2019-12-30 PROCEDURE — 97535 SELF CARE MNGMENT TRAINING: CPT

## 2019-12-30 PROCEDURE — 82746 ASSAY OF FOLIC ACID SERUM: CPT

## 2019-12-30 PROCEDURE — 20600001 HC STEP DOWN PRIVATE ROOM

## 2019-12-30 PROCEDURE — 30200315 PPD INTRADERMAL TEST REV CODE 302: Performed by: HOSPITALIST

## 2019-12-30 PROCEDURE — 83735 ASSAY OF MAGNESIUM: CPT

## 2019-12-30 PROCEDURE — 63600175 PHARM REV CODE 636 W HCPCS: Performed by: HOSPITALIST

## 2019-12-30 PROCEDURE — 99232 SBSQ HOSP IP/OBS MODERATE 35: CPT | Mod: ,,, | Performed by: HOSPITALIST

## 2019-12-30 PROCEDURE — 36415 COLL VENOUS BLD VENIPUNCTURE: CPT

## 2019-12-30 PROCEDURE — 25000003 PHARM REV CODE 250: Performed by: HOSPITALIST

## 2019-12-30 PROCEDURE — 25000003 PHARM REV CODE 250: Performed by: PHYSICIAN ASSISTANT

## 2019-12-30 PROCEDURE — 85025 COMPLETE CBC W/AUTO DIFF WBC: CPT

## 2019-12-30 PROCEDURE — 99232 PR SUBSEQUENT HOSPITAL CARE,LEVL II: ICD-10-PCS | Mod: ,,, | Performed by: HOSPITALIST

## 2019-12-30 PROCEDURE — 86580 TB INTRADERMAL TEST: CPT | Performed by: HOSPITALIST

## 2019-12-30 PROCEDURE — 97530 THERAPEUTIC ACTIVITIES: CPT

## 2019-12-30 PROCEDURE — 80053 COMPREHEN METABOLIC PANEL: CPT

## 2019-12-30 PROCEDURE — 84100 ASSAY OF PHOSPHORUS: CPT

## 2019-12-30 RX ADMIN — PANTOPRAZOLE SODIUM 40 MG: 40 TABLET, DELAYED RELEASE ORAL at 08:12

## 2019-12-30 RX ADMIN — ACETAMINOPHEN 1000 MG: 500 TABLET ORAL at 08:12

## 2019-12-30 RX ADMIN — POLYETHYLENE GLYCOL 3350 17 G: 17 POWDER, FOR SOLUTION ORAL at 08:12

## 2019-12-30 RX ADMIN — ACETAMINOPHEN 1000 MG: 500 TABLET ORAL at 10:12

## 2019-12-30 RX ADMIN — Medication 5 UNITS: at 05:12

## 2019-12-30 RX ADMIN — SENNOSIDES AND DOCUSATE SODIUM 2 TABLET: 8.6; 5 TABLET ORAL at 08:12

## 2019-12-30 RX ADMIN — ACETAMINOPHEN 500 MG: 500 TABLET ORAL at 03:12

## 2019-12-30 RX ADMIN — OXYCODONE HYDROCHLORIDE 5 MG: 5 SOLUTION ORAL at 11:12

## 2019-12-30 RX ADMIN — FERROUS SULFATE TAB EC 325 MG (65 MG FE EQUIVALENT) 325 MG: 325 (65 FE) TABLET DELAYED RESPONSE at 08:12

## 2019-12-30 NOTE — PLAN OF CARE
Pt ready for D/C, needs SNF plcmt per Dr. Boateng.     CM spoke w/SW re: referrals.    PLAN: D/C tomorrow, team updated.    Betsey Guan, RN  [phone]

## 2019-12-30 NOTE — PLAN OF CARE
Goals reviewed and remain appropriate. Pt progressing towards goals.    Corie Morales, PT, DPT   2019  [phone]    Problem: Physical Therapy Goal  Goal: Physical Therapy Goal  Description  Goals to be met by: 1/10/2020     Patient will increase functional independence with mobility by performin. Supine to sit with MInimal Assistance - met   1a. Supine to sit with SBA   2. Sit to stand transfer with Minimal Assistance - met   2a. Sit to stand transfer with SBA using RW  3. Bed to chair transfer with Minimal Assistance using Rolling Walker - met   3a. Bed to chair transfer with SBA using RW  4. Gait  x 25 feet with Minimal Assistance using Rolling Walker.  5. Lower extremity exercise program x15 reps, with supervision, in order to increase LE strength and (I) with functional mobility.        Outcome: Ongoing, Progressing

## 2019-12-30 NOTE — PT/OT/SLP PROGRESS
"Physical Therapy Treatment    Patient Name:  Elisha Young   MRN:  [id number]    Recommendations:     Discharge Recommendations:  nursing facility, skilled   Discharge Equipment Recommendations: none   Barriers to discharge: Inaccessible home and Decreased caregiver support (5 YULY; requires increased assist at this time)    Assessment:     Elisha Young is a 89 y.o. female admitted with a medical diagnosis of Closed fracture of ramus of right pubis.  She presents with the following impairments/functional limitations:  weakness, impaired functional mobilty, impaired endurance, gait instability, impaired balance, impaired self care skills, impaired cardiopulmonary response to activity, decreased lower extremity function. Pt progressing functional mobility, as she was able to complete mobility with less assist needed this date. Pt performed stand pivot transfers with c/o feeling "woozy" but without c/o pain. Ambulation remains limited due to weakness, impaired endurance, and decreased standing balance. Pt would continue to benefit from skilled acute PT in order to address current deficits and progress functional mobility.     Rehab Prognosis: Good; patient would benefit from acute skilled PT services to address these deficits and reach maximum level of function.    Recent Surgery: * No surgery found *      Plan:     During this hospitalization, patient to be seen 4 x/week to address the identified rehab impairments via gait training, therapeutic activities, therapeutic exercises, neuromuscular re-education and progress toward the following goals:    · Plan of Care Expires:  01/25/20    Subjective     Chief Complaint: feeling "woozy" during standing   Patient/Family Comments/goals: "I did good," pt stating following transfer to chair.  Pain/Comfort:  · Pain Rating 1: 0/10(denied pain during session)      Objective:     Communicated with RN prior to session.  Patient found supine with telemetryRadha upon PT entry " to room.     General Precautions: Standard, fall   Orthopedic Precautions:RLE weight bearing as tolerated   Braces: N/A     Functional Mobility:  · Bed Mobility:     · Supine to Sit: minimum assistance with side rail and HOB elevated  · With cues for technique and sequencing   · Transfers:     · Sit to Stand:  minimum assistance with rolling walker, x1 rep from EOB and x1 rep from bedside chair   · Cues for hand placement and transfer technique with RW   · Bed to Chair: minimum assistance with  rolling walker  using  Stand Pivot  Chair to w/c: Geovanni with RW using stand pivot   · Cues throughout for sequencing, technique, RW management, and use of RW to assist with off-loading LE   · Gait: limited to stand pivot transfers bed>chair and chair>w/c with RW and Geovanni      AM-PAC 6 CLICK MOBILITY  Turning over in bed (including adjusting bedclothes, sheets and blankets)?: 3  Sitting down on and standing up from a chair with arms (e.g., wheelchair, bedside commode, etc.): 3  Moving from lying on back to sitting on the side of the bed?: 3  Moving to and from a bed to a chair (including a wheelchair)?: 3  Need to walk in hospital room?: 2  Climbing 3-5 steps with a railing?: 1  Basic Mobility Total Score: 15     `  Therapeutic Activities and Exercises:  Completed functional mobility as described above with education provided on mobility techniques with RW, including use of BUE to assist with off-loading LE  PT returned later in PM to assist pt escort with transferring pt chair>w/c   RW ordered for use during hospital admission    Patient left up in chair with all lines intact, call button in reach, RN notified and pt's niece present..    GOALS:   Multidisciplinary Problems     Physical Therapy Goals        Problem: Physical Therapy Goal    Goal Priority Disciplines Outcome Goal Variances Interventions   Physical Therapy Goal     PT, PT/OT Ongoing, Progressing     Description:  Goals to be met by: 1/10/2020     Patient will  increase functional independence with mobility by performin. Supine to sit with MInimal Assistance - met   1a. Supine to sit with SBA   2. Sit to stand transfer with Minimal Assistance - met   2a. Sit to stand transfer with SBA using RW  3. Bed to chair transfer with Minimal Assistance using Rolling Walker - met   3a. Bed to chair transfer with SBA using RW  4. Gait  x 25 feet with Minimal Assistance using Rolling Walker.  5. Lower extremity exercise program x15 reps, with supervision, in order to increase LE strength and (I) with functional mobility.                         Time Tracking:     PT Received On: 19  PT Start Time: 1226     PT Stop Time: 1250  PT Total Time (min): 24 min   + PT returned from 14:27-14:34 (7 min) to assist pt to w/c for x-ray  Total Treatment Time: 31 minutes    Billable Minutes: Therapeutic Activity 31   (co-tx with OT during 1st session)    Treatment Type: Treatment  PT/PTA: PT     PTA Visit Number: 0     Corie Morales PT, DPT   2019  [phone]

## 2019-12-30 NOTE — PLAN OF CARE
YING called in [facility] and faxed PASRR. Awaiting 142. [facility] to [facility].     3:34 PM  142 in [facility].     Anna Brush, KELECHI  [facility]

## 2019-12-30 NOTE — PLAN OF CARE
Pt maintained free from falls/ trauma/ injuries. Pt has a skin tear on the right upper arm due to recent fall. Pt has severe pelvic pain when moving due to fracture. Control with PRN oxy 5mg an hour prior to ambulating. Pt ambulate and sit in the strecher chair this pm, X-ray was done after ambulation. Pt IV access was lost this shift. Pt family refuse to get  new IV, Dr Boateng notified. TB test was completed on the left FA and marked. To be discharge to SNF 12/31. Q4 neuro checked. Plan of care reviewed. Pt verbalized understanding. All questions and concerns addressed. Will continue to monitor.

## 2019-12-30 NOTE — PLAN OF CARE
Plan of care discussed with patient and family. No questions regarding care plan. Was concerned about being able to work with PT with right hip pain. Oxycodone 5mg ordered and administered. VS stable. Pulse sinus arrhythmia. Patient bedfast. Able to shift weight as needed and with encouragement. Patient to be repositioned Q2h d/t blanchable redness with no weight on right hip. Oxycodone was effective. 18G to LAC intact and flushing without complications. Able to swallow meds whole. Appetite adequate. Will continue to monitor.

## 2019-12-30 NOTE — PLAN OF CARE
Pt free of falls and injury during shift. POC reviewed with pt VS stable and AAox4. sinus arrhythmia On telemetry. SCDs in place. Frequent weight shift provider. PW in place with no complaints. No acute events noted at this time. No complaints. Educated pt why she is at risk for falls and to use call light for assistance ambulating. Yellow non-slip socks on pt. Bed low and locked, call light with in reach. Will continue to monitor.

## 2019-12-30 NOTE — PLAN OF CARE
[facility] SN[facility]. SW left message for pt's son Bull to obtain additional SNF choices. [facility] will follow up.     12/30/19 9135   Post-Acute Status   Post-Acute Authorization Placement   Post-Acute Placement Status Referrals Sent     Anna Brush LMSW  [facility]

## 2019-12-30 NOTE — PLAN OF CARE
Goals reviewed and remain appropriate.  Saadia Beckham OTR/L  Occupational Therapy  Pager #: [phone]  12/30/2019    Problem: Occupational Therapy Goal  Goal: Occupational Therapy Goal  Description  Goals to be met by: 1/10/19     Patient will increase functional independence with ADLs by performing:    LE Dressing with Minimal Assistance using AD   Grooming while standing with Contact Guard Assistance.  Toileting from bedside commode with Minimal Assistance for hygiene and clothing management.   Toilet transfer to bedside commode with Minimal Assistance.     Outcome: Ongoing, Progressing

## 2019-12-30 NOTE — PT/OT/SLP PROGRESS
"Occupational Therapy   Treatment    Name: Elisha Young  MRN: [id number]  Admitting Diagnosis:  Closed fracture of ramus of right pubis       Recommendations:     Discharge Recommendations: nursing facility, skilled  Discharge Equipment Recommendations:  none  Barriers to discharge:  (Patient requires skilled assistance at this time)    Assessment:     Elisha Young is a 89 y.o. female with a medical diagnosis of Closed fracture of ramus of right pubis. She presents with the following performance deficits affecting function: weakness, impaired endurance, impaired self care skills, impaired functional mobilty, gait instability, pain, decreased lower extremity function. Patient presented with improved functional mobility, ADL performance, and decreased c/o pain compared to evaluation. Patient completed sit > stand and SPT to chair using RW with min assist. At this time, patient will continue to benefit from acute skilled therapy intervention to address deficits/underlying impairments and progress towards prior level of function. After discharge, patient would benefit from continued skilled therapy intervention at SNF to progress more towards independence in ADLs and functional mobility before going home.    Rehab Prognosis:  Good; patient would benefit from acute skilled OT services to address these deficits and reach maximum level of function.       Plan:     Patient to be seen 4 x/week to address the above listed problems via self-care/home management, therapeutic activities, therapeutic exercises  · Plan of Care Expires: 01/25/20  · Plan of Care Reviewed with: patient, family    Subjective     Patient stated "I feel ok as long as I don't move".    Pain/Comfort:  · Pain Rating 1: (Patient did not c/o pain during session.)  · Pain Addressed 1: Pre-medicate for activity    Objective:     Communicated with: RN prior to session. Patient found HOB elevated with telemetry, [employer], and niece present upon OT entry to " room. PT present for co-tx as appropriate for patient.    General Precautions: Standard, fall   Orthopedic Precautions:RLE weight bearing as tolerated   Braces: N/A     Occupational Performance:     Bed Mobility:    · Patient completed Scooting/Bridging with stand by assistance  · Patient completed Supine to Sit with minimum assistance   · Cues provided for sequencing of steps and hand placement on bed rail    Functional Mobility/Transfers:  · Patient completed Sit <> Stand Transfer with minimum assistance  with rolling walker   · Patient completed Bed > Chair Transfer using Stand Pivot technique with minimum assistance with rolling walker  · Patient sat EOB with supervision for static sitting balance    Activities of Daily Living:  · Grooming: supervision sitting EOB to wash face  · Lower Body Dressing: stand by assistance doffing B socks and donning L sock; mod assit to kt R sock    AMPA 6 Click ADL: 17    Treatment & Education:  --Therapist provided facilitation and instruction of proper body mechanics, energy conservation, and fall prevention strategies during tasks listed above.  --Instructed patient to sit in bedside chair daily to increase OOB/activity tolerance.   --Educated patient on OT POC and answered all questions within OT scope of practice  --Whiteboard updated    Patient left up in chair with all lines intact, call button in reach and niece presentEducation:      GOALS:   Multidisciplinary Problems     Occupational Therapy Goals        Problem: Occupational Therapy Goal    Goal Priority Disciplines Outcome Interventions   Occupational Therapy Goal     OT, PT/OT Ongoing, Progressing    Description:  Goals to be met by: 1/10/19     Patient will increase functional independence with ADLs by performing:    LE Dressing with Minimal Assistance using AD   Grooming while standing with Contact Guard Assistance.  Toileting from bedside commode with Minimal Assistance for hygiene and clothing management.    Toilet transfer to bedside commode with Minimal Assistance.                      Time Tracking:     OT Date of Treatment: 12/30/19  OT Start Time: 1226  OT Stop Time: 1251  OT Total Time (min): 25 min    Billable Minutes:Self Care/Home Management 25    Saadia Beckham OT  12/30/2019

## 2019-12-30 NOTE — PROGRESS NOTES
Progress Note  Hospital Medicine    Admit Date: 12/26/2019  Length of Stay:  LOS: 4 days     SUBJECTIVE:         Follow-up For:  Closed fracture of ramus of right pubis    HPI/Interval history (See H&P for complete P,F,SHx) :     Elisha Young is a 89 y.o. female with a PMH of HTN, HLD, Diastolic dysfunction , sinus arrhythmia  who presented to the ED on 12/26/2019 diagnosed with  Hip Pain (Right hip pain s/p falll on Tuesday night. Pt having difficulty with ambulation. Abrasion noted to right upper arm. )  AAOX 3, accompanied by son and grand daughter . mentions that fell 2 days ago on her way out of bathroom  She states that she tripped and fell, striking her right forearm on a door and her right hip on a wood floor. denies palpitations, lightheadedness prior to fall. denies head trauma or loss of consciousness.  Since then, able to get herself her up to the chair and informed her son. she has been able to ambulate with discomfort, refused to come to ER due to madelin.  She reports an associated bruise to the right hip area.  Upon striking the door with her right arm, she sustained a skin tear which has been actively bleeding since the fall.  Her family has been bandaging this with some relief of her bleeding.  while in the ER , upon placing the patient on a cardiac monitor, it is noted with tachardia -  atrial fibrillation with rapid ventricular response? at 140s improved to 100s after IV Metoprolol 5mg x 1.  she states that she has been told in the past that she has some irregular heartbeat.  However, she is not currently being treated for it, prior cardiologist offered her nuclear stress test but patient refused it.  She denies any numbness, weakness, dizziness, chest pain, shortness of breath, or palpitations.  She denies any other complaints.     lives alone in [facility]. ADL/IADL independent at baseline      12/27/2019  Status post orthopedic surgery evaluation - WBAT to RLE. No orthopaedic surgical  intervention at this time. The fractures are very likely amenable to non-operative treatment, particularly considering the patients advanced age. \ patient will likely benefit from placement in IP Rehab vs. SNF to work with PT, however at this time, the patient is adamantly refusing any such arrangement and wishes to go home following hospital workup.   12/28/19  Hb at 8.2. hypokalemia replaced. Stood with therapy yesterday. Attempted to ambulate but pain was quite significant. Will need post mobilization films after ambulating. Will fu in ortho clinic in 2 weeks.started on iron and oxycodone   12/29/19  Bladder scan showed 520 mL in bladder. Straight cath completed 450 mL  12/30/2019 fecal occult blood test negative hemoglobin stable at 8.2          Review of Systems:   Pain scale: Constitutional: Positive for activity change. Negative for appetite change, chills, fatigue, fever and unexpected weight change.   HENT: Negative for congestion, ear discharge, ear pain, facial swelling and sinus pain.    Eyes: Negative for pain, discharge, redness and itching.   Respiratory: Negative for cough, choking, chest tightness, shortness of breath and wheezing.    Cardiovascular: Negative for chest pain, palpitations and leg swelling.   Gastrointestinal: Negative for abdominal distention, abdominal pain, anal bleeding, blood in stool, constipation, diarrhea, nausea and vomiting.   Endocrine: Negative for cold intolerance.   Genitourinary: Positive for pelvic pain. Negative for dysuria, flank pain, frequency and hematuria.   Musculoskeletal: Positive for arthralgias (right pelvic pain with movement and ambulation) and gait problem (since fall -). Negative for back pain, joint swelling, neck pain and neck stiffness.   Neurological: Negative for dizziness, syncope, speech difficulty, weakness, light-headedness and headaches.   Hematological: Negative for adenopathy.   Psychiatric/Behavioral: Negative for agitation, confusion,  dysphoric mood and suicidal ideas.     OBJECTIVE:     Vital Signs Range (Last 24H):  Temp:  [97.8 °F (36.6 °C)-98.9 °F (37.2 °C)]   Pulse:  []   Resp:  [16-18]   BP: (113-170)/(54-72)   SpO2:  [89 %-98 %]     Physical Exam:  Constitutional: She is oriented to person, place, and time. She appears well-developed and well-nourished.   thin built    HENT:   Head: Normocephalic and atraumatic.   Mouth/Throat: Oropharynx is clear and moist. No oropharyngeal exudate.   Eyes: Pupils are equal, round, and reactive to light. Conjunctivae and EOM are normal. Right eye exhibits no discharge. Left eye exhibits no discharge.   diminished vision left eye   Neck: Normal range of motion. Neck supple. No JVD present. No tracheal deviation present. No thyromegaly present.   Cardiovascular: Normal rate, regular rhythm and normal heart sounds. Exam reveals no gallop and no friction rub.   No murmur heard.  Pulmonary/Chest: Effort normal and breath sounds normal. No stridor. No respiratory distress. She has no wheezes. She has no rales.   Abdominal: Soft. Bowel sounds are normal. She exhibits no distension and no mass. There is no tenderness. There is no guarding.   Musculoskeletal: Normal range of motion. She exhibits no edema.   diminished ROM  right hip - attributes to pain in right pelvis    Lymphadenopathy:     She has no cervical adenopathy.   Neurological: She is oriented to person, place, and time. No cranial nerve deficit.   Skin: Skin is warm and dry.   right hip and gluteal bruise   Psychiatric: She has a normal mood and affect.   Nursing note and vitals reviewed.       Medications:  Medication list was reviewed and changes noted under Assessment/Plan.      Current Facility-Administered Medications:     acetaminophen tablet 1,000 mg, 1,000 mg, Oral, TID, Porter Starr PA-C, 1,000 mg at 12/29/19 2054    acetaminophen tablet 650 mg, 650 mg, Oral, Q6H PRN, Larry Boateng MD, 650 mg at 12/27/19 1331     albuterol-ipratropium 2.5 mg-0.5 mg/3 mL nebulizer solution 3 mL, 3 mL, Nebulization, Q4H PRN, Porter Starr PA-C    bisacodyl suppository 10 mg, 10 mg, Rectal, Daily PRN, Porter Starr PA-C    dextrose 10% (D10W) Bolus, 12.5 g, Intravenous, PRN, Larry Boateng MD    dextrose 10% (D10W) Bolus, 25 g, Intravenous, PRN, Larry Boateng MD    ferrous sulfate EC tablet 325 mg, 325 mg, Oral, Daily, Larry Boateng MD, 325 mg at 12/29/19 0816    glucagon (human recombinant) injection 1 mg, 1 mg, Intramuscular, PRN, Larry Boateng MD    glucose chewable tablet 16 g, 16 g, Oral, PRN, Larry Boateng MD    glucose chewable tablet 24 g, 24 g, Oral, PRN, Larry Boateng MD    ibuprofen tablet 400 mg, 400 mg, Oral, Q6H PRN, Porter Starr PA-C, 400 mg at 12/27/19 2110    melatonin tablet 6 mg, 6 mg, Oral, Nightly PRN, Porter Starr PA-C    ondansetron injection 4 mg, 4 mg, Intravenous, Q8H PRN, Larry Boateng MD    oxyCODONE 5 mg/5 mL solution 5 mg, 5 mg, Oral, Q12H PRN, Larry Boateng MD, 5 mg at 12/29/19 1112    pantoprazole EC tablet 40 mg, 40 mg, Oral, Daily, Porter Starr PA-C, 40 mg at 12/29/19 0816    polyethylene glycol packet 17 g, 17 g, Oral, Daily, Porter Starr PA-C, 17 g at 12/29/19 0817    promethazine (PHENERGAN) 6.25 mg in dextrose 5 % 50 mL IVPB, 6.25 mg, Intravenous, Q6H PRN, Porter Starr PA-C    senna-docusate 8.6-50 mg per tablet 2 tablet, 2 tablet, Oral, Daily, Larry Boateng MD, 2 tablet at 12/29/19 0813    sodium chloride 0.9% flush 10 mL, 10 mL, Intravenous, PRN, Kenji Hill MD    sodium chloride 0.9% flush 10 mL, 10 mL, Intravenous, PRN, Larry Boateng MD    acetaminophen, albuterol-ipratropium, bisacodyl, Dextrose 10% Bolus, Dextrose 10% Bolus, glucagon (human recombinant), glucose, glucose, ibuprofen, melatonin, ondansetron, oxyCODONE, promethazine (PHENERGAN) IVPB, sodium chloride 0.9%, sodium chloride 0.9%    Laboratory/Diagnostic  Data:  Reviewed and noted in plan where applicable- Please see chart for full lab data.    Recent Labs   Lab 12/29/19  0822 12/29/19  1545 12/30/19  0329   WBC 6.69 7.79 6.24   HGB 8.6* 8.4* 8.1*   HCT 26.1* 26.3* 25.2*    262 253       Recent Labs   Lab 12/28/19  0510 12/29/19  0458 12/30/19  0329   *  130* 134*  134* 134*  134*   K 3.3*  3.3* 3.9  3.9 4.0  4.0   CL 97  97 103  103 101  101   CO2 25  25 24  24 25  25   BUN 21  21 18  18 13  13   CREATININE 0.7  0.7 0.6  0.6 0.6  0.6   *  126* 118*  118* 105  105   CALCIUM 9.2  9.2 9.0  9.0 9.4  9.4   MG 2.0 2.0 1.9   PHOS 3.2 3.3 3.4       Recent Labs   Lab 12/26/19  1152  12/28/19  0510 12/29/19  0458 12/30/19  0329   ALKPHOS 109   < > 83  83 76  76 87  87   ALT 12   < > 10  10 10  10 15  15   AST 24   < > 23  23 22  22 24  24   ALBUMIN 3.7   < > 2.8*  2.8* 2.4*  2.4* 2.4*  2.4*   PROT 7.0   < > 6.2  6.2 5.9*  5.9* 5.9*  5.9*   BILITOT 0.7   < > 1.0  1.0 0.6  0.6 0.6  0.6   INR 1.0  --   --   --   --     < > = values in this interval not displayed.        Microbiology labs for the last week  Microbiology Results (last 7 days)     ** No results found for the last 168 hours. **           Imaging Results           CT Pelvis Without Contrast (Final result)  Result time 12/26/19 16:53:50    Final result by Roger Pearl Jr., MD (12/26/19 16:53:50)             Impression:      Fractures involving the right superior and inferior pubic rami and right sacrum with associated right nayeli-pelvic hematoma as detailed above.    This report was flagged in Epic as abnormal.    COMMUNICATION  This critical result was discovered/received at 1557. The critical information above was relayed directly by Kael Camp MD by telephone to MD Sergio on 12/26/2019 at 1558.    Electronically signed by resident: Kael Camp MD  Date:    12/26/2019  Time:    15:30    Electronically signed by: Roger Pearl,  MD  Date:    12/26/2019  Time:    16:53           Narrative:    EXAMINATION:  CT PELVIS WITHOUT CONTRAST    CLINICAL HISTORY:  Pelvic fracture, known or suspected;    TECHNIQUE:  Low dose axial images, sagittal and coronal reformations were obtained from the iliac crests to the pubic symphysis without the administration of intravenous contrast.    COMPARISON:  Hip radiograph 12/26/2019    FINDINGS:  Visualized loops of small large bowel reveal no evidence of obstruction or inflammation.  There scattered colonic diverticula.  No pelvic ascites, intraperitoneal free air, or abdominopelvic lymphadenopathy identified.    The urinary bladder is grossly unremarkable. Operative change of hysterectomy.    Visualized abdominal aorta reveals moderate calcific atherosclerosis extending into the branch vessels.    There is a comminuted slightly displaced fracture involving the right superior pubic ramus.  There is a mildly displaced fracture involving the medial aspect of the right inferior pubic ramus (sagittal image 102 and coronal image 78.)  An additional nondisplaced fracture is noted in the right sacral ala near the sacroiliac joint (axial series 2, image 158 and coronal image 97) which extends vertically involving nearly the entire craniocaudal dimension of the right nayeli sacrum.  There is no disruption of the sacroiliac joints or pubic symphysis.  There is small volume pelvic free fluid layering along the right lateral aspect of the bladder, in the presacral soft tissues, and extending along the right iliacus muscle belly likely hematoma.  Moderate joint space narrowing of the iliofemoral joints bilaterally.  Advanced degenerative change of the distal lumbar spine.    The extra-pelvic soft tissues reveal mild subcutaneous edema, and inflammatory/contusion fat stranding overlying the right greater trochanter.                             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  No fracture dislocation bone destruction seen.      Electronically signed by: Javier Zamora MD  Date:    12/26/2019  Time:    13:18                           X-Ray Hip 2 View Right (Final result)  Result time 12/26/19 13:18:18    Final result by Javier Zamora III, MD (12/26/19 13:18:18)             Narrative:    EXAMINATION:  XR HIP 2 VIEW RIGHT    CLINICAL HISTORY:  fall;    FINDINGS:  Two views right: There are possible fractures of the right superior and inferior pubic ramus.  There is baseline DJD.  No femur fracture seen.  CT could be helpful.  There could be an acetabular fracture as well.      Electronically signed by: Javier Zamora MD  Date:    12/26/2019  Time:    13:18                           X-Ray Chest AP Portable (Final result)  Result time 12/26/19 13:07:03    Final result by Perla Rutledge MD (12/26/19 13:07:03)             Impression:      No source for chest pain established on this study.      Electronically signed by: Perla Rutledge MD  Date:    12/26/2019  Time:    13:07           Narrative:    EXAMINATION:  XR CHEST AP PORTABLE    CLINICAL HISTORY:  Chest Pain;    TECHNIQUE:  Single frontal view of the chest was performed.    COMPARISON:  12/16/2015.    FINDINGS:  The patient has thoracic dextrocurvature.  Allowing for this, mediastinal structures are midline.  Descending thoracic aorta is tortuous and atherosclerotic.  There is also abundant calcific atherosclerosis in the proximal right common carotid artery.    Allowing for calcified costochondral cartilage I detect no acute intrathoracic disease.  The source of the patient's chest pain is not established on this study.    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    I detect no pneumothorax, pneumomediastinum or pneumoperitoneum.    Advanced degenerative changes are present at the shoulders in this 89-year-old woman.                              Estimated body mass index is 21.81 kg/m² as calculated from the following:    Height as of this encounter: 5' 2" (1.575 m).    Weight as of this encounter: 54.1 kg (119 lb 4.3 oz).    I & O (Last 24H):    Intake/Output Summary (Last 24 hours) at 12/30/2019 0631  Last data filed at 12/30/2019 0500  Gross per 24 hour   Intake 780 ml   Output 1275 ml   Net -495 ml       Body mass index is 21.81 kg/m².    Estimated Creatinine Clearance: 47.3 mL/min (based on SCr of 0.6 mg/dL).    ASSESSMENT/PLAN:     Active Problems:    Active Diagnoses:     Diagnosis Date Noted POA    PRINCIPAL PROBLEM:  Closed fracture of ramus of right pubis [S32.591A]. mentions that fell 2 days ago on her way out of bathroom  She states that she tripped and fell, striking her right forearm on a door and her right hip on a wood floor. denies palpitations, lightheadedness prior to fall. denies head trauma or loss of consciousness.  X ray right hip - possible fractures of the right superior and inferior pubic ramus   CT pelvis without contrast - Fractures involving the right superior and inferior pubic rami and right sacrum with associated right nayeli-pelvic hematoma as detailed above. orthopedic surgery consulted   12/27/2019  Status post orthopedic surgery evaluation - WBAT to RLE. No orthopaedic surgical intervention at this time. The fractures are very likely amenable to non-operative treatment, particularly considering the patients advanced age. \ patient will likely benefit from placement in IP Rehab vs. SNF to work with PT, however at this time, the patient is adamantly refusing any such arrangement and wishes to go home following hospital workup.   12/28/19  Hb at 8.2. hypokalemia replaced. Stood with therapy yesterday. Attempted to ambulate but pain " was quite significant. Will need post mobilization films after ambulating. Will fu in ortho clinic in 2 weeks. . Will fu in ortho clinic in 2 weeks.started on iron and oxycodone    12/26/2019 Unknown    New onset atrial fibrillation ?[I48.91]EKG - Sinus tachycardia with frequent Premature atrial complexes  while in the ER , upon placing the patient on a cardiac monitor, it is noted with tachardia -  atrial fibrillation with rapid ventricular response? at 140s improved to 100s after IV Metoprolol 5mg x 1.  she states that she has been told in the past that she has some irregular heartbeat.  However, she is not currently being treated for it, prior cardiologist offered her nuclear stress test but patient refused it.  continue Metoprolol as needed. discussed with cardiology. no evidence of afib per EKG in the hospital   12/26/2019 Yes    Fall [W19.XXXA]fall precautions 12/26/2019 Yes    Hyponatremia [E87.1]sodium at 133. monitor  12/30/2019 sodium at 134 12/26/2019 Unknown    Anemia [D64.9]Hb 13.3 to 10.4 overlast 3 days.blood loss from skin tear RUE vs bruise secondary to fall.monitor.hold ASA for now   01/27/2019 hemoglobin dropped to 9.2- likely from right hemipelvis hematoma monitor for stabilization  12/30/2019 fecal occult blood test negative hemoglobin stable at 8.2 12/26/2019 Unknown    Hypertension [I10]blood pressure controlled off medications 09/25/2013 Yes    Sinus arrhythmia [I49.8]as above  09/25/2013 Yes    Diastolic dysfunction [I51.89]does not seem to be decompensated. I/O  Monitor   Mild elevated troponin 0.127-0.086 denies chest complains.  EKG without ischemic changes 09/25/2013 Yes    Hypokalemia [E87.6]3.5 replaced  urinary retention - 12/29/19  Bladder scan showed 520 mL in bladder. Straight cath completed 450 mL. urinating with no issues 09/25/2013 Yes       Problems Resolved During this Admission:         Disposition- . patient agrreable for SNF    DVT prophylaxis addressed with:   Bilateral SCDs            Larry Boateng MD  Attending Staff Physician  [facility]  pager- [phone]  [facility] - [phone]

## 2019-12-30 NOTE — PLAN OF CARE
YING spoke to Mary Anne with [facility] to follow up on referral. SW informed that pt's referral will be reviewed soon. YING will be updated with decision.    Anna Brush, KELECHI  [facility]

## 2019-12-31 LAB
ALBUMIN SERPL BCP-MCNC: 2.8 G/DL (ref 3.5–5.2)
ALBUMIN SERPL BCP-MCNC: 2.8 G/DL (ref 3.5–5.2)
ALP SERPL-CCNC: 101 U/L (ref 55–135)
ALP SERPL-CCNC: 101 U/L (ref 55–135)
ALT SERPL W/O P-5'-P-CCNC: 16 U/L (ref 10–44)
ALT SERPL W/O P-5'-P-CCNC: 16 U/L (ref 10–44)
ANION GAP SERPL CALC-SCNC: 9 MMOL/L (ref 8–16)
ANION GAP SERPL CALC-SCNC: 9 MMOL/L (ref 8–16)
AST SERPL-CCNC: 25 U/L (ref 10–40)
AST SERPL-CCNC: 25 U/L (ref 10–40)
BASOPHILS # BLD AUTO: 0.03 K/UL (ref 0–0.2)
BASOPHILS NFR BLD: 0.4 % (ref 0–1.9)
BILIRUB SERPL-MCNC: 0.7 MG/DL (ref 0.1–1)
BILIRUB SERPL-MCNC: 0.7 MG/DL (ref 0.1–1)
BUN SERPL-MCNC: 9 MG/DL (ref 10–30)
BUN SERPL-MCNC: 9 MG/DL (ref 10–30)
CALCIUM SERPL-MCNC: 9.8 MG/DL (ref 8.7–10.5)
CALCIUM SERPL-MCNC: 9.8 MG/DL (ref 8.7–10.5)
CHLORIDE SERPL-SCNC: 97 MMOL/L (ref 95–110)
CHLORIDE SERPL-SCNC: 97 MMOL/L (ref 95–110)
CO2 SERPL-SCNC: 27 MMOL/L (ref 23–29)
CO2 SERPL-SCNC: 27 MMOL/L (ref 23–29)
CREAT SERPL-MCNC: 0.6 MG/DL (ref 0.5–1.4)
CREAT SERPL-MCNC: 0.6 MG/DL (ref 0.5–1.4)
DIFFERENTIAL METHOD: ABNORMAL
EOSINOPHIL # BLD AUTO: 0.4 K/UL (ref 0–0.5)
EOSINOPHIL NFR BLD: 5.5 % (ref 0–8)
ERYTHROCYTE [DISTWIDTH] IN BLOOD BY AUTOMATED COUNT: 12.7 % (ref 11.5–14.5)
EST. GFR  (AFRICAN AMERICAN): >60 ML/MIN/1.73 M^2
EST. GFR  (AFRICAN AMERICAN): >60 ML/MIN/1.73 M^2
EST. GFR  (NON AFRICAN AMERICAN): >60 ML/MIN/1.73 M^2
EST. GFR  (NON AFRICAN AMERICAN): >60 ML/MIN/1.73 M^2
GLUCOSE SERPL-MCNC: 132 MG/DL (ref 70–110)
GLUCOSE SERPL-MCNC: 132 MG/DL (ref 70–110)
HCT VFR BLD AUTO: 27.1 % (ref 37–48.5)
HGB BLD-MCNC: 8.8 G/DL (ref 12–16)
IMM GRANULOCYTES # BLD AUTO: 0.03 K/UL (ref 0–0.04)
IMM GRANULOCYTES NFR BLD AUTO: 0.4 % (ref 0–0.5)
LYMPHOCYTES # BLD AUTO: 1.4 K/UL (ref 1–4.8)
LYMPHOCYTES NFR BLD: 18.4 % (ref 18–48)
MAGNESIUM SERPL-MCNC: 1.9 MG/DL (ref 1.6–2.6)
MCH RBC QN AUTO: 32.2 PG (ref 27–31)
MCHC RBC AUTO-ENTMCNC: 32.5 G/DL (ref 32–36)
MCV RBC AUTO: 99 FL (ref 82–98)
MONOCYTES # BLD AUTO: 0.7 K/UL (ref 0.3–1)
MONOCYTES NFR BLD: 9.3 % (ref 4–15)
NEUTROPHILS # BLD AUTO: 4.9 K/UL (ref 1.8–7.7)
NEUTROPHILS NFR BLD: 66 % (ref 38–73)
NRBC BLD-RTO: 0 /100 WBC
PHOSPHATE SERPL-MCNC: 2.9 MG/DL (ref 2.7–4.5)
PLATELET # BLD AUTO: 316 K/UL (ref 150–350)
PMV BLD AUTO: 9.8 FL (ref 9.2–12.9)
POTASSIUM SERPL-SCNC: 3.8 MMOL/L (ref 3.5–5.1)
POTASSIUM SERPL-SCNC: 3.8 MMOL/L (ref 3.5–5.1)
PROT SERPL-MCNC: 6.8 G/DL (ref 6–8.4)
PROT SERPL-MCNC: 6.8 G/DL (ref 6–8.4)
RBC # BLD AUTO: 2.73 M/UL (ref 4–5.4)
SODIUM SERPL-SCNC: 133 MMOL/L (ref 136–145)
SODIUM SERPL-SCNC: 133 MMOL/L (ref 136–145)
WBC # BLD AUTO: 7.41 K/UL (ref 3.9–12.7)

## 2019-12-31 PROCEDURE — 20600001 HC STEP DOWN PRIVATE ROOM

## 2019-12-31 PROCEDURE — 97535 SELF CARE MNGMENT TRAINING: CPT

## 2019-12-31 PROCEDURE — 85025 COMPLETE CBC W/AUTO DIFF WBC: CPT

## 2019-12-31 PROCEDURE — 63600175 PHARM REV CODE 636 W HCPCS: Performed by: HOSPITALIST

## 2019-12-31 PROCEDURE — 80053 COMPREHEN METABOLIC PANEL: CPT

## 2019-12-31 PROCEDURE — 84100 ASSAY OF PHOSPHORUS: CPT

## 2019-12-31 PROCEDURE — 97530 THERAPEUTIC ACTIVITIES: CPT

## 2019-12-31 PROCEDURE — 99232 SBSQ HOSP IP/OBS MODERATE 35: CPT | Mod: ,,, | Performed by: INTERNAL MEDICINE

## 2019-12-31 PROCEDURE — 25000003 PHARM REV CODE 250: Performed by: HOSPITALIST

## 2019-12-31 PROCEDURE — 36415 COLL VENOUS BLD VENIPUNCTURE: CPT

## 2019-12-31 PROCEDURE — 99232 PR SUBSEQUENT HOSPITAL CARE,LEVL II: ICD-10-PCS | Mod: ,,, | Performed by: INTERNAL MEDICINE

## 2019-12-31 PROCEDURE — 25000003 PHARM REV CODE 250: Performed by: PHYSICIAN ASSISTANT

## 2019-12-31 PROCEDURE — 83735 ASSAY OF MAGNESIUM: CPT

## 2019-12-31 RX ORDER — ACETAMINOPHEN 325 MG/1
650 TABLET ORAL EVERY 6 HOURS PRN
Qty: 120 TABLET | Refills: 0 | Status: SHIPPED | OUTPATIENT
Start: 2019-12-31 | End: 2021-01-01

## 2019-12-31 RX ORDER — FERROUS SULFATE 325(65) MG
325 TABLET, DELAYED RELEASE (ENTERIC COATED) ORAL DAILY
Qty: 30 TABLET | Refills: 3 | Status: SHIPPED | OUTPATIENT
Start: 2019-12-31 | End: 2021-01-01

## 2019-12-31 RX ORDER — AMOXICILLIN 250 MG
2 CAPSULE ORAL DAILY
Qty: 30 TABLET | Refills: 0 | Status: SHIPPED | OUTPATIENT
Start: 2019-12-31 | End: 2021-01-01

## 2019-12-31 RX ORDER — OXYCODONE HCL 5 MG/5 ML
5 SOLUTION, ORAL ORAL EVERY 12 HOURS PRN
Qty: 70 ML | Refills: 0 | Status: SHIPPED | OUTPATIENT
Start: 2019-12-31 | End: 2020-01-07

## 2019-12-31 RX ADMIN — ACETAMINOPHEN 1000 MG: 500 TABLET ORAL at 02:12

## 2019-12-31 RX ADMIN — OXYCODONE HYDROCHLORIDE 5 MG: 5 SOLUTION ORAL at 09:12

## 2019-12-31 RX ADMIN — FERROUS SULFATE TAB EC 325 MG (65 MG FE EQUIVALENT) 325 MG: 325 (65 FE) TABLET DELAYED RESPONSE at 09:12

## 2019-12-31 RX ADMIN — ACETAMINOPHEN 1000 MG: 500 TABLET ORAL at 09:12

## 2019-12-31 RX ADMIN — OXYCODONE HYDROCHLORIDE 5 MG: 5 SOLUTION ORAL at 10:12

## 2019-12-31 RX ADMIN — PANTOPRAZOLE SODIUM 40 MG: 40 TABLET, DELAYED RELEASE ORAL at 09:12

## 2019-12-31 NOTE — PLAN OF CARE
YING assigned to case today 12/31/2019. YING will assist team with DC needs. SW in communication with CM.    Referral is under review by Luis Myers. YING sent updated labs as requested.      12/31/19 1232   Post-Acute Status   Post-Acute Authorization Placement   Post-Acute Placement Status Pending Post-Acute Clinical Review     Jodie Wilder, LCSW [facility]  [id number]

## 2019-12-31 NOTE — PLAN OF CARE
Goals reviewed and remain appropriate. Pt progressing towards goals.    Corie Morales, PT, DPT   2019  [phone]    Problem: Physical Therapy Goal  Goal: Physical Therapy Goal  Description  Goals to be met by: 1/10/2020     Patient will increase functional independence with mobility by performin. Supine to sit with MInimal Assistance - met   1a. Supine to sit with SBA   2. Sit to stand transfer with Minimal Assistance - met   2a. Sit to stand transfer with SBA using RW  3. Bed to chair transfer with Minimal Assistance using Rolling Walker - met   3a. Bed to chair transfer with SBA using RW  4. Gait  x 25 feet with Minimal Assistance using Rolling Walker. - met   4a. Gait x150 ft with SBA using RW  5. Lower extremity exercise program x15 reps, with supervision, in order to increase LE strength and (I) with functional mobility.         Outcome: Ongoing, Progressing

## 2019-12-31 NOTE — PT/OT/SLP PROGRESS
"Physical Therapy Treatment    Patient Name:  Elisha Young   MRN:  [id number]    Recommendations:     Discharge Recommendations:  nursing facility, skilled   Discharge Equipment Recommendations: none   Barriers to discharge: Inaccessible home and Decreased caregiver support (5 YULY; requires increased assist at this time)    Assessment:     Elisha Young is a 89 y.o. female admitted with a medical diagnosis of Closed fracture of ramus of right pubis.  She presents with the following impairments/functional limitations:  weakness, impaired functional mobilty, impaired endurance, gait instability, impaired balance, impaired self care skills, decreased lower extremity function. Pt progressing functional mobility, as she was able to ambulate short distance this date. However, she remains limited 2* impaired endurance, weakness, and altered gait mechanics due to fracture. Pt would continue to benefit from skilled acute PT in order to address current deficits and progress functional mobility.     Rehab Prognosis: Good; patient would benefit from acute skilled PT services to address these deficits and reach maximum level of function.    Recent Surgery: * No surgery found *      Plan:     During this hospitalization, patient to be seen 4 x/week to address the identified rehab impairments via gait training, therapeutic activities, therapeutic exercises, neuromuscular re-education and progress toward the following goals:    · Plan of Care Expires:  01/25/20    Subjective     Chief Complaint: none noted   Patient/Family Comments/goals: "I know I can't go home by myself," pt stating agreement to SNF upon d/c.   Pain/Comfort:  · Pain Rating 1: 0/10      Objective:     Communicated with RN prior to session.  Patient found up in chair with telemetry, Pure[employer] upon PT entry to room.     General Precautions: Standard, fall   Orthopedic Precautions:RLE weight bearing as tolerated   Braces: N/A     Functional Mobility:  · Transfers:   "   · Sit to Stand:  minimum assistance from bedside chair with no AD x1 rep and rolling walker x1 rep  · Cues for hand placement and transfer technique with RW   · Gait: ~25 ft. with RW and CGA  · demo'd step-to pattern with decreased WB through RLE, decreased weight-shift to R, decreased step length, and decreased judson  · Heavily relying on RW to assist with off-loading LE       AM-PAC 6 CLICK MOBILITY  Turning over in bed (including adjusting bedclothes, sheets and blankets)?: 3  Sitting down on and standing up from a chair with arms (e.g., wheelchair, bedside commode, etc.): 3  Moving from lying on back to sitting on the side of the bed?: 3  Moving to and from a bed to a chair (including a wheelchair)?: 3  Need to walk in hospital room?: 3  Climbing 3-5 steps with a railing?: 1  Basic Mobility Total Score: 16       Therapeutic Activities and Exercises:   Completed functional mobility as described above. Discussed d/c recs with pt and family with pt agreeable to d/c to SNF. RW delivered to room for use during hospital admission. Pt instructed that she must have staff assist for transfers and gait. Pt v/u.     Patient left up in chair with all lines intact, call button in reach and pt's family present..    GOALS:   Multidisciplinary Problems     Physical Therapy Goals        Problem: Physical Therapy Goal    Goal Priority Disciplines Outcome Goal Variances Interventions   Physical Therapy Goal     PT, PT/OT Ongoing, Progressing     Description:  Goals to be met by: 1/10/2020     Patient will increase functional independence with mobility by performin. Supine to sit with MInimal Assistance - met   1a. Supine to sit with SBA   2. Sit to stand transfer with Minimal Assistance - met   2a. Sit to stand transfer with SBA using RW  3. Bed to chair transfer with Minimal Assistance using Rolling Walker - met   3a. Bed to chair transfer with SBA using RW  4. Gait  x 25 feet with Minimal Assistance  using Rolling Walker. - met 12/31  4a. Gait x150 ft with SBA using RW  5. Lower extremity exercise program x15 reps, with supervision, in order to increase LE strength and (I) with functional mobility.                          Time Tracking:     PT Received On: 12/31/19  PT Start Time: 1049     PT Stop Time: 1105  PT Total Time (min): 16 min     Billable Minutes: Therapeutic Activity 16    Treatment Type: Treatment  PT/PTA: PT     PTA Visit Number: 0     Corie Morales PT, DPT   12/31/2019  [phone]

## 2019-12-31 NOTE — PLAN OF CARE
Goals reviewed and revised/upgraded as appropriate for patient.  Saadia Beckham OTR/L  Pager #: [phone]  2019    Problem: Occupational Therapy Goal  Goal: Occupational Therapy Goal  Description  Goals to be met by: 1/10/19     Patient will increase functional independence with ADLs by performin. LE Dressing with Minimal Assistance using AD    REVISED: LE Dressing with SBA.  2. Grooming while standing with Contact Guard Assistance.   REVISED: Grooming while standing with Supervision.  3. Toileting from bedside commode with Minimal Assistance for hygiene and clothing management.    REVISED: Toileting from toilet with SBA for clothing management.  4. Toilet transfer to bedside commode with Minimal Assistance.   REVISED: Toilet transfer to toilet with supervision.      Outcome: Ongoing, Progressing

## 2019-12-31 NOTE — PT/OT/SLP PROGRESS
"Occupational Therapy   Treatment    Name: Elisha Young  MRN: [id number]  Admitting Diagnosis:  Closed fracture of ramus of right pubis       Recommendations:     Discharge Recommendations: nursing facility, skilled  Discharge Equipment Recommendations:  none  Barriers to discharge:  (Patient requires skilled assistance at this time)    Assessment:     Elisha Young is a 89 y.o. female with a medical diagnosis of Closed fracture of ramus of right pubis. She presents with R pelvic fx and is WBAT on R LE. Performance deficits affecting function are weakness, impaired endurance, impaired self care skills, impaired functional mobilty, gait instability, impaired balance, pain, decreased lower extremity function. Patient tolerated session well and was able to ambulate bedside chair <> toilet using RW with CGA, with cues for safety and RW management. At this time, patient will continue to benefit from acute skilled therapy intervention to address deficits/underlying impairments and progress towards prior level of function. After discharge, patient would benefit from continued skilled therapy intervention at SNF to progress more towards independence in ADLs and functional mobility before going home.    Rehab Prognosis:  Good; patient would benefit from acute skilled OT services to address these deficits and reach maximum level of function.       Plan:     Patient to be seen 4 x/week to address the above listed problems via self-care/home management, therapeutic activities, therapeutic exercises  · Plan of Care Expires: 01/25/20  · Plan of Care Reviewed with: patient, family    Subjective     When asked about pain during ambulation, patient stated "It's nothing I can't live with".    Pain/Comfort:  · Pain Rating 1: (Patient did not rate pain during session.)  · Pain Addressed 1: Pre-medicate for activity    Objective:     Communicated with: RN prior to session. Patient found up in chair with telemetry, Radha and family " present upon OT entry to room.    General Precautions: Standard, fall   Orthopedic Precautions:RLE weight bearing as tolerated   Braces: N/A     Occupational Performance:     Bed Mobility:    · Not assessed; patient up in chair at start of session and returned to chair at end of session    Functional Mobility/Transfers:  · Patient completed Sit <> Stand Transfer with from bedside chair minimum assistance with rolling walker   · Cues for hand placement  · Patient completed Toilet Transfer Step Transfer technique with minimum assistance with rolling walker  · Cues for placement of B LEs against commode before sitting  · Functional Mobility: Patient ambulated ~10 feet x2 (bedside chair <> bathroom) using RW with CGA.  · Cues provided for pacing/slowing down, RW positioning, foot placement, and sequencing of steps    Activities of Daily Living:  · Grooming: contact guard assistance standing at sink with RW to wash hands  · Toileting: stand by assistance for hygiene and moderate assistance for brief management    AMPAC 6 Click ADL: 18    Treatment & Education:   Therapist provided facilitation and instruction of proper body mechanics, energy conservation, and fall prevention strategies during tasks listed above.   Instructed patient to sit in bedside chair daily to increase OOB/activity tolerance.   Instructed patient to use call light to have nursing staff assist with transfers.    Educated patient on OT POC and answered all questions within OT scope of practice.   Whiteboard updated     Patient left up in chair with all lines intact, call button in reach and staff presentEducation:      GOALS:   Multidisciplinary Problems     Occupational Therapy Goals        Problem: Occupational Therapy Goal    Goal Priority Disciplines Outcome Interventions   Occupational Therapy Goal     OT, PT/OT Ongoing, Progressing    Description:  Goals to be met by: 1/10/19     Patient will increase functional independence with ADLs by  performin. LE Dressing with Minimal Assistance using AD    REVISED: LE Dressing with SBA.  2. Grooming while standing with Contact Guard Assistance.   REVISED: Grooming while standing with Supervision.  3. Toileting from bedside commode with Minimal Assistance for hygiene and clothing management.    REVISED: Toileting from toilet with SBA for clothing management.  4. Toilet transfer to bedside commode with Minimal Assistance.   REVISED: Toilet transfer to toilet with supervision.                       Time Tracking:     OT Date of Treatment: 19  OT Start Time: 1427  OT Stop Time: 1451  OT Total Time (min): 24 min    Billable Minutes:Self Care/Home Management 12  Therapeutic Activity 12    Saadia Beckham OT  2019

## 2019-12-31 NOTE — PLAN OF CARE
Pt free of falls and injury during shift. POC reviewed with pt VS stable and AAox4. sinus arrhythmia On telemetry. R upper arm dressing, CDI. SCDs in place. Frequent weight shift provided. PW in place with no complaints. possible d/c to snf pending approval. No IV access, pt family refused, MD Ro aware. No acute events noted at this time. No complaints. Educated pt why she is at risk for falls and to use call light for assistance ambulating. Yellow non-slip socks on pt. Bed low and locked, call light with in reach. Will continue to monitor.

## 2019-12-31 NOTE — PROGRESS NOTES
MD Ro made pt lost IV access today and  pt family refused for a new one to placed. Pt a full code, no IV meds, and suppose to be discharge tomorrow. MD stated that's okay. Will continue to monitor

## 2019-12-31 NOTE — PROGRESS NOTES
Progress Note  Hospital Medicine    Admit Date: 12/26/2019  Length of Stay:  LOS: 5 days     SUBJECTIVE:         Follow-up For:  Closed fracture of ramus of right pubis    HPI/Interval history (See H&P for complete P,F,SHx) :     Elisha Young is a 89 y.o. female with a PMH of HTN, HLD, Diastolic dysfunction , sinus arrhythmia  who presented to the ED on 12/26/2019 diagnosed with  Hip Pain (Right hip pain s/p falll on Tuesday night. Pt having difficulty with ambulation. Abrasion noted to right upper arm. )  AAOX 3, accompanied by son and grand daughter . mentions that fell 2 days ago on her way out of bathroom  She states that she tripped and fell, striking her right forearm on a door and her right hip on a wood floor. denies palpitations, lightheadedness prior to fall. denies head trauma or loss of consciousness.  Since then, able to get herself her up to the chair and informed her son. she has been able to ambulate with discomfort, refused to come to ER due to madelin.  She reports an associated bruise to the right hip area.  Upon striking the door with her right arm, she sustained a skin tear which has been actively bleeding since the fall.  Her family has been bandaging this with some relief of her bleeding.  while in the ER , upon placing the patient on a cardiac monitor, it is noted with tachardia -  atrial fibrillation with rapid ventricular response? at 140s improved to 100s after IV Metoprolol 5mg x 1.  she states that she has been told in the past that she has some irregular heartbeat.  However, she is not currently being treated for it, prior cardiologist offered her nuclear stress test but patient refused it.  She denies any numbness, weakness, dizziness, chest pain, shortness of breath, or palpitations.  She denies any other complaints.     lives alone in [facility]. ADL/IADL independent at baseline      12/27/2019  Status post orthopedic surgery evaluation - WBAT to RLE. No orthopaedic surgical  intervention at this time. The fractures are very likely amenable to non-operative treatment, particularly considering the patients advanced age. \ patient will likely benefit from placement in IP Rehab vs. SNF to work with PT, however at this time, the patient is adamantly refusing any such arrangement and wishes to go home following hospital workup.   12/28/19  Hb at 8.2. hypokalemia replaced. Stood with therapy yesterday. Attempted to ambulate but pain was quite significant. Will need post mobilization films after ambulating. Will fu in ortho clinic in 2 weeks.started on iron and oxycodone   12/29/19  Bladder scan showed 520 mL in bladder. Straight cath completed 450 mL  12/30/2019 fecal occult blood test negative hemoglobin stable at 8.2  12/31 Doing well. Ready for SNF          Review of Systems:   Pain scale: Constitutional: Positive for activity change. Negative for appetite change, chills, fatigue, fever and unexpected weight change.   HENT: Negative for congestion, ear discharge, ear pain, facial swelling and sinus pain.    Eyes: Negative for pain, discharge, redness and itching.   Respiratory: Negative for cough, choking, chest tightness, shortness of breath and wheezing.    Cardiovascular: Negative for chest pain, palpitations and leg swelling.   Gastrointestinal: Negative for abdominal distention, abdominal pain, anal bleeding, blood in stool, constipation, diarrhea, nausea and vomiting.   Endocrine: Negative for cold intolerance.   Genitourinary: Positive for pelvic pain. Negative for dysuria, flank pain, frequency and hematuria.   Musculoskeletal: Positive for arthralgias (right pelvic pain with movement and ambulation) and gait problem (since fall -). Negative for back pain, joint swelling, neck pain and neck stiffness.   Neurological: Negative for dizziness, syncope, speech difficulty, weakness, light-headedness and headaches.   Hematological: Negative for adenopathy.   Psychiatric/Behavioral:  Negative for agitation, confusion, dysphoric mood and suicidal ideas.     OBJECTIVE:     Vital Signs Range (Last 24H):  Temp:  [97.4 °F (36.3 °C)-99.3 °F (37.4 °C)]   Pulse:  []   Resp:  [16-20]   BP: (130-174)/(60-84)   SpO2:  [88 %-96 %]     Physical Exam:  Constitutional: She is oriented to person, place, and time. She appears well-developed and well-nourished.   thin built    HENT:   Head: Normocephalic and atraumatic.   Mouth/Throat: Oropharynx is clear and moist. No oropharyngeal exudate.   Eyes: Pupils are equal, round, and reactive to light. Conjunctivae and EOM are normal. Right eye exhibits no discharge. Left eye exhibits no discharge.   diminished vision left eye   Neck: Normal range of motion. Neck supple. No JVD present. No tracheal deviation present. No thyromegaly present.   Cardiovascular: Normal rate, regular rhythm and normal heart sounds. Exam reveals no gallop and no friction rub.   No murmur heard.  Pulmonary/Chest: Effort normal and breath sounds normal. No stridor. No respiratory distress. She has no wheezes. She has no rales.   Abdominal: Soft. Bowel sounds are normal. She exhibits no distension and no mass. There is no tenderness. There is no guarding.   Musculoskeletal: Normal range of motion. She exhibits no edema.   diminished ROM  right hip - attributes to pain in right pelvis    Lymphadenopathy:     She has no cervical adenopathy.   Neurological: She is oriented to person, place, and time. No cranial nerve deficit.   Skin: Skin is warm and dry.   right hip and gluteal bruise   Psychiatric: She has a normal mood and affect.   Nursing note and vitals reviewed.       Medications:  Medication list was reviewed and changes noted under Assessment/Plan.      Current Facility-Administered Medications:     acetaminophen tablet 1,000 mg, 1,000 mg, Oral, TID, Porter Starr PA-C, 1,000 mg at 12/31/19 0913    acetaminophen tablet 650 mg, 650 mg, Oral, Q6H PRN, Larry Boateng MD, 650 mg  at 12/27/19 1331    albuterol-ipratropium 2.5 mg-0.5 mg/3 mL nebulizer solution 3 mL, 3 mL, Nebulization, Q4H PRN, Porter Starr PA-C    bisacodyl suppository 10 mg, 10 mg, Rectal, Daily PRN, Porter Starr PA-C    dextrose 10% (D10W) Bolus, 12.5 g, Intravenous, PRN, Larry Boateng MD    dextrose 10% (D10W) Bolus, 25 g, Intravenous, PRN, Larry Boateng MD    ferrous sulfate EC tablet 325 mg, 325 mg, Oral, Daily, Larry Boateng MD, 325 mg at 12/31/19 0913    glucagon (human recombinant) injection 1 mg, 1 mg, Intramuscular, PRN, Larry Boateng MD    glucose chewable tablet 16 g, 16 g, Oral, PRN, Larry Boateng MD    glucose chewable tablet 24 g, 24 g, Oral, PRN, Larry Boateng MD    ibuprofen tablet 400 mg, 400 mg, Oral, Q6H PRN, Porter Starr PA-C, 400 mg at 12/27/19 2110    melatonin tablet 6 mg, 6 mg, Oral, Nightly PRN, Porter Starr PA-C    ondansetron injection 4 mg, 4 mg, Intravenous, Q8H PRN, Larry Boateng MD    oxyCODONE 5 mg/5 mL solution 5 mg, 5 mg, Oral, Q12H PRN, Larry Boateng MD, 5 mg at 12/31/19 0927    pantoprazole EC tablet 40 mg, 40 mg, Oral, Daily, Porter Starr PA-C, 40 mg at 12/31/19 0913    polyethylene glycol packet 17 g, 17 g, Oral, Daily, Porter Starr PA-C, 17 g at 12/30/19 0850    promethazine (PHENERGAN) 6.25 mg in dextrose 5 % 50 mL IVPB, 6.25 mg, Intravenous, Q6H PRN, Porter Starr PA-C    senna-docusate 8.6-50 mg per tablet 2 tablet, 2 tablet, Oral, Daily, Larry Boateng MD, 2 tablet at 12/30/19 0849    sodium chloride 0.9% flush 10 mL, 10 mL, Intravenous, PRN, Kenji Hill MD    sodium chloride 0.9% flush 10 mL, 10 mL, Intravenous, PRN, Larry Boateng MD    acetaminophen, albuterol-ipratropium, bisacodyl, Dextrose 10% Bolus, Dextrose 10% Bolus, glucagon (human recombinant), glucose, glucose, ibuprofen, melatonin, ondansetron, oxyCODONE, promethazine (PHENERGAN) IVPB, sodium chloride 0.9%, sodium chloride  0.9%    Laboratory/Diagnostic Data:  Reviewed and noted in plan where applicable- Please see chart for full lab data.    Recent Labs   Lab 12/29/19  1545 12/30/19 0329 12/31/19  0404   WBC 7.79 6.24 7.41   HGB 8.4* 8.1* 8.8*   HCT 26.3* 25.2* 27.1*    253 316       Recent Labs   Lab 12/29/19  0458 12/30/19 0329 12/31/19  0404   *  134* 134*  134* 133*  133*   K 3.9  3.9 4.0  4.0 3.8  3.8     103 101  101 97  97   CO2 24  24 25  25 27  27   BUN 18  18 13  13 9*  9*   CREATININE 0.6  0.6 0.6  0.6 0.6  0.6   *  118* 105  105 132*  132*   CALCIUM 9.0  9.0 9.4  9.4 9.8  9.8   MG 2.0 1.9 1.9   PHOS 3.3 3.4 2.9       Recent Labs   Lab 12/26/19  1152  12/29/19  0458 12/30/19 0329 12/31/19  0404   ALKPHOS 109   < > 76  76 87  87 101  101   ALT 12   < > 10  10 15  15 16  16   AST 24   < > 22  22 24  24 25  25   ALBUMIN 3.7   < > 2.4*  2.4* 2.4*  2.4* 2.8*  2.8*   PROT 7.0   < > 5.9*  5.9* 5.9*  5.9* 6.8  6.8   BILITOT 0.7   < > 0.6  0.6 0.6  0.6 0.7  0.7   INR 1.0  --   --   --   --     < > = values in this interval not displayed.        Microbiology labs for the last week  Microbiology Results (last 7 days)     ** No results found for the last 168 hours. **           Imaging Results           CT Pelvis Without Contrast (Final result)  Result time 12/26/19 16:53:50    Final result by Roger Pearl Jr., MD (12/26/19 16:53:50)                 Impression:      Fractures involving the right superior and inferior pubic rami and right sacrum with associated right nayeli-pelvic hematoma as detailed above.    This report was flagged in Epic as abnormal.    COMMUNICATION  This critical result was discovered/received at 1557. The critical information above was relayed directly by Kael Camp MD by telephone to MD Sergio on 12/26/2019 at 1558.    Electronically signed by resident: Kael Camp MD  Date:    12/26/2019  Time:    15:30    Electronically signed  by: Roger Pearl MD  Date:    12/26/2019  Time:    16:53             Narrative:    EXAMINATION:  CT PELVIS WITHOUT CONTRAST    CLINICAL HISTORY:  Pelvic fracture, known or suspected;    TECHNIQUE:  Low dose axial images, sagittal and coronal reformations were obtained from the iliac crests to the pubic symphysis without the administration of intravenous contrast.    COMPARISON:  Hip radiograph 12/26/2019    FINDINGS:  Visualized loops of small large bowel reveal no evidence of obstruction or inflammation.  There scattered colonic diverticula.  No pelvic ascites, intraperitoneal free air, or abdominopelvic lymphadenopathy identified.    The urinary bladder is grossly unremarkable. Operative change of hysterectomy.    Visualized abdominal aorta reveals moderate calcific atherosclerosis extending into the branch vessels.    There is a comminuted slightly displaced fracture involving the right superior pubic ramus.  There is a mildly displaced fracture involving the medial aspect of the right inferior pubic ramus (sagittal image 102 and coronal image 78.)  An additional nondisplaced fracture is noted in the right sacral ala near the sacroiliac joint (axial series 2, image 158 and coronal image 97) which extends vertically involving nearly the entire craniocaudal dimension of the right nayeli sacrum.  There is no disruption of the sacroiliac joints or pubic symphysis.  There is small volume pelvic free fluid layering along the right lateral aspect of the bladder, in the presacral soft tissues, and extending along the right iliacus muscle belly likely hematoma.  Moderate joint space narrowing of the iliofemoral joints bilaterally.  Advanced degenerative change of the distal lumbar spine.    The extra-pelvic soft tissues reveal mild subcutaneous edema, and inflammatory/contusion fat stranding overlying the right greater trochanter.                               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  No fracture dislocation bone destruction seen.      Electronically signed by: Javier Zamora MD  Date:    12/26/2019  Time:    13:18                             X-Ray Hip 2 View Right (Final result)  Result time 12/26/19 13:18:18    Final result by Javier Zamora III, MD (12/26/19 13:18:18)                 Narrative:    EXAMINATION:  XR HIP 2 VIEW RIGHT    CLINICAL HISTORY:  fall;    FINDINGS:  Two views right: There are possible fractures of the right superior and inferior pubic ramus.  There is baseline DJD.  No femur fracture seen.  CT could be helpful.  There could be an acetabular fracture as well.      Electronically signed by: Javier Zamora MD  Date:    12/26/2019  Time:    13:18                             X-Ray Chest AP Portable (Final result)  Result time 12/26/19 13:07:03    Final result by Perla Rutledge MD (12/26/19 13:07:03)                 Impression:      No source for chest pain established on this study.      Electronically signed by: Perla Rutledge MD  Date:    12/26/2019  Time:    13:07             Narrative:    EXAMINATION:  XR CHEST AP PORTABLE    CLINICAL HISTORY:  Chest Pain;    TECHNIQUE:  Single frontal view of the chest was performed.    COMPARISON:  12/16/2015.    FINDINGS:  The patient has thoracic dextrocurvature.  Allowing for this, mediastinal structures are midline.  Descending thoracic aorta is tortuous and atherosclerotic.  There is also abundant calcific atherosclerosis in the proximal right common carotid artery.    Allowing for calcified costochondral cartilage I detect no acute intrathoracic disease.  The source of the patient's chest pain is not established on this study.    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    I detect no pneumothorax, pneumomediastinum or pneumoperitoneum.    Advanced degenerative changes are present at the shoulders in this 89-year-old woman.                                Estimated body mass index is 21.21 kg/m² as calculated from the following:    Height as of this encounter: 5' 2" (1.575 m).    Weight as of this encounter: 52.6 kg (115 lb 15.4 oz).    I & O (Last 24H):    Intake/Output Summary (Last 24 hours) at 12/31/2019 1004  Last data filed at 12/31/2019 0500  Gross per 24 hour   Intake 690 ml   Output 900 ml   Net -210 ml       Body mass index is 21.21 kg/m².    Estimated Creatinine Clearance: 47.3 mL/min (based on SCr of 0.6 mg/dL).    ASSESSMENT/PLAN:     Active Problems:    Active Diagnoses:     Diagnosis Date Noted POA    PRINCIPAL PROBLEM:  Closed fracture of ramus of right pubis [S32.591A]. mentions that fell 2 days ago on her way out of bathroom  She states that she tripped and fell, striking her right forearm on a door and her right hip on a wood floor. denies palpitations, lightheadedness prior to fall. denies head trauma or loss of consciousness.  X ray right hip - possible fractures of the right superior and inferior pubic ramus   CT pelvis without contrast - Fractures involving the right superior and inferior pubic rami and right sacrum with associated right nayeli-pelvic hematoma as detailed above. orthopedic surgery consulted   12/27/2019  Status post orthopedic surgery evaluation - WBAT to RLE. No orthopaedic surgical intervention at this time. The fractures are very likely amenable to non-operative treatment, particularly considering the patients advanced age. \ patient will likely benefit from placement in IP Rehab vs. SNF to work with PT, however at this time, the patient is adamantly refusing any such arrangement and wishes to go home following hospital workup.   12/28/19  Hb at 8.2. hypokalemia replaced. Stood with therapy " yesterday. Attempted to ambulate but pain was quite significant. Will need post mobilization films after ambulating. Will fu in ortho clinic in 2 weeks. . Will fu in ortho clinic in 2 weeks.started on iron and oxycodone    12/26/2019 Unknown    New onset atrial fibrillation ?[I48.91]EKG - Sinus tachycardia with frequent Premature atrial complexes  while in the ER , upon placing the patient on a cardiac monitor, it is noted with tachardia -  atrial fibrillation with rapid ventricular response? at 140s improved to 100s after IV Metoprolol 5mg x 1.  she states that she has been told in the past that she has some irregular heartbeat.  However, she is not currently being treated for it, prior cardiologist offered her nuclear stress test but patient refused it.  continue Metoprolol as needed. discussed with cardiology. no evidence of afib per EKG in the hospital   12/26/2019 Yes    Fall [W19.XXXA]fall precautions 12/26/2019 Yes    Hyponatremia [E87.1]sodium at 133. monitor  12/30/2019 sodium at 134 12/26/2019 Unknown    Anemia [D64.9]Hb 13.3 to 10.4 overlast 3 days.blood loss from skin tear RUE vs bruise secondary to fall.monitor.hold ASA for now   01/27/2019 hemoglobin dropped to 9.2- likely from right hemipelvis hematoma monitor for stabilization  12/30/2019 fecal occult blood test negative hemoglobin stable at 8.2 12/26/2019 Unknown    Hypertension [I10]blood pressure controlled off medications 09/25/2013 Yes    Sinus arrhythmia [I49.8]as above  09/25/2013 Yes    Diastolic dysfunction [I51.89]does not seem to be decompensated. I/O  Monitor   Mild elevated troponin 0.127-0.086 denies chest complains.  EKG without ischemic changes 09/25/2013 Yes    Hypokalemia [E87.6]3.5 replaced  urinary retention - 12/29/19  Bladder scan showed 520 mL in bladder. Straight cath completed 450 mL. urinating with no issues 09/25/2013 Yes       Problems Resolved During this Admission:         Disposition- . patient agrreable for  SNF    DVT prophylaxis addressed with:  Bilateral SCDs            Larry Boateng MD  Attending Staff Physician  Hospital Medicine  pager- [phone] [place] - [phone]

## 2020-01-01 LAB
ALBUMIN SERPL BCP-MCNC: 2.8 G/DL (ref 3.5–5.2)
ALP SERPL-CCNC: 107 U/L (ref 55–135)
ALT SERPL W/O P-5'-P-CCNC: 16 U/L (ref 10–44)
ANION GAP SERPL CALC-SCNC: 8 MMOL/L (ref 8–16)
AST SERPL-CCNC: 22 U/L (ref 10–40)
BASOPHILS # BLD AUTO: 0.04 K/UL (ref 0–0.2)
BASOPHILS NFR BLD: 0.5 % (ref 0–1.9)
BILIRUB SERPL-MCNC: 0.7 MG/DL (ref 0.1–1)
BUN SERPL-MCNC: 12 MG/DL (ref 10–30)
CALCIUM SERPL-MCNC: 9.7 MG/DL (ref 8.7–10.5)
CHLORIDE SERPL-SCNC: 95 MMOL/L (ref 95–110)
CO2 SERPL-SCNC: 26 MMOL/L (ref 23–29)
CREAT SERPL-MCNC: 0.6 MG/DL (ref 0.5–1.4)
DIFFERENTIAL METHOD: ABNORMAL
EOSINOPHIL # BLD AUTO: 0.4 K/UL (ref 0–0.5)
EOSINOPHIL NFR BLD: 5.9 % (ref 0–8)
ERYTHROCYTE [DISTWIDTH] IN BLOOD BY AUTOMATED COUNT: 12.8 % (ref 11.5–14.5)
EST. GFR  (AFRICAN AMERICAN): >60 ML/MIN/1.73 M^2
EST. GFR  (NON AFRICAN AMERICAN): >60 ML/MIN/1.73 M^2
GLUCOSE SERPL-MCNC: 102 MG/DL (ref 70–110)
HCT VFR BLD AUTO: 26.4 % (ref 37–48.5)
HGB BLD-MCNC: 8.6 G/DL (ref 12–16)
IMM GRANULOCYTES # BLD AUTO: 0.04 K/UL (ref 0–0.04)
IMM GRANULOCYTES NFR BLD AUTO: 0.5 % (ref 0–0.5)
LYMPHOCYTES # BLD AUTO: 1.6 K/UL (ref 1–4.8)
LYMPHOCYTES NFR BLD: 21.8 % (ref 18–48)
MCH RBC QN AUTO: 32.7 PG (ref 27–31)
MCHC RBC AUTO-ENTMCNC: 32.6 G/DL (ref 32–36)
MCV RBC AUTO: 100 FL (ref 82–98)
MONOCYTES # BLD AUTO: 0.7 K/UL (ref 0.3–1)
MONOCYTES NFR BLD: 8.8 % (ref 4–15)
NEUTROPHILS # BLD AUTO: 4.7 K/UL (ref 1.8–7.7)
NEUTROPHILS NFR BLD: 62.5 % (ref 38–73)
NRBC BLD-RTO: 0 /100 WBC
PLATELET # BLD AUTO: 330 K/UL (ref 150–350)
PMV BLD AUTO: 9.8 FL (ref 9.2–12.9)
POTASSIUM SERPL-SCNC: 3.5 MMOL/L (ref 3.5–5.1)
PROT SERPL-MCNC: 6.5 G/DL (ref 6–8.4)
RBC # BLD AUTO: 2.63 M/UL (ref 4–5.4)
SODIUM SERPL-SCNC: 129 MMOL/L (ref 136–145)
WBC # BLD AUTO: 7.46 K/UL (ref 3.9–12.7)

## 2020-01-01 PROCEDURE — 25000003 PHARM REV CODE 250: Performed by: HOSPITALIST

## 2020-01-01 PROCEDURE — 85025 COMPLETE CBC W/AUTO DIFF WBC: CPT

## 2020-01-01 PROCEDURE — 25000003 PHARM REV CODE 250: Performed by: PHYSICIAN ASSISTANT

## 2020-01-01 PROCEDURE — 36415 COLL VENOUS BLD VENIPUNCTURE: CPT

## 2020-01-01 PROCEDURE — 99232 SBSQ HOSP IP/OBS MODERATE 35: CPT | Mod: ,,, | Performed by: INTERNAL MEDICINE

## 2020-01-01 PROCEDURE — 99232 PR SUBSEQUENT HOSPITAL CARE,LEVL II: ICD-10-PCS | Mod: ,,, | Performed by: INTERNAL MEDICINE

## 2020-01-01 PROCEDURE — 20600001 HC STEP DOWN PRIVATE ROOM

## 2020-01-01 PROCEDURE — 80053 COMPREHEN METABOLIC PANEL: CPT

## 2020-01-01 RX ADMIN — PANTOPRAZOLE SODIUM 40 MG: 40 TABLET, DELAYED RELEASE ORAL at 09:01

## 2020-01-01 RX ADMIN — ACETAMINOPHEN 1000 MG: 500 TABLET ORAL at 09:01

## 2020-01-01 RX ADMIN — ACETAMINOPHEN 1000 MG: 500 TABLET ORAL at 08:01

## 2020-01-01 RX ADMIN — FERROUS SULFATE TAB EC 325 MG (65 MG FE EQUIVALENT) 325 MG: 325 (65 FE) TABLET DELAYED RESPONSE at 09:01

## 2020-01-01 RX ADMIN — ACETAMINOPHEN 1000 MG: 500 TABLET ORAL at 02:01

## 2020-01-01 RX ADMIN — SENNOSIDES AND DOCUSATE SODIUM 2 TABLET: 8.6; 5 TABLET ORAL at 09:01

## 2020-01-01 NOTE — PROGRESS NOTES
Progress Note  Hospital Medicine    Admit Date: 12/26/2019  Length of Stay:  LOS: 6 days     SUBJECTIVE:         Follow-up For:  Closed fracture of ramus of right pubis    HPI/Interval history (See H&P for complete P,F,SHx) :     Elisha Young is a 89 y.o. female with a PMH of HTN, HLD, Diastolic dysfunction , sinus arrhythmia  who presented to the ED on 12/26/2019 diagnosed with  Hip Pain (Right hip pain s/p falll on Tuesday night. Pt having difficulty with ambulation. Abrasion noted to right upper arm. )  AAOX 3, accompanied by son and grand daughter . mentions that fell 2 days ago on her way out of bathroom  She states that she tripped and fell, striking her right forearm on a door and her right hip on a wood floor. denies palpitations, lightheadedness prior to fall. denies head trauma or loss of consciousness.  Since then, able to get herself her up to the chair and informed her son. she has been able to ambulate with discomfort, refused to come to ER due to madelin.  She reports an associated bruise to the right hip area.  Upon striking the door with her right arm, she sustained a skin tear which has been actively bleeding since the fall.  Her family has been bandaging this with some relief of her bleeding.  while in the ER , upon placing the patient on a cardiac monitor, it is noted with tachardia -  atrial fibrillation with rapid ventricular response? at 140s improved to 100s after IV Metoprolol 5mg x 1.  she states that she has been told in the past that she has some irregular heartbeat.  However, she is not currently being treated for it, prior cardiologist offered her nuclear stress test but patient refused it.  She denies any numbness, weakness, dizziness, chest pain, shortness of breath, or palpitations.  She denies any other complaints.     lives alone in [facility]. ADL/IADL independent at baseline      12/27/2019  Status post orthopedic surgery evaluation - WBAT to RLE. No orthopaedic surgical  intervention at this time. The fractures are very likely amenable to non-operative treatment, particularly considering the patients advanced age. \ patient will likely benefit from placement in IP Rehab vs. SNF to work with PT, however at this time, the patient is adamantly refusing any such arrangement and wishes to go home following hospital workup.   12/28/19  Hb at 8.2. hypokalemia replaced. Stood with therapy yesterday. Attempted to ambulate but pain was quite significant. Will need post mobilization films after ambulating. Will fu in ortho clinic in 2 weeks.started on iron and oxycodone   12/29/19  Bladder scan showed 520 mL in bladder. Straight cath completed 450 mL  12/30/2019 fecal occult blood test negative hemoglobin stable at 8.2  12/31 Doing well. Ready for SNF  1/01 Plan on SNF in am.          Review of Systems:   Pain scale: Constitutional: Positive for activity change. Negative for appetite change, chills, fatigue, fever and unexpected weight change.   HENT: Negative for congestion, ear discharge, ear pain, facial swelling and sinus pain.    Eyes: Negative for pain, discharge, redness and itching.   Respiratory: Negative for cough, choking, chest tightness, shortness of breath and wheezing.    Cardiovascular: Negative for chest pain, palpitations and leg swelling.   Gastrointestinal: Negative for abdominal distention, abdominal pain, anal bleeding, blood in stool, constipation, diarrhea, nausea and vomiting.   Endocrine: Negative for cold intolerance.   Genitourinary: Positive for pelvic pain. Negative for dysuria, flank pain, frequency and hematuria.   Musculoskeletal: Positive for arthralgias (right pelvic pain with movement and ambulation) and gait problem (since fall -). Negative for back pain, joint swelling, neck pain and neck stiffness.   Neurological: Negative for dizziness, syncope, speech difficulty, weakness, light-headedness and headaches.   Hematological: Negative for adenopathy.    Psychiatric/Behavioral: Negative for agitation, confusion, dysphoric mood and suicidal ideas.     OBJECTIVE:     Vital Signs Range (Last 24H):  Temp:  [97.1 °F (36.2 °C)-98.7 °F (37.1 °C)]   Pulse:  []   Resp:  [16-20]   BP: (131-146)/(59-65)   SpO2:  [90 %-96 %]     Physical Exam:  Constitutional: She is oriented to person, place, and time. She appears well-developed and well-nourished.   thin built    HENT:   Head: Normocephalic and atraumatic.   Mouth/Throat: Oropharynx is clear and moist. No oropharyngeal exudate.   Eyes: Pupils are equal, round, and reactive to light. Conjunctivae and EOM are normal. Right eye exhibits no discharge. Left eye exhibits no discharge.   diminished vision left eye   Neck: Normal range of motion. Neck supple. No JVD present. No tracheal deviation present. No thyromegaly present.   Cardiovascular: Normal rate, regular rhythm and normal heart sounds. Exam reveals no gallop and no friction rub.   No murmur heard.  Pulmonary/Chest: Effort normal and breath sounds normal. No stridor. No respiratory distress. She has no wheezes. She has no rales.   Abdominal: Soft. Bowel sounds are normal. She exhibits no distension and no mass. There is no tenderness. There is no guarding.   Musculoskeletal: Normal range of motion. She exhibits no edema.   diminished ROM  right hip - attributes to pain in right pelvis    Lymphadenopathy:     She has no cervical adenopathy.   Neurological: She is oriented to person, place, and time. No cranial nerve deficit.   Skin: Skin is warm and dry.   right hip and gluteal bruise   Psychiatric: She has a normal mood and affect.   Nursing note and vitals reviewed.       Medications:  Medication list was reviewed and changes noted under Assessment/Plan.      Current Facility-Administered Medications:     acetaminophen tablet 1,000 mg, 1,000 mg, Oral, TID, Porter Starr PA-C, 1,000 mg at 01/01/20 0911    acetaminophen tablet 650 mg, 650 mg, Oral, Q6H PRN,  Larry Boateng MD, 650 mg at 12/27/19 1331    albuterol-ipratropium 2.5 mg-0.5 mg/3 mL nebulizer solution 3 mL, 3 mL, Nebulization, Q4H PRN, Porter Starr PA-C    bisacodyl suppository 10 mg, 10 mg, Rectal, Daily PRN, Porter Starr PA-C    dextrose 10% (D10W) Bolus, 12.5 g, Intravenous, PRN, Larry Boateng MD    dextrose 10% (D10W) Bolus, 25 g, Intravenous, PRN, Larry Boateng MD    ferrous sulfate EC tablet 325 mg, 325 mg, Oral, Daily, Larry Boateng MD, 325 mg at 01/01/20 0911    glucagon (human recombinant) injection 1 mg, 1 mg, Intramuscular, PRN, Larry Boateng MD    glucose chewable tablet 16 g, 16 g, Oral, PRN, Larry Boateng MD    glucose chewable tablet 24 g, 24 g, Oral, PRN, Larry Boateng MD    ibuprofen tablet 400 mg, 400 mg, Oral, Q6H PRN, Porter Starr PA-C, 400 mg at 12/27/19 2110    melatonin tablet 6 mg, 6 mg, Oral, Nightly PRN, Porter Starr PA-C    ondansetron injection 4 mg, 4 mg, Intravenous, Q8H PRN, Larry Boateng MD    oxyCODONE 5 mg/5 mL solution 5 mg, 5 mg, Oral, Q12H PRN, Larry Boateng MD, 5 mg at 12/31/19 2257    pantoprazole EC tablet 40 mg, 40 mg, Oral, Daily, Porter Starr PA-C, 40 mg at 01/01/20 0911    polyethylene glycol packet 17 g, 17 g, Oral, Daily, Porter Starr PA-C, 17 g at 12/30/19 0850    promethazine (PHENERGAN) 6.25 mg in dextrose 5 % 50 mL IVPB, 6.25 mg, Intravenous, Q6H PRN, Porter Starr, PA-C    senna-docusate 8.6-50 mg per tablet 2 tablet, 2 tablet, Oral, Daily, Larry Boateng MD, 2 tablet at 01/01/20 0910    sodium chloride 0.9% flush 10 mL, 10 mL, Intravenous, PRN, Kenji Hill MD    sodium chloride 0.9% flush 10 mL, 10 mL, Intravenous, PRN, Larry Boateng MD    acetaminophen, albuterol-ipratropium, bisacodyl, Dextrose 10% Bolus, Dextrose 10% Bolus, glucagon (human recombinant), glucose, glucose, ibuprofen, melatonin, ondansetron, oxyCODONE, promethazine (PHENERGAN) IVPB, sodium chloride 0.9%,  sodium chloride 0.9%    Laboratory/Diagnostic Data:  Reviewed and noted in plan where applicable- Please see chart for full lab data.    Recent Labs   Lab 12/30/19  0329 12/31/19 0404 01/01/20  0330   WBC 6.24 7.41 7.46   HGB 8.1* 8.8* 8.6*   HCT 25.2* 27.1* 26.4*    316 330       Recent Labs   Lab 12/29/19  0458 12/30/19 0329 12/31/19 0404 01/01/20  0330   *  134* 134*  134* 133*  133* 129*   K 3.9  3.9 4.0  4.0 3.8  3.8 3.5     103 101  101 97  97 95   CO2 24  24 25  25 27  27 26   BUN 18  18 13  13 9*  9* 12   CREATININE 0.6  0.6 0.6  0.6 0.6  0.6 0.6   *  118* 105  105 132*  132* 102   CALCIUM 9.0  9.0 9.4  9.4 9.8  9.8 9.7   MG 2.0 1.9 1.9  --    PHOS 3.3 3.4 2.9  --        Recent Labs   Lab 12/26/19  1152  12/30/19  0329 12/31/19 0404 01/01/20  0330   ALKPHOS 109   < > 87  87 101  101 107   ALT 12   < > 15  15 16  16 16   AST 24   < > 24  24 25  25 22   ALBUMIN 3.7   < > 2.4*  2.4* 2.8*  2.8* 2.8*   PROT 7.0   < > 5.9*  5.9* 6.8  6.8 6.5   BILITOT 0.7   < > 0.6  0.6 0.7  0.7 0.7   INR 1.0  --   --   --   --     < > = values in this interval not displayed.        Microbiology labs for the last week  Microbiology Results (last 7 days)     ** No results found for the last 168 hours. **           Imaging Results           CT Pelvis Without Contrast (Final result)  Result time 12/26/19 16:53:50    Final result by Roger Pearl Jr., MD (12/26/19 16:53:50)                 Impression:      Fractures involving the right superior and inferior pubic rami and right sacrum with associated right nayeli-pelvic hematoma as detailed above.    This report was flagged in Epic as abnormal.    COMMUNICATION  This critical result was discovered/received at 1557. The critical information above was relayed directly by Kael Camp MD by telephone to MD Sergio on 12/26/2019 at 1558.    Electronically signed by resident: Kael Camp,  MD  Date:    12/26/2019  Time:    15:30    Electronically signed by: Roger Pearl MD  Date:    12/26/2019  Time:    16:53             Narrative:    EXAMINATION:  CT PELVIS WITHOUT CONTRAST    CLINICAL HISTORY:  Pelvic fracture, known or suspected;    TECHNIQUE:  Low dose axial images, sagittal and coronal reformations were obtained from the iliac crests to the pubic symphysis without the administration of intravenous contrast.    COMPARISON:  Hip radiograph 12/26/2019    FINDINGS:  Visualized loops of small large bowel reveal no evidence of obstruction or inflammation.  There scattered colonic diverticula.  No pelvic ascites, intraperitoneal free air, or abdominopelvic lymphadenopathy identified.    The urinary bladder is grossly unremarkable. Operative change of hysterectomy.    Visualized abdominal aorta reveals moderate calcific atherosclerosis extending into the branch vessels.    There is a comminuted slightly displaced fracture involving the right superior pubic ramus.  There is a mildly displaced fracture involving the medial aspect of the right inferior pubic ramus (sagittal image 102 and coronal image 78.)  An additional nondisplaced fracture is noted in the right sacral ala near the sacroiliac joint (axial series 2, image 158 and coronal image 97) which extends vertically involving nearly the entire craniocaudal dimension of the right nayeli sacrum.  There is no disruption of the sacroiliac joints or pubic symphysis.  There is small volume pelvic free fluid layering along the right lateral aspect of the bladder, in the presacral soft tissues, and extending along the right iliacus muscle belly likely hematoma.  Moderate joint space narrowing of the iliofemoral joints bilaterally.  Advanced degenerative change of the distal lumbar spine.    The extra-pelvic soft tissues reveal mild subcutaneous edema, and inflammatory/contusion fat stranding overlying the right greater trochanter.                                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  No fracture dislocation bone destruction seen.      Electronically signed by: Javier Zamora MD  Date:    12/26/2019  Time:    13:18                             X-Ray Hip 2 View Right (Final result)  Result time 12/26/19 13:18:18    Final result by Javier Zamora III, MD (12/26/19 13:18:18)                 Narrative:    EXAMINATION:  XR HIP 2 VIEW RIGHT    CLINICAL HISTORY:  fall;    FINDINGS:  Two views right: There are possible fractures of the right superior and inferior pubic ramus.  There is baseline DJD.  No femur fracture seen.  CT could be helpful.  There could be an acetabular fracture as well.      Electronically signed by: Javier Zamora MD  Date:    12/26/2019  Time:    13:18                             X-Ray Chest AP Portable (Final result)  Result time 12/26/19 13:07:03    Final result by Perla Rutledge MD (12/26/19 13:07:03)                 Impression:      No source for chest pain established on this study.      Electronically signed by: Perla Rutledge MD  Date:    12/26/2019  Time:    13:07             Narrative:    EXAMINATION:  XR CHEST AP PORTABLE    CLINICAL HISTORY:  Chest Pain;    TECHNIQUE:  Single frontal view of the chest was performed.    COMPARISON:  12/16/2015.    FINDINGS:  The patient has thoracic dextrocurvature.  Allowing for this, mediastinal structures are midline.  Descending thoracic aorta is tortuous and atherosclerotic.  There is also abundant calcific atherosclerosis in the proximal right common carotid artery.    Allowing for calcified costochondral cartilage I detect no acute intrathoracic disease.  The source of the patient's chest pain is not  "established on this study.    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    I detect no pneumothorax, pneumomediastinum or pneumoperitoneum.    Advanced degenerative changes are present at the shoulders in this 89-year-old woman.                                Estimated body mass index is 20.65 kg/m² as calculated from the following:    Height as of this encounter: 5' 2" (1.575 m).    Weight as of this encounter: 51.2 kg (112 lb 14 oz).    I & O (Last 24H):    Intake/Output Summary (Last 24 hours) at 1/1/2020 1017  Last data filed at 1/1/2020 0500  Gross per 24 hour   Intake 840 ml   Output 1200 ml   Net -360 ml       Body mass index is 20.65 kg/m².    Estimated Creatinine Clearance: 47.3 mL/min (based on SCr of 0.6 mg/dL).    ASSESSMENT/PLAN:     Active Problems:    Active Diagnoses:     Diagnosis Date Noted POA    PRINCIPAL PROBLEM:  Closed fracture of ramus of right pubis [S32.591A]. mentions that fell 2 days ago on her way out of bathroom  She states that she tripped and fell, striking her right forearm on a door and her right hip on a wood floor. denies palpitations, lightheadedness prior to fall. denies head trauma or loss of consciousness.  X ray right hip - possible fractures of the right superior and inferior pubic ramus   CT pelvis without contrast - Fractures involving the right superior and inferior pubic rami and right sacrum with associated right nayeli-pelvic hematoma as detailed above. orthopedic surgery consulted   12/27/2019  Status post orthopedic surgery evaluation - WBAT to RLE. No orthopaedic surgical intervention at this time. The fractures are very likely amenable to non-operative treatment, particularly considering the patients advanced age. \ patient will likely benefit from placement in IP Rehab vs. SNF to work with PT, however at this time, the patient is adamantly refusing any such arrangement and wishes to go home following hospital workup.   12/28/19  Hb " at 8.2. hypokalemia replaced. Stood with therapy yesterday. Attempted to ambulate but pain was quite significant. Will need post mobilization films after ambulating. Will fu in ortho clinic in 2 weeks. . Will fu in ortho clinic in 2 weeks.started on iron and oxycodone    12/26/2019 Unknown    New onset atrial fibrillation ?[I48.91]EKG - Sinus tachycardia with frequent Premature atrial complexes  while in the ER , upon placing the patient on a cardiac monitor, it is noted with tachardia -  atrial fibrillation with rapid ventricular response? at 140s improved to 100s after IV Metoprolol 5mg x 1.  she states that she has been told in the past that she has some irregular heartbeat.  However, she is not currently being treated for it, prior cardiologist offered her nuclear stress test but patient refused it.  continue Metoprolol as needed. discussed with cardiology. no evidence of afib per EKG in the hospital   12/26/2019 Yes    Fall [W19.XXXA]fall precautions 12/26/2019 Yes    Hyponatremia [E87.1]sodium at 133. monitor  12/30/2019 sodium at 134 12/26/2019 Unknown    Anemia [D64.9]Hb 13.3 to 10.4 overlast 3 days.blood loss from skin tear RUE vs bruise secondary to fall.monitor.hold ASA for now   01/27/2019 hemoglobin dropped to 9.2- likely from right hemipelvis hematoma monitor for stabilization  12/30/2019 fecal occult blood test negative hemoglobin stable at 8.2 12/26/2019 Unknown    Hypertension [I10]blood pressure controlled off medications 09/25/2013 Yes    Sinus arrhythmia [I49.8]as above  09/25/2013 Yes    Diastolic dysfunction [I51.89]does not seem to be decompensated. I/O  Monitor   Mild elevated troponin 0.127-0.086 denies chest complains.  EKG without ischemic changes 09/25/2013 Yes    Hypokalemia [E87.6]3.5 replaced  urinary retention - 12/29/19  Bladder scan showed 520 mL in bladder. Straight cath completed 450 mL. urinating with no issues 09/25/2013 Yes       Problems Resolved During this Admission:          Disposition- . patient agrreable for SNF    DVT prophylaxis addressed with:  Bilateral SCDs            Larry Boateng MD  Attending Staff Physician  [facility]  pager- [phone] Qhxzvocbapt - [phone]

## 2020-01-01 NOTE — CARE UPDATE
xrays reviewed after mobilization. Patient is WBAT. xrays show stable fracture pattern. Will arrange for fu in orthopedic clinic in 2 weeks.  Please call with any questions

## 2020-01-01 NOTE — NURSING
Pt agreed to peripheral IV access after explaining the need for one in the event of an emergency. Obtained 22 G L FA.

## 2020-01-01 NOTE — PLAN OF CARE
Pt free of falls and injury during shift. POC reviewed with pt VS stable and AAox4. sinus arrhythmia On telemetry. R upper arm dressing, CDI. SCDs in place. Frequent weight shift provided. PW in place with no complaints. possible d/c to snf pending approval. No acute events noted at this time. No complaints. Educated pt why she is at risk for falls and to use call light for assistance ambulating. Yellow non-slip socks on pt. Bed low and locked, call light with in reach. Will continue to monitor.

## 2020-01-01 NOTE — PLAN OF CARE
AO x 4. Pt remained free of falls, trauma, injury. Pt had no complaints through out night. VSS; on RA. Pt desired to sleep in chair. Education provided on risk of falls and importance of call bell. Pain treated with PRN oxycodone. Plan to discharge to SNF once placement approved. TB test due to be read today 1/1/20 . Reviewed plan of care. Pt tolerating plan of care. Will continue to monitor.

## 2020-01-02 VITALS
BODY MASS INDEX: 20.77 KG/M2 | RESPIRATION RATE: 18 BRPM | WEIGHT: 112.88 LBS | OXYGEN SATURATION: 95 % | HEART RATE: 99 BPM | TEMPERATURE: 99 F | SYSTOLIC BLOOD PRESSURE: 134 MMHG | HEIGHT: 62 IN | DIASTOLIC BLOOD PRESSURE: 72 MMHG

## 2020-01-02 LAB
ALBUMIN SERPL BCP-MCNC: 2.7 G/DL (ref 3.5–5.2)
ALP SERPL-CCNC: 123 U/L (ref 55–135)
ALT SERPL W/O P-5'-P-CCNC: 13 U/L (ref 10–44)
ANION GAP SERPL CALC-SCNC: 9 MMOL/L (ref 8–16)
AST SERPL-CCNC: 23 U/L (ref 10–40)
BASOPHILS # BLD AUTO: 0.04 K/UL (ref 0–0.2)
BASOPHILS NFR BLD: 0.5 % (ref 0–1.9)
BILIRUB SERPL-MCNC: 0.7 MG/DL (ref 0.1–1)
BUN SERPL-MCNC: 10 MG/DL (ref 10–30)
CALCIUM SERPL-MCNC: 9.4 MG/DL (ref 8.7–10.5)
CHLORIDE SERPL-SCNC: 99 MMOL/L (ref 95–110)
CO2 SERPL-SCNC: 25 MMOL/L (ref 23–29)
CREAT SERPL-MCNC: 0.6 MG/DL (ref 0.5–1.4)
DIFFERENTIAL METHOD: ABNORMAL
EOSINOPHIL # BLD AUTO: 0.4 K/UL (ref 0–0.5)
EOSINOPHIL NFR BLD: 5.3 % (ref 0–8)
ERYTHROCYTE [DISTWIDTH] IN BLOOD BY AUTOMATED COUNT: 12.8 % (ref 11.5–14.5)
EST. GFR  (AFRICAN AMERICAN): >60 ML/MIN/1.73 M^2
EST. GFR  (NON AFRICAN AMERICAN): >60 ML/MIN/1.73 M^2
GLUCOSE SERPL-MCNC: 107 MG/DL (ref 70–110)
HCT VFR BLD AUTO: 25.3 % (ref 37–48.5)
HGB BLD-MCNC: 8.3 G/DL (ref 12–16)
IMM GRANULOCYTES # BLD AUTO: 0.05 K/UL (ref 0–0.04)
IMM GRANULOCYTES NFR BLD AUTO: 0.6 % (ref 0–0.5)
LYMPHOCYTES # BLD AUTO: 1.9 K/UL (ref 1–4.8)
LYMPHOCYTES NFR BLD: 24.7 % (ref 18–48)
MCH RBC QN AUTO: 32.5 PG (ref 27–31)
MCHC RBC AUTO-ENTMCNC: 32.8 G/DL (ref 32–36)
MCV RBC AUTO: 99 FL (ref 82–98)
MONOCYTES # BLD AUTO: 0.7 K/UL (ref 0.3–1)
MONOCYTES NFR BLD: 8.5 % (ref 4–15)
NEUTROPHILS # BLD AUTO: 4.7 K/UL (ref 1.8–7.7)
NEUTROPHILS NFR BLD: 60.4 % (ref 38–73)
NRBC BLD-RTO: 0 /100 WBC
PLATELET # BLD AUTO: 325 K/UL (ref 150–350)
PMV BLD AUTO: 9.8 FL (ref 9.2–12.9)
POTASSIUM SERPL-SCNC: 3.6 MMOL/L (ref 3.5–5.1)
PROT SERPL-MCNC: 6.3 G/DL (ref 6–8.4)
RBC # BLD AUTO: 2.55 M/UL (ref 4–5.4)
SODIUM SERPL-SCNC: 133 MMOL/L (ref 136–145)
TB INDURATION 48 - 72 HR READ: 0 MM
WBC # BLD AUTO: 7.73 K/UL (ref 3.9–12.7)

## 2020-01-02 PROCEDURE — 94761 N-INVAS EAR/PLS OXIMETRY MLT: CPT

## 2020-01-02 PROCEDURE — 85025 COMPLETE CBC W/AUTO DIFF WBC: CPT

## 2020-01-02 PROCEDURE — 97530 THERAPEUTIC ACTIVITIES: CPT

## 2020-01-02 PROCEDURE — 25000003 PHARM REV CODE 250: Performed by: PHYSICIAN ASSISTANT

## 2020-01-02 PROCEDURE — 99238 HOSP IP/OBS DSCHRG MGMT 30/<: CPT | Mod: ,,, | Performed by: INTERNAL MEDICINE

## 2020-01-02 PROCEDURE — 36415 COLL VENOUS BLD VENIPUNCTURE: CPT

## 2020-01-02 PROCEDURE — 25000003 PHARM REV CODE 250: Performed by: HOSPITALIST

## 2020-01-02 PROCEDURE — 80053 COMPREHEN METABOLIC PANEL: CPT

## 2020-01-02 PROCEDURE — 99238 PR HOSPITAL DISCHARGE DAY,<30 MIN: ICD-10-PCS | Mod: ,,, | Performed by: INTERNAL MEDICINE

## 2020-01-02 RX ADMIN — SENNOSIDES AND DOCUSATE SODIUM 2 TABLET: 8.6; 5 TABLET ORAL at 09:01

## 2020-01-02 RX ADMIN — FERROUS SULFATE TAB EC 325 MG (65 MG FE EQUIVALENT) 325 MG: 325 (65 FE) TABLET DELAYED RESPONSE at 09:01

## 2020-01-02 RX ADMIN — ACETAMINOPHEN 1000 MG: 500 TABLET ORAL at 09:01

## 2020-01-02 RX ADMIN — PANTOPRAZOLE SODIUM 40 MG: 40 TABLET, DELAYED RELEASE ORAL at 09:01

## 2020-01-02 RX ADMIN — ACETAMINOPHEN 1000 MG: 500 TABLET ORAL at 03:01

## 2020-01-02 NOTE — PLAN OF CARE
01/01/20 1925 01/02/20 0800   Stool Assessment   Last Bowel Movement 01/01/20 01/01/20     Copied from Flowsheets to send to nursing home.     Erica Rueda YING  [facility]  Ext. [phone]

## 2020-01-02 NOTE — PLAN OF CARE
Goals reviewed and remain appropriate.  Saadia Beckham, OTR/L  Pager #: [phone]  2020    Problem: Occupational Therapy Goal  Goal: Occupational Therapy Goal  Description  Goals to be met by: 1/10/19     Patient will increase functional independence with ADLs by performin. LE Dressing with Minimal Assistance using AD    REVISED: LE Dressing with SBA.  2. Grooming while standing with Contact Guard Assistance.   REVISED: Grooming while standing with Supervision.  3. Toileting from bedside commode with Minimal Assistance for hygiene and clothing management.    REVISED: Toileting from toilet with SBA for clothing management.  4. Toilet transfer to bedside commode with Minimal Assistance.   REVISED: Toilet transfer to toilet with supervision.      Outcome: Ongoing, Progressing

## 2020-01-02 NOTE — PLAN OF CARE
[facility]     Department of Hospital Medicine     [address] [place] [address]     [phone] after hours  [phone] fax       NURSING HOME ORDERS    Patient Name: Elisha Young  YOB: 1927 01/02/2020    Admit to Nursing Home:   Skilled Bed                                              Diagnoses:  Active Hospital Problems    Diagnosis  POA    *Closed fracture of ramus of right pubis [S32.591A]  Yes    Alteration in skin integrity [R23.9]  Yes    Fall [W19.XXXA]  Yes    Anemia [D64.9]  Yes    Hypertension [I10]  Yes    Sinus arrhythmia [I49.8]  Yes    Diastolic dysfunction [I51.89]  Yes      Resolved Hospital Problems    Diagnosis Date Resolved POA    New onset atrial fibrillation [I48.91] 12/29/2019 Yes    Hyponatremia [E87.1] 12/29/2019 Yes    Hypokalemia [E87.6] 12/29/2019 Yes       Patient is homebound due to:  Closed fracture of ramus of right pubis    Allergies:Review of patient's allergies indicates:  No Known Allergies    Vitals:       Routine, once monthly         Diet: regular diet                  Acitivities:    - Weight bearing: as tolerated    LABS:  Per facility protocol     ours. Use wedge pillows to anchor patient    CONSULTS:     Physical Therapy to evaluate and treat     Occupational Therapy to evaluate and treat              Elisha Young   Home Medication Instructions JABARI:[id number]    Printed on:01/02/20 0801   Medication Information                      acetaminophen (TYLENOL) 325 MG tablet  Take 2 tablets (650 mg total) by mouth every 6 (six) hours as needed.             ANTIOX #11/OM3/DHA/EPA/LUT/SATYA (OCUVITE ADULT 50+ ORAL)  Take  1 tablet  by mouth. daily             ferrous sulfate 325 (65 FE) MG EC tablet  Take 1 tablet (325 mg total) by mouth once daily.                          senna-docusate 8.6-50 mg (PERICOLACE) 8.6-50 mg per tablet  Take 2 tablets by mouth once daily.             simvastatin  (ZOCOR) 20 MG tablet Daily                         _________________________________  Nadeem Benedict MD  01/02/2020

## 2020-01-02 NOTE — PLAN OF CARE
0830  CM was informed by Dr. Benedict that the patient is medically stable for discharge. Referral sent to [facility]. Awaiting acceptance. SNF orders noted.     1430  Acceptance to [facility] noted. CM informed YING Maldonado of above. Voicemail message left for the patient's son, Bull Young (), informing of above & requesting a return call to discuss the patient's discharge.     Will continue to follow.

## 2020-01-02 NOTE — PLAN OF CARE
Plan of care discussed with patient. Patient is free of fall/trauma/injury. Complains of hip pain, scheduled tylenol administered. Blanchable redness noted to sacrum, cushion on chaor utilized, barrier cream, and foam dressing applied to site. All questions addressed. Will continue to monitor.

## 2020-01-02 NOTE — PLAN OF CARE
01/02/20 1628   Post-Acute Status   Post-Acute Authorization Placement   Post-Acute Placement Status Set-up Complete     SW scheduled d/c transportation to [facility] through [facility]. Patient is scheduled to be picked up at 5:30pm. YING called and provided Pt nurse with updated about transportation being set up. Nurse will call report to Fiorella at [phone]. Patient will be in room P209 . YING is in communication with patient's CM, and patient's Care Team.     rEica Rueda, YING  [facility]  Ext. [phone]

## 2020-01-02 NOTE — PT/OT/SLP PROGRESS
Occupational Therapy   Treatment    Name: Elisha Young  MRN: [id number]  Admitting Diagnosis:  Closed fracture of ramus of right pubis       Recommendations:     Discharge Recommendations: nursing facility, skilled  Discharge Equipment Recommendations:  none  Barriers to discharge:  None    Assessment:     Elisha Young is a 89 y.o. female with a medical diagnosis of Closed fracture of ramus of right pubis. She presents with the following performance deficits affecting function are weakness, impaired endurance, impaired self care skills, impaired functional mobilty, gait instability, impaired balance, pain, decreased lower extremity function. Patient required encouragement to participate in therapy session but agreed transfer for bed to chair. Patient continues to require cues for safety using RW for transfers and ambulation. At this time, patient will continue to benefit from acute skilled therapy intervention to address deficits/underlying impairments and progress towards prior level of function. After discharge, patient would benefit from continued skilled therapy intervention at SNF to progress more towards independence in ADLs and functional mobility before going home.    Rehab Prognosis:  Good; patient would benefit from acute skilled OT services to address these deficits and reach maximum level of function.       Plan:     Patient to be seen 4 x/week to address the above listed problems via self-care/home management, therapeutic activities, therapeutic exercises  · Plan of Care Expires: 01/25/20  · Plan of Care Reviewed with: patient, family    Subjective     Pain/Comfort:  · Pain Rating 1: (Patient did not rate pain but c/o R hip pain in standing.)    Objective:     Communicated with: RN prior to session. Patient found HOB elevated with telemetry, [employer] and family present upon OT entry to room.    General Precautions: Standard, fall   Orthopedic Precautions:RLE weight bearing as tolerated   Braces: N/A      Occupational Performance:     Bed Mobility:    · Patient completed Scooting/Bridging with stand by assistance  · Patient completed Supine to Sit with stand by assistance and HOB elevated     Functional Mobility/Transfers:  · Patient completed Sit <> Stand Transfer with stand by assistance  with  rolling walker   · Patient completed Bed <> Chair Transfer using Stand Pivot technique with contact guard assistance with rolling walker  · Functional Mobility: Patient ambulated ~10 feet using RW with CGA. Patient declined further ambulation at this time.  · Cues for pacing (slowing down) and RW management  · Patient continues to use B UE supported on RW to off load weight distributed through R LE    Activities of Daily Living:  · Not assessed this session    [facility] 6 Click ADL: 19    Treatment & Education:   Therapist provided facilitation and instruction of proper body mechanics, energy conservation, and fall prevention strategies during tasks listed above.   Patient reported skin irritation on R buttock and therapist notified RN. Therapist submitted order for chair cushion to assist with pressure relief when sitting in bedside chair.   Instructed patient to sit in bedside chair daily to increase OOB/activity tolerance.   Instructed patient to use call light to have nursing staff assist with transfers.    Educated patient on OT POC and answered all questions within OT scope of practice.   Whiteboard updated     Patient left up in chair with all lines intact, call button in reach, RN notified and family presentEducation:      GOALS:   Multidisciplinary Problems     Occupational Therapy Goals        Problem: Occupational Therapy Goal    Goal Priority Disciplines Outcome Interventions   Occupational Therapy Goal     OT, PT/OT Ongoing, Progressing    Description:  Goals to be met by: 1/10/19     Patient will increase functional independence with ADLs by performin. LE Dressing with Minimal Assistance using AD     REVISED: LE Dressing with SBA.  2. Grooming while standing with Contact Guard Assistance.   REVISED: Grooming while standing with Supervision.  3. Toileting from bedside commode with Minimal Assistance for hygiene and clothing management.    REVISED: Toileting from toilet with SBA for clothing management.  4. Toilet transfer to bedside commode with Minimal Assistance.   REVISED: Toilet transfer to toilet with supervision.                       Time Tracking:     OT Date of Treatment: 01/02/20  OT Start Time: 1226  OT Stop Time: 1240  OT Total Time (min): 14 min    Billable Minutes:Therapeutic Activity 14    Saadia Beckham OT  1/2/2020

## 2020-01-02 NOTE — PLAN OF CARE
Patient remains free of falls or injury. Patient denies pain; continued on extra strength tylenol TID. Ortho c/s completed for right hip fracture; no surgical intervention at this time. Plan for d/c to SNF when bed available; possibly today. Plan of care reviewed with patient and family.

## 2020-01-03 NOTE — PROGRESS NOTES
Patient is ready for discharge. Patient stable alert and oriented. No complaints of pain. IV in place, until ride arrives. D/C complete just awaiting wheelchair van. Discussed discharge plan. Reviewed medications, appointments, and answered questions with patient and family.

## 2020-01-03 NOTE — PROGRESS NOTES
[facility]                                 Skilled Nursing Facility                   Progress Note     Admit Date:  01/02/2020  JULIO CESAR   Principal Problem:  Debility related to closed fracture of ramus of right pubis  HPI obtained from patient interview and chart review     Visit Date: 1/6/2020    Chief Complaint: Establish Care/ Initial Visit s/p hospitalization for closed fracture of ramus of right pubis with pelvic hematoma after fall    HPI:   Ms. Young is a 89 y.o. female with a PMH of HTN, HLD, Diastolic dysfunction , sinus arrhythmia.  [place] [facility] [place] resulting in a closed fracture of ramus of right pubis with pelvic hematoma.  Orthopedics consulted, no surgical intervention recommended due to patient's age.  Patient also experienced tachycardia in the ED on admit, given 1 dose of metoprolol with resolution, cardiology does not think it was AFib and no further treatment was indicated.  Patient also had a drop in her H&H, fecal occult blood testing negative, started on iron, likely from pelvic hematoma after fall.  Therapy recommended skilled nursing for rehab prior to discharging home.  Patient came to facility with stage II pressure ulcer to right buttock, facility WC RN to follow closely.  Lab work completed on 01/03 revealed in H&H of 8/24, likely related to hematoma, albumin 3.3 with pre-albumin of 13-both low, need supplementation.  Changes to treatment plan ordered appropriately.    Patient will be treated at [facility] SNF with PT and OT to improve functional status and ability to perform ADLs.     Past Medical History: Patient has a past medical history of CHF (congestive heart failure), Dyslipidemia, Hyperlipidemia, and Hypertension.    Past Surgical History: Patient has a past surgical history that includes Hysterectomy and tonsils.    Social History: Patient reports that she has never  smoked. She has never used smokeless tobacco. She reports that she does not drink alcohol or use drugs.    Family History: family history includes Heart disease in her father.    Allergies: Patient has No Known Allergies.    ROS  Constitutional: Negative for fever or fatigue.   Eyes: Negative for blurred vision, double vision and discharge.   Respiratory: Negative for cough, shortness of breath and wheezing.    Cardiovascular: Negative for chest pain, palpitations, and leg swelling.   Gastrointestinal: Negative for abdominal pain, constipation, diarrhea, nausea and vomiting.   Genitourinary: Negative for dysuria, frequency and urgency.   Musculoskeletal:  + generalized weakness. Negative for back pain and myalgias.   Skin: Negative for itching and rash.   Neurological: Negative for dizziness, speech change, and headaches.   Psychiatric/Behavioral: Negative for depression. The patient is not nervous/anxious.      PEx  BP:  125/58, , most recent weight 114.5 lb     Constitutional: Patient appears well-developed and in no distress   Head: Normocephalic and atraumatic.   Eyes: Pupils are equal, round, and reactive to light.   Neck: Normal range of motion. Neck supple.   Cardiovascular: Normal rate, regular rhythm and normal heart sounds.    Pulmonary/Chest: Effort normal and breath sounds are clear  Abdominal: Soft. Bowel sounds are normal.   Musculoskeletal:  Decreased range of motion.   Neurological: Alert and oriented to person, place, and time.   Psychiatric: Normal mood and affect. Behavior is normal.   Skin: Skin is warm and dry. Full skin assessment completed during visit, bruising on right side of body noted from fall, + stage II pressure ulcer to R buttock ,present on admit    Wound care evaluation completed 1/6/20:    Wound location: R buttock stage II pressure ulcer  Present on admit: yes  Appearance of wound:  100% epithelial   Drainage characteristic: None  Wound length: 0.71cm   Wound width:  0.54cm  Wound depth: 0.1cm   Peggy wound area: normal skin tone   Following: WC NP- weekly. This NP monthly- last observed on (1/6/20), wound care RN weekly- last observed on (1/6/20)      Assessment and Plan:    After care recent closed fracture of ramus of right pubis with pelvic hematoma, secondary to fall, ongoing  Debility 2/2 pelvic fx, ongoing  - Continue with PT/OT for gait training and strengthening and restoration of ADL's   - Encourage mobility, OOB in chair, and early ambulation as appropriate  - Fall precautions   - Monitor for bowel and bladder dysfunction  - Monitor for and prevent skin breakdown and pressure ulcers  - WBAT to RLE. No orthopaedic surgical intervention at this time  - pain control:  Tylenol 1000 mg scheduled, initiated tramadol 50 mg p.o. q.6 hours p.r.n. pain on 01/03 per reports of uncontrolled pain  - BM regimen:  Senna-Colace q.d.  - next follow-up appointment with Orthopedics scheduled for 1/7, will await recs    Anemia due to blood loss, new  Secondary to fall and pelvic hematoma, ongoing  -H&H on admit trended from 13-8 on discharge, patient started on iron, fecal occult blood test negative, bleeding likely from hematoma and fall  -continue ferrous sulfate p.o. Q.d., most recent H&H 8/24, will trend    Hyponatremia, new, stable  Hypokalemia, resolved  -NA on discharge 129, will trend on skilled  -NA on 01/31 33, improving, K 3.3 on admit to facility-initiated KCL 40 mEq p.o. x1 dose    Malnutrition, new  -albumin 2.9, pre-albumin 13 on admit to skilled facility  -initiate promod 30 mL p.o. B.i.d., initiate MVI with minerals     Remote history of hypertension with HLD, chronic  History of CHF with diastolic dysfunction, chronic, stable  -home meds:  HCTZ 12.5 mg q.d., metoprolol 25 mg q.d., hold if HR less than 60, Norvasc 10 mg q.d., gem fib resolved 600 mg p.o. b.i.d.,  q.d., MVI with minerals q.d.-not continued on DC from admit  -not on any medications for BP on dc from  facility, controlled on diet  -continue simvastatin q.d.  -1/6 , likely due to beta-blocker being DC aide during admit, initiate metoprolol 25 mg p.o. q.d. with hold parameters of hold less than HR 60, initiate gemfibrozil 600 mg PO BID    Recent history of tachycardia, rule out AFib, resolved  -tachycardia on admit in ED, thought to be AFib Cardiology reported no AFib, given metoprolol IV x1 dose, no further treatment indicated        Future Appointments   Date Time Provider Department Center   1/7/2020  9:30 AM Isabel Nguyen PA-C [facility] ORTHO Jarett Marquis I certify that SNF services are required to be given on an inpatient basis because Elisha Young needs for skilled nursing care and/or skilled rehabilitation are required on a daily basis and such services can only practically be provided in a skilled nursing facility setting and are for an ongoing condition for which she received inpatient care in the hospital.     Total time of the visit 111 minutes  Non physical exam/ non charting time:81 minutes   Start Time:0800  Stop Time:0951  Description of non physical exam/non charting time: counseling patient on clinical conditions and therapies provided regarding pain control, therapy plan of care, management of chronic conditions.  Extensive chart review completed including all consultation notes.  All pertinent laboratory and radiographical images reviewed.        Carey Alcaraz NP          Patient note was created using MModal Dictation.  Any errors in syntax or even information may not have been identified and edited on initial review prior to signing this note.

## 2020-01-03 NOTE — PLAN OF CARE
01/03/20 0744   Final Note   Assessment Type Final Discharge Note     Patient discharged to [facility] on 1/2/20.

## 2020-01-03 NOTE — DISCHARGE SUMMARY
Discharge Summary  Hospital Medicine    Attending Provider on Discharge: Nadeem Benedict MD  [facility] Medicine Team[place] [facility]  Date of Admission:  12/26/2019     Date of Discharge:  1/2/2020  Length of Stay:  LOS: 7 days   Code status: Full Code        Active Hospital Problems    Diagnosis  POA    *Closed fracture of ramus of right pubis [S32.591A]  Yes    Alteration in skin integrity [R23.9]  Yes    Fall [W19.XXXA]  Yes    Anemia [D64.9]  Yes    Hypertension [I10]  Yes    Sinus arrhythmia [I49.8]  Yes    Diastolic dysfunction [I51.89]  Yes      Resolved Hospital Problems    Diagnosis Date Resolved POA    New onset atrial fibrillation [I48.91] 12/29/2019 Yes    Hyponatremia [E87.1] 12/29/2019 Yes    Hypokalemia [E87.6] 12/29/2019 Yes        Dimple Young is a 89 y.o. female with a PMH of HTN, HLD, Diastolic dysfunction , sinus arrhythmia  who presented to the ED on 12/26/2019 diagnosed with  Hip Pain (Right hip pain s/p falll on Tuesday night. Pt having difficulty with ambulation. Abrasion noted to right upper arm. )  AAOX 3, accompanied by son and grand daughter . mentions that fell 2 days ago on her way out of bathroom  She states that she tripped and fell, striking her right forearm on a door and her right hip on a wood floor. denies palpitations, lightheadedness prior to fall. denies head trauma or loss of consciousness.  Since then, able to get herself her up to the chair and informed her son. she has been able to ambulate with discomfort, refused to come to ER due to madelin.  She reports an associated bruise to the right hip area.  Upon striking the door with her right arm, she sustained a skin tear which has been actively bleeding since the fall.  Her family has been bandaging this with some relief of her bleeding.  while in the ER , upon placing the patient on a cardiac monitor, it is noted with tachardia -  atrial fibrillation with rapid ventricular response? at 140s improved to 100s  after IV Metoprolol 5mg x 1.  she states that she has been told in the past that she has some irregular heartbeat.  However, she is not currently being treated for it, prior cardiologist offered her nuclear stress test but patient refused it.  She denies any numbness, weakness, dizziness, chest pain, shortness of breath, or palpitations.  She denies any other complaints.      Hospital Course x-rays were consistent with a pelvic fracture.  Was seen by Orthopedics who recommended no surgical intervention.  Repeat x-rays were obtained after ambulation which revealed no worsening of pelvic fracture.  Patient will be discharged to skilled nursing facility for a brief stay as independent living is hoped to be achieved       Recent Labs   Lab 12/31/19 0404 01/01/20 0330 01/02/20 0443   WBC 7.41 7.46 7.73   HGB 8.8* 8.6* 8.3*   HCT 27.1* 26.4* 25.3*    330 325     Recent Labs   Lab 12/29/19 0458 12/30/19 0329 12/31/19 0404 01/01/20  0330 01/02/20  0443   *  134* 134*  134* 133*  133* 129* 133*   K 3.9  3.9 4.0  4.0 3.8  3.8 3.5 3.6     103 101  101 97  97 95 99   CO2 24  24 25  25 27  27 26 25   BUN 18  18 13  13 9*  9* 12 10   CREATININE 0.6  0.6 0.6  0.6 0.6  0.6 0.6 0.6   *  118* 105  105 132*  132* 102 107   CALCIUM 9.0  9.0 9.4  9.4 9.8  9.8 9.7 9.4   MG 2.0 1.9 1.9  --   --    PHOS 3.3 3.4 2.9  --   --      Recent Labs   Lab 12/31/19 0404 01/01/20  0330 01/02/20  0443   ALKPHOS 101  101 107 123   ALT 16  16 16 13   AST 25  25 22 23   ALBUMIN 2.8*  2.8* 2.8* 2.7*   PROT 6.8  6.8 6.5 6.3   BILITOT 0.7  0.7 0.7 0.7      No results for input(s): CPK, CPKMB, MB, TROPONINI in the last 72 hours.  No results for input(s): LACTATE in the last 72 hours.    No results for input(s): POCTGLUCOSE in the last 168 hours.   No results found for: HGBA1C    Procedures: none    Consultants:Ortho    Discharge Medication List as of 1/2/2020  6:44 PM      START taking these  medications    Details   acetaminophen (TYLENOL) 325 MG tablet Take 2 tablets (650 mg total) by mouth every 6 (six) hours as needed., Starting Tue 12/31/2019, Normal      ferrous sulfate 325 (65 FE) MG EC tablet Take 1 tablet (325 mg total) by mouth once daily., Starting Tue 12/31/2019, Normal      oxyCODONE (ROXICODONE) 5 mg/5 mL Soln Take 5 mLs (5 mg total) by mouth every 12 (twelve) hours as needed (severe pain)., Starting Tue 12/31/2019, Until Tue 1/7/2020, Print      senna-docusate 8.6-50 mg (PERICOLACE) 8.6-50 mg per tablet Take 2 tablets by mouth once daily., Starting Tue 12/31/2019, Normal         CONTINUE these medications which have NOT CHANGED    Details   ANTIOX #11/OM3/DHA/EPA/LUT/SATYA (OCUVITE ADULT 50+ ORAL) Take by mouth., Until Discontinued, Historical Med      simvastatin (ZOCOR) 20 MG tablet Starting 9/5/2013, Until Discontinued, Historical Med         STOP taking these medications       amlodipine (NORVASC) 10 MG tablet Comments:   Reason for Stopping:         aspirin (ECOTRIN) 325 MG EC tablet Comments:   Reason for Stopping:         GARLIC ORAL Comments:   Reason for Stopping:         gemfibrozil (LOPID) 600 MG tablet Comments:   Reason for Stopping:         HYDROCHLOROTHIAZIDE ORAL Comments:   Reason for Stopping:         lisinopril (PRINIVIL,ZESTRIL) 20 MG tablet Comments:   Reason for Stopping:         metoprolol succinate (TOPROL-XL) 25 MG 24 hr tablet Comments:   Reason for Stopping:         potassium chloride (K-TAB) 20 mEq Comments:   Reason for Stopping:         aspirin (ECOTRIN) 81 MG EC tablet Comments:   Reason for Stopping:               Discharge Diet:regular diet with     Activity: activity as tolerated    Discharge Condition: Good    Disposition: Home or Self Care    Tests pending at the time of discharge: none      Time spent  on the discharge of the patient including review of hospital course with the patient. reviewing discharge medications and arranging follow-up care 35  minutes    Discharge examination Patient was seen and examined on the date of discharge and determined to be suitable for discharge.    Discharge plan     Future Appointments   Date Time Provider Department Center   1/7/2020  9:30 AM Isabel Nguyen PA-C [facility] Benitez Benedict MD

## 2020-01-06 ENCOUNTER — DOCUMENTATION ONLY (OUTPATIENT)
Dept: HEPATOLOGY | Facility: HOSPITAL | Age: 85
End: 2020-01-06

## 2020-01-07 NOTE — PT/OT/SLP DISCHARGE
Occupational Therapy Discharge Summary    Elisha Young  MRN: [id number]   Principal Problem: Closed fracture of ramus of right pubis      Patient Discharged from hospital and acute Occupational Therapy on 2020. Please refer to prior OT note dated 2020 for functional status.    Assessment:      Patient appropriate for care in another setting.    Objective:     GOALS:   Multidisciplinary Problems     Occupational Therapy Goals     Not on file          Multidisciplinary Problems (Resolved)        Problem: Occupational Therapy Goal    Goal Priority Disciplines Outcome Interventions   Occupational Therapy Goal   (Resolved)     OT, PT/OT Met    Description:  Goals to be met by: 1/10/19     Patient will increase functional independence with ADLs by performin. LE Dressing with Minimal Assistance using AD    REVISED: LE Dressing with SBA.  2. Grooming while standing with Contact Guard Assistance.   REVISED: Grooming while standing with Supervision.  3. Toileting from bedside commode with Minimal Assistance for hygiene and clothing management.    REVISED: Toileting from toilet with SBA for clothing management.  4. Toilet transfer to bedside commode with Minimal Assistance.   REVISED: Toilet transfer to toilet with supervision.                     Patient met original goals and goals were upgraded as appropriate for patient.    Reasons for Discontinuation of Therapy Services  Transfer to alternate level of care.      Plan:     Patient Discharged to: Skilled Nursing Facility    Saadia Beckham OT  2020

## 2020-01-12 NOTE — PROGRESS NOTES
[facility]                                 Skilled Nursing Facility                   Progress Note     Admit Date:  01/02/2020  JULIO CESAR   Principal Problem:  Debility related to closed fracture of ramus of right pubis  HPI obtained from patient interview and chart review     Visit Date: 1/13/2020    Chief Complaint: re evaluation of tachycardia/cough/PND, BP monitoring, PT/OT progression    HPI:   Ms. Young is a 89 y.o. female with a PMH of HTN, HLD, Diastolic dysfunction , sinus arrhythmia.  She was admitted to [facility] after a fall at home resulting in a closed fracture of ramus of right pubis with pelvic hematoma.  Orthopedics consulted, no surgical intervention recommended due to patient's age.  Patient also experienced tachycardia in the ED on admit, given 1 dose of metoprolol with resolution, cardiology does not think it was AFib and no further treatment was indicated.  Patient also had a drop in her H&H, fecal occult blood testing negative, started on iron, likely from pelvic hematoma after fall.  Therapy recommended skilled nursing for rehab prior to discharging home.  Patient came to facility with stage II pressure ulcer to right buttock, facility WC RN to follow closely.  Lab work completed on 01/03 revealed in H&H of 8/24, likely related to hematoma, albumin 3.3 with pre-albumin of 13-both low, need supplementation.  Changes to treatment plan ordered appropriately.    Patient will be treated at [facility] with PT and OT to improve functional status and ability to perform ADLs.     Interval History: /60, HR 79-improved since Metoprolol restarted during last visit on 1/6. Patient started on Claritin + Mucinex by MD on 1/9 for complaints of cough, PND, reporting improvement today. No complaints during visit today. Patient medically stable. Progressing slowly with PT/OT. Will continue to monitor and treat all chronic  conditions.     Past Medical History: Patient has a past medical history of CHF (congestive heart failure), Dyslipidemia, Hyperlipidemia, and Hypertension.    Past Surgical History: Patient has a past surgical history that includes Hysterectomy and tonsils.    Social History: Patient reports that she has never smoked. She has never used smokeless tobacco. She reports that she does not drink alcohol or use drugs.    Family History: family history includes Heart disease in her father.    Allergies: Patient has No Known Allergies.    ROS  Constitutional: Negative for fever or fatigue.   Eyes: Negative for blurred vision, double vision and discharge.   Respiratory: Negative for cough, shortness of breath and wheezing.    Cardiovascular: Negative for chest pain, palpitations, and leg swelling.   Gastrointestinal: Negative for abdominal pain, constipation, diarrhea, nausea and vomiting.   Genitourinary: Negative for dysuria, frequency and urgency.   Musculoskeletal:  + generalized weakness. Negative for back pain and myalgias.   Skin: Negative for itching and rash.   Neurological: Negative for dizziness, speech change, and headaches.   Psychiatric/Behavioral: Negative for depression. The patient is not nervous/anxious.      PEx     Constitutional: Patient appears well-developed and in no distress   Head: Normocephalic and atraumatic.   Eyes: Pupils are equal, round, and reactive to light.   Neck: Normal range of motion. Neck supple.   Cardiovascular: Normal rate, regular rhythm and normal heart sounds.    Pulmonary/Chest: Effort normal and breath sounds are clear  Abdominal: Soft. Bowel sounds are normal.   Musculoskeletal:  Decreased range of motion.   Neurological: Alert and oriented to person, place, and time.   Psychiatric: Normal mood and affect. Behavior is normal.   Skin: Skin is warm and dry, bruising on right side of body noted from fall, + stage II pressure ulcer to R buttock    Wound care evaluation completed  1/6/20:    Wound location: R buttock stage II pressure ulcer  Present on admit: yes  Appearance of wound:  100% epithelial   Drainage characteristic: None  Wound length: 0.71cm   Wound width: 0.54cm  Wound depth: 0.1cm   Peggy wound area: normal skin tone   Following: WC NP- weekly. This NP monthly- last observed on (1/6/20), wound care RN weekly- last observed on (1/6/20)      Assessment and Plan:    After care recent closed fracture of ramus of right pubis with pelvic hematoma, secondary to fall, ongoing  Debility 2/2 pelvic fx, ongoing  - Continue with PT/OT for gait training and strengthening and restoration of ADL's   - Encourage mobility, OOB in chair, and early ambulation as appropriate  - Fall precautions   - Monitor for bowel and bladder dysfunction  - Monitor for and prevent skin breakdown and pressure ulcers  - WBAT to RLE. No orthopaedic surgical intervention at this time  - pain control:  Tylenol 1000 mg scheduled, initiated tramadol 50 mg p.o. q.6 hours p.r.n. pain on 01/03 per reports of uncontrolled pain  - BM regimen:  Senna-Colace q.d.  - next follow-up appointment with Orthopedics scheduled for 1/7, will await recs  -1/13 Continue PT/OT    Remote history of hypertension with HLD, chronic  History of CHF with diastolic dysfunction, chronic, stable  -home meds:  HCTZ 12.5 mg q.d., metoprolol 25 mg q.d., hold if HR less than 60, Norvasc 10 mg q.d., gem fib resolved 600 mg p.o. b.i.d.,  q.d., MVI with minerals q.d.-not continued on DC from admit  -not on any medications for BP on dc from facility, controlled on diet  -continue simvastatin q.d.  -1/6 , likely due to beta-blocker being DC aide during admit, initiate metoprolol 25 mg p.o. q.d. with hold parameters of hold less than HR 60, initiate gemfibrozil 600 mg PO BID  -1/13 Continue Metoprolol, remaining cardiac regimen    Continued     Anemia due to blood loss, new  Secondary to fall and pelvic hematoma, ongoing  -H&H on admit trended  from 13-8 on discharge, patient started on iron, fecal occult blood test negative, bleeding likely from hematoma and fall  -continue ferrous sulfate p.o. Q.d., most recent H&H 8/24, will trend    Hyponatremia, new, stable  Hypokalemia, resolved  -NA on discharge 129, will trend on skilled  -NA on 01/31 33, improving, K 3.3 on admit to facility-initiated KCL 40 mEq p.o. x1 dose    Malnutrition, new  -albumin 2.9, pre-albumin 13 on admit to skilled facility  -initiate promod 30 mL p.o. B.i.d., initiate MVI with minerals     Recent history of tachycardia, rule out AFib, resolved  -tachycardia on admit in ED, thought to be AFib Cardiology reported no AFib, given metoprolol IV x1 dose, no further treatment indicated        No future appointments.         Carey Alcaraz NP          Patient note was created using MModal Dictation.  Any errors in syntax or even information may not have been identified and edited on initial review prior to signing this note.

## 2020-01-13 ENCOUNTER — DOCUMENTATION ONLY (OUTPATIENT)
Dept: HEPATOLOGY | Facility: HOSPITAL | Age: 85
End: 2020-01-13

## 2020-01-17 NOTE — PROGRESS NOTES
[facility]  Skilled Nursing Facility   Discharge Summary  Hospital Medicine        Admit Date:  01/02/2020  Date of Discharge:  1/20/20  Principal Problem:  Debility related to closed fracture of ramus of right pubis    HPI:   Ms. Young is a 89 y.o. female with a PMH of HTN, HLD, Diastolic dysfunction , sinus arrhythmia.  [place] [facility] [place] at [place] resulting in a closed fracture of ramus of right pubis with pelvic hematoma.  Orthopedics consulted, no surgical intervention recommended due to patient's age.  Patient also experienced tachycardia in the ED on admit, given 1 dose of metoprolol with resolution, cardiology does not think it was AFib and no further treatment was indicated.  Patient also had a drop in her H&H, fecal occult blood testing negative, started on iron, likely from pelvic hematoma after fall.  Therapy recommended skilled nursing for rehab prior to discharging home.  Patient came to facility with stage II pressure ulcer to right buttock, facility WC RN to follow closely.  Lab work completed on 01/03 revealed in H&H of 8/24, likely related to hematoma, albumin 3.3 with pre-albumin of 13-both low, need supplementation.  Changes to treatment plan ordered appropriately.    Hospital Course   Patient progressed well with PT and OT. Patient had no significant events during their stay at SNF. Home health was set up. DME was ordered if needed. Follow up appointment to be made by patient within one week. All prescriptions and discharge instructions were ordered to be given to the patient prior to discharge.     PEx     Constitutional: Patient appears well-developed and in no distress   Head: Normocephalic and atraumatic.   Eyes: Pupils are equal, round, and reactive to light.   Neck: Normal range of motion. Neck supple.   Cardiovascular: Normal rate, regular rhythm and normal heart sounds.    Pulmonary/Chest: Effort normal and breath sounds are clear  Abdominal: Soft. Bowel  sounds are normal.   Musculoskeletal:  Decreased range of motion.   Neurological: Alert and oriented to person, place, and time.   Psychiatric: Normal mood and affect. Behavior is normal.   Skin: Skin is warm and dry, bruising on right side of body noted from fall, + stage II pressure ulcer to R buttock      Discharge Diet:regular diet with Normal Fluid intake of 1500 - 2000 mL per day    Activity: activity as tolerated    Discharge Condition: Stable    Disposition: Home-Health Care Svc    Tests pending at the time of discharge: none     Assessment and Plan:    After care recent closed fracture of ramus of right pubis with pelvic hematoma, secondary to fall, ongoing  Debility 2/2 pelvic fx, ongoing  - Continue with PT/OT for gait training and strengthening and restoration of ADL's   - Encourage mobility, OOB in chair, and early ambulation as appropriate  - Fall precautions   - Monitor for bowel and bladder dysfunction  - Monitor for and prevent skin breakdown and pressure ulcers  - WBAT to RLE. No orthopaedic surgical intervention at this time  - pain control:  Tylenol 1000 mg scheduled, initiated tramadol 50 mg p.o. q.6 hours p.r.n. pain on 01/03 per reports of uncontrolled pain  - BM regimen:  Senna-Colace q.d.  - next follow-up appointment with Orthopedics scheduled for 1/7, will await recs  -1/20 Continue PT/OT    Remote history of hypertension with HLD, chronic  History of CHF with diastolic dysfunction, chronic, stable  -home meds:  HCTZ 12.5 mg q.d., metoprolol 25 mg q.d., hold if HR less than 60, Norvasc 10 mg q.d., gem fib resolved 600 mg p.o. b.i.d.,  q.d., MVI with minerals q.d.-not continued on DC from admit  -not on any medications for BP on dc from facility, controlled on diet  -continue simvastatin q.d.  -1/6 , likely due to beta-blocker being DC aide during admit, initiate metoprolol 25 mg p.o. q.d. with hold parameters of hold less than HR 60, initiate gemfibrozil 600 mg PO BID  -1/20  Continue Metoprolol, remaining cardiac regimen    Anemia due to blood loss, new  Secondary to fall and pelvic hematoma, ongoing  -H&H on admit trended from 13-8 on discharge, patient started on iron, fecal occult blood test negative, bleeding likely from hematoma and fall  -continue ferrous sulfate p.o. Q.d., most recent H&H 8/24, will trend    Hyponatremia, new, stable  Hypokalemia, resolved  -NA on discharge 129, will trend on skilled  -NA on 01/31 33, improving, K 3.3 on admit to facility-initiated KCL 40 mEq p.o. x1 dose    Malnutrition, new  -albumin 2.9, pre-albumin 13 on admit to skilled facility  -cont promod 30 mL p.o. B.i.d., initiate MVI with minerals     Recent history of tachycardia, rule out AFib, resolved  -tachycardia on admit in ED, thought to be AFib Cardiology reported no AFib, given metoprolol IV x1 dose, no further treatment indicated      No future appointments.       Time spent on the discharge of patient: 38 minutes.  Patient was seen and examined on the date of discharge and determined to be suitable for discharge.    Carey Alcaraz NP

## 2020-01-20 ENCOUNTER — DOCUMENTATION ONLY (OUTPATIENT)
Dept: HEPATOLOGY | Facility: HOSPITAL | Age: 85
End: 2020-01-20

## 2020-01-22 ENCOUNTER — TELEPHONE (OUTPATIENT)
Dept: ADMINISTRATIVE | Facility: OTHER | Age: 85
End: 2020-01-22

## 2020-01-22 PROCEDURE — G0180 MD CERTIFICATION HHA PATIENT: HCPCS | Mod: ,,, | Performed by: INTERNAL MEDICINE

## 2020-01-22 PROCEDURE — G0180 PR HOME HEALTH MD CERTIFICATION: ICD-10-PCS | Mod: ,,, | Performed by: INTERNAL MEDICINE

## 2020-01-27 ENCOUNTER — TELEPHONE (OUTPATIENT)
Dept: HOME HEALTH SERVICES | Facility: HOSPITAL | Age: 85
End: 2020-01-27

## 2020-01-30 ENCOUNTER — EXTERNAL HOME HEALTH (OUTPATIENT)
Dept: HOME HEALTH SERVICES | Facility: HOSPITAL | Age: 85
End: 2020-01-30
Payer: MEDICARE

## 2020-02-11 ENCOUNTER — TELEPHONE (OUTPATIENT)
Dept: PRIMARY CARE CLINIC | Facility: CLINIC | Age: 85
End: 2020-02-11

## 2020-02-11 NOTE — TELEPHONE ENCOUNTER
Patient's son called requesting an appointment one month ago. Is the patient in a nursing home or SNF now?    She is unfortunately not eligible for this clinic due to insurance status. I can get the [facility] mobile NPs involved if she is living in the home or an assisted living.    Thanks,  MICHELLE

## 2020-02-11 NOTE — TELEPHONE ENCOUNTER
----- Message from Jailyn Charles MA sent at 1/17/2020  9:20 AM CST -----  Contact: Mr Young/ son       ----- Message -----  From: Maryse Mota  Sent: 1/17/2020   8:55 AM CST  To: Tomi Tellez Staff    Type: Appointment Request        Name of Caller:   Mr Young need to speak with nurse requesting Dr Krishna to est care for patient  When is the first available appointment?  Symptoms:  Best Call Back Number:[phone]  Additional Information:

## 2020-02-11 NOTE — TELEPHONE ENCOUNTER
Spoke w/ pt son he stated that pt is going back to her doctor she will see him today he is a bit concerned because pt has depression and anxiety she cries a lot and she was at rehab with Luis she is all done and back home now

## 2020-10-07 ENCOUNTER — HOSPITAL ENCOUNTER (INPATIENT)
Facility: HOSPITAL | Age: 85
LOS: 2 days | Discharge: HOME | DRG: 669 | End: 2020-10-10
Attending: EMERGENCY MEDICINE | Admitting: EMERGENCY MEDICINE
Payer: MEDICARE

## 2020-10-07 DIAGNOSIS — N30.01 ACUTE CYSTITIS WITH HEMATURIA: Primary | ICD-10-CM

## 2020-10-07 DIAGNOSIS — R31.0 GROSS HEMATURIA: ICD-10-CM

## 2020-10-07 DIAGNOSIS — E87.1 HYPONATREMIA: ICD-10-CM

## 2020-10-07 DIAGNOSIS — D50.9 IRON DEFICIENCY ANEMIA, UNSPECIFIED IRON DEFICIENCY ANEMIA TYPE: ICD-10-CM

## 2020-10-07 DIAGNOSIS — R31.9 HEMATURIA: ICD-10-CM

## 2020-10-07 PROBLEM — N39.0 UTI (URINARY TRACT INFECTION): Status: ACTIVE | Noted: 2020-10-07

## 2020-10-07 LAB
ABO + RH BLD: NORMAL
ALBUMIN SERPL BCP-MCNC: 3.6 G/DL (ref 3.5–5.2)
ALP SERPL-CCNC: 122 U/L (ref 55–135)
ALT SERPL W/O P-5'-P-CCNC: 11 U/L (ref 10–44)
ANION GAP SERPL CALC-SCNC: 12 MMOL/L (ref 8–16)
ANION GAP SERPL CALC-SCNC: 15 MMOL/L (ref 8–16)
AST SERPL-CCNC: 21 U/L (ref 10–40)
BACTERIA #/AREA URNS AUTO: ABNORMAL /HPF
BASOPHILS # BLD AUTO: 0.04 K/UL (ref 0–0.2)
BASOPHILS NFR BLD: 0.6 % (ref 0–1.9)
BILIRUB SERPL-MCNC: 0.7 MG/DL (ref 0.1–1)
BILIRUB UR QL STRIP: NEGATIVE
BLD GP AB SCN CELLS X3 SERPL QL: NORMAL
BUN SERPL-MCNC: 18 MG/DL (ref 10–30)
BUN SERPL-MCNC: 21 MG/DL (ref 10–30)
CALCIUM SERPL-MCNC: 9.1 MG/DL (ref 8.7–10.5)
CALCIUM SERPL-MCNC: 9.8 MG/DL (ref 8.7–10.5)
CHLORIDE SERPL-SCNC: 90 MMOL/L (ref 95–110)
CHLORIDE SERPL-SCNC: 94 MMOL/L (ref 95–110)
CLARITY UR REFRACT.AUTO: ABNORMAL
CO2 SERPL-SCNC: 15 MMOL/L (ref 23–29)
CO2 SERPL-SCNC: 18 MMOL/L (ref 23–29)
COLOR UR AUTO: ABNORMAL
CREAT SERPL-MCNC: 0.6 MG/DL (ref 0.5–1.4)
CREAT SERPL-MCNC: 0.8 MG/DL (ref 0.5–1.4)
DIFFERENTIAL METHOD: ABNORMAL
EOSINOPHIL # BLD AUTO: 0.1 K/UL (ref 0–0.5)
EOSINOPHIL NFR BLD: 2.1 % (ref 0–8)
ERYTHROCYTE [DISTWIDTH] IN BLOOD BY AUTOMATED COUNT: 13.7 % (ref 11.5–14.5)
EST. GFR  (AFRICAN AMERICAN): >60 ML/MIN/1.73 M^2
EST. GFR  (AFRICAN AMERICAN): >60 ML/MIN/1.73 M^2
EST. GFR  (NON AFRICAN AMERICAN): >60 ML/MIN/1.73 M^2
EST. GFR  (NON AFRICAN AMERICAN): >60 ML/MIN/1.73 M^2
GLUCOSE SERPL-MCNC: 105 MG/DL (ref 70–110)
GLUCOSE SERPL-MCNC: 122 MG/DL (ref 70–110)
GLUCOSE UR QL STRIP: ABNORMAL
HCT VFR BLD AUTO: 36.1 % (ref 37–48.5)
HGB BLD-MCNC: 12.7 G/DL (ref 12–16)
HGB UR QL STRIP: ABNORMAL
HYALINE CASTS UR QL AUTO: 0 /LPF
IMM GRANULOCYTES # BLD AUTO: 0.03 K/UL (ref 0–0.04)
IMM GRANULOCYTES NFR BLD AUTO: 0.5 % (ref 0–0.5)
INR PPP: 0.9 (ref 0.8–1.2)
KETONES UR QL STRIP: NEGATIVE
LEUKOCYTE ESTERASE UR QL STRIP: ABNORMAL
LYMPHOCYTES # BLD AUTO: 1.7 K/UL (ref 1–4.8)
LYMPHOCYTES NFR BLD: 27.2 % (ref 18–48)
MCH RBC QN AUTO: 32.9 PG (ref 27–31)
MCHC RBC AUTO-ENTMCNC: 35.2 G/DL (ref 32–36)
MCV RBC AUTO: 94 FL (ref 82–98)
MICROSCOPIC COMMENT: ABNORMAL
MONOCYTES # BLD AUTO: 0.5 K/UL (ref 0.3–1)
MONOCYTES NFR BLD: 7.9 % (ref 4–15)
NEUTROPHILS # BLD AUTO: 3.9 K/UL (ref 1.8–7.7)
NEUTROPHILS NFR BLD: 61.7 % (ref 38–73)
NITRITE UR QL STRIP: NEGATIVE
NRBC BLD-RTO: 0 /100 WBC
PH UR STRIP: 6 [PH] (ref 5–8)
PLATELET # BLD AUTO: 338 K/UL (ref 150–350)
PMV BLD AUTO: 9.3 FL (ref 9.2–12.9)
POTASSIUM SERPL-SCNC: 3.2 MMOL/L (ref 3.5–5.1)
POTASSIUM SERPL-SCNC: 3.5 MMOL/L (ref 3.5–5.1)
PROT SERPL-MCNC: 7.3 G/DL (ref 6–8.4)
PROT UR QL STRIP: ABNORMAL
PROTHROMBIN TIME: 10.4 SEC (ref 9–12.5)
RBC # BLD AUTO: 3.86 M/UL (ref 4–5.4)
RBC #/AREA URNS AUTO: >100 /HPF (ref 0–4)
SARS-COV-2 RDRP RESP QL NAA+PROBE: NEGATIVE
SODIUM SERPL-SCNC: 121 MMOL/L (ref 136–145)
SODIUM SERPL-SCNC: 123 MMOL/L (ref 136–145)
SODIUM SERPL-SCNC: 127 MMOL/L (ref 136–145)
SODIUM SERPL-SCNC: 127 MMOL/L (ref 136–145)
SP GR UR STRIP: 1.01 (ref 1–1.03)
URN SPEC COLLECT METH UR: ABNORMAL
WBC # BLD AUTO: 6.29 K/UL (ref 3.9–12.7)
WBC #/AREA URNS AUTO: >100 /HPF (ref 0–5)
WBC CLUMPS UR QL AUTO: ABNORMAL

## 2020-10-07 PROCEDURE — 85025 COMPLETE CBC W/AUTO DIFF WBC: CPT

## 2020-10-07 PROCEDURE — 87086 URINE CULTURE/COLONY COUNT: CPT

## 2020-10-07 PROCEDURE — 99285 EMERGENCY DEPT VISIT HI MDM: CPT | Mod: 25

## 2020-10-07 PROCEDURE — 88112 PR  CYTOPATH, CELL ENHANCE TECH: ICD-10-PCS | Mod: 26,,, | Performed by: PATHOLOGY

## 2020-10-07 PROCEDURE — 88112 CYTOPATH CELL ENHANCE TECH: CPT | Performed by: PATHOLOGY

## 2020-10-07 PROCEDURE — 84295 ASSAY OF SERUM SODIUM: CPT | Mod: 91

## 2020-10-07 PROCEDURE — 99220 PR INITIAL OBSERVATION CARE,LEVL III: CPT | Mod: ,,, | Performed by: HOSPITALIST

## 2020-10-07 PROCEDURE — U0002 COVID-19 LAB TEST NON-CDC: HCPCS

## 2020-10-07 PROCEDURE — 86850 RBC ANTIBODY SCREEN: CPT

## 2020-10-07 PROCEDURE — 85610 PROTHROMBIN TIME: CPT

## 2020-10-07 PROCEDURE — 99222 1ST HOSP IP/OBS MODERATE 55: CPT | Mod: GC,,, | Performed by: UROLOGY

## 2020-10-07 PROCEDURE — 96361 HYDRATE IV INFUSION ADD-ON: CPT

## 2020-10-07 PROCEDURE — 81001 URINALYSIS AUTO W/SCOPE: CPT

## 2020-10-07 PROCEDURE — 25000003 PHARM REV CODE 250: Performed by: HOSPITALIST

## 2020-10-07 PROCEDURE — 99222 PR INITIAL HOSPITAL CARE,LEVL II: ICD-10-PCS | Mod: GC,,, | Performed by: UROLOGY

## 2020-10-07 PROCEDURE — G0378 HOSPITAL OBSERVATION PER HR: HCPCS

## 2020-10-07 PROCEDURE — 99284 EMERGENCY DEPT VISIT MOD MDM: CPT | Mod: ,,, | Performed by: EMERGENCY MEDICINE

## 2020-10-07 PROCEDURE — 96374 THER/PROPH/DIAG INJ IV PUSH: CPT

## 2020-10-07 PROCEDURE — 87184 SC STD DISK METHOD PER PLATE: CPT

## 2020-10-07 PROCEDURE — 80053 COMPREHEN METABOLIC PANEL: CPT

## 2020-10-07 PROCEDURE — 87077 CULTURE AEROBIC IDENTIFY: CPT

## 2020-10-07 PROCEDURE — 99220 PR INITIAL OBSERVATION CARE,LEVL III: ICD-10-PCS | Mod: ,,, | Performed by: HOSPITALIST

## 2020-10-07 PROCEDURE — 80048 BASIC METABOLIC PNL TOTAL CA: CPT

## 2020-10-07 PROCEDURE — 25000003 PHARM REV CODE 250: Performed by: STUDENT IN AN ORGANIZED HEALTH CARE EDUCATION/TRAINING PROGRAM

## 2020-10-07 PROCEDURE — 99284 PR EMERGENCY DEPT VISIT,LEVEL IV: ICD-10-PCS | Mod: ,,, | Performed by: EMERGENCY MEDICINE

## 2020-10-07 PROCEDURE — P9612 CATHETERIZE FOR URINE SPEC: HCPCS

## 2020-10-07 PROCEDURE — 63600175 PHARM REV CODE 636 W HCPCS: Performed by: EMERGENCY MEDICINE

## 2020-10-07 PROCEDURE — 36415 COLL VENOUS BLD VENIPUNCTURE: CPT

## 2020-10-07 PROCEDURE — 87186 SC STD MICRODIL/AGAR DIL: CPT

## 2020-10-07 PROCEDURE — 88112 CYTOPATH CELL ENHANCE TECH: CPT | Mod: 26,,, | Performed by: PATHOLOGY

## 2020-10-07 PROCEDURE — 87088 URINE BACTERIA CULTURE: CPT

## 2020-10-07 RX ORDER — OXYBUTYNIN CHLORIDE 5 MG/1
5 TABLET ORAL ONCE
Status: COMPLETED | OUTPATIENT
Start: 2020-10-07 | End: 2020-10-07

## 2020-10-07 RX ORDER — SODIUM CHLORIDE 0.9 % (FLUSH) 0.9 %
10 SYRINGE (ML) INJECTION
Status: DISCONTINUED | OUTPATIENT
Start: 2020-10-07 | End: 2020-10-10 | Stop reason: HOSPADM

## 2020-10-07 RX ORDER — SODIUM CHLORIDE 9 MG/ML
INJECTION, SOLUTION INTRAVENOUS CONTINUOUS
Status: DISCONTINUED | OUTPATIENT
Start: 2020-10-07 | End: 2020-10-10

## 2020-10-07 RX ORDER — CEFTRIAXONE 1 G/1
1 INJECTION, POWDER, FOR SOLUTION INTRAMUSCULAR; INTRAVENOUS
Status: DISCONTINUED | OUTPATIENT
Start: 2020-10-08 | End: 2020-10-09

## 2020-10-07 RX ORDER — POTASSIUM CHLORIDE 750 MG/1
30 CAPSULE, EXTENDED RELEASE ORAL ONCE
Status: COMPLETED | OUTPATIENT
Start: 2020-10-07 | End: 2020-10-07

## 2020-10-07 RX ORDER — IBUPROFEN 200 MG
24 TABLET ORAL
Status: DISCONTINUED | OUTPATIENT
Start: 2020-10-07 | End: 2020-10-10 | Stop reason: HOSPADM

## 2020-10-07 RX ORDER — CEFTRIAXONE 1 G/1
1 INJECTION, POWDER, FOR SOLUTION INTRAMUSCULAR; INTRAVENOUS
Status: COMPLETED | OUTPATIENT
Start: 2020-10-07 | End: 2020-10-07

## 2020-10-07 RX ADMIN — POTASSIUM CHLORIDE 30 MEQ: 750 CAPSULE, EXTENDED RELEASE ORAL at 03:10

## 2020-10-07 RX ADMIN — SODIUM CHLORIDE: 0.9 INJECTION, SOLUTION INTRAVENOUS at 03:10

## 2020-10-07 RX ADMIN — CEFTRIAXONE SODIUM 1 G: 1 INJECTION, POWDER, FOR SOLUTION INTRAMUSCULAR; INTRAVENOUS at 03:10

## 2020-10-07 RX ADMIN — OXYBUTYNIN CHLORIDE 5 MG: 5 TABLET ORAL at 10:10

## 2020-10-07 NOTE — ED PROVIDER NOTES
Encounter Date: 10/7/2020       History     Chief Complaint   Patient presents with    Vaginal Bleeding     Pt's son activated EMS for a concern of vaginal bleeding. Pt denies pain, weakness or dizziness. Hx of a historectomy in the 1960s.      89 yo W with pmhx HTN, HLD,CHF presents with a chief complaint of vaginal bleeding.  Patient has been suffering from bowel and urinary incontinence over the past month and has been using adult diapers.  One week prior, patient had an episode of bleeding in the diaper that quickly resolved.  She and her son are not exactly sure where the bleeding is coming from with a suspect vaginal.  Beginning yesterday, patient reports bleeding returned.  It has progressively worsened since last night.  Now decides bright red blood, there are clots.  Patient is on no anticoagulants or anti-platelet agents.  She reports having hysterectomy years prior.  No weakness or lightheadedness.  No abdominal pain.        Review of patient's allergies indicates:  No Known Allergies  Past Medical History:   Diagnosis Date    CHF (congestive heart failure)     Dyslipidemia     Hyperlipidemia     Hypertension      Past Surgical History:   Procedure Laterality Date    HYSTERECTOMY      tonsils       Family History   Problem Relation Age of Onset    Heart disease Father      Social History     Tobacco Use    Smoking status: Never Smoker    Smokeless tobacco: Never Used   Substance Use Topics    Alcohol use: No    Drug use: Never     Review of Systems   Constitutional: Negative for fever.   HENT: Negative for sore throat.    Respiratory: Negative for shortness of breath.    Cardiovascular: Negative for chest pain.   Gastrointestinal: Negative for blood in stool and nausea.   Genitourinary: Positive for hematuria and vaginal bleeding. Negative for dysuria, frequency, pelvic pain, vaginal discharge and vaginal pain.   Musculoskeletal: Negative for back pain.   Skin: Negative for rash.    Neurological: Negative for weakness.   Hematological: Does not bruise/bleed easily.       Physical Exam     Initial Vitals [10/07/20 0949]   BP Pulse Resp Temp SpO2   126/76 105 16 97.6 °F (36.4 °C) 98 %      MAP       --         Physical Exam    Nursing note and vitals reviewed.  Constitutional: She appears well-developed and well-nourished. She is not diaphoretic. No distress.   HENT:   Head: Normocephalic and atraumatic.   Eyes: Right eye exhibits no discharge. Left eye exhibits no discharge. No scleral icterus.   Neck: Normal range of motion. Neck supple. No JVD present.   Cardiovascular: Normal rate, regular rhythm, normal heart sounds and intact distal pulses. Exam reveals no gallop and no friction rub.    No murmur heard.  Regular rate on my auscultation   Pulmonary/Chest: Breath sounds normal. No respiratory distress. She has no wheezes. She has no rhonchi. She has no rales. She exhibits no tenderness.   Abdominal: Soft. Bowel sounds are normal. She exhibits no distension and no mass. There is no abdominal tenderness. There is no rebound and no guarding.   No CVA tenderness to percussion   Genitourinary: Rectum:      No abnormal anal tone.      Pelvic exam was performed with patient supine.   There is no tenderness or injury on the right labia. There is no tenderness or injury on the left labia. Right adnexum displays no mass and no tenderness. Left adnexum displays no mass and no tenderness.    Vaginal bleeding present.   There is bleeding in the vagina.    Genitourinary Comments: Vaginal cuff evaluated with speculum without visible cervix  There is blood pooling in the vault that did not clear with 2 swabs  There is dried blood and clots in the patient's diaper  No BRBPR     Musculoskeletal: Normal range of motion. No tenderness or edema.   Neurological: She is alert and oriented to person, place, and time. She has normal strength. No sensory deficit.   Skin: Skin is warm and dry. Capillary refill takes  less than 2 seconds.   Psychiatric: She has a normal mood and affect.         ED Course   Procedures  Labs Reviewed   CBC W/ AUTO DIFFERENTIAL - Abnormal; Notable for the following components:       Result Value    RBC 3.86 (*)     Hematocrit 36.1 (*)     Mean Corpuscular Hemoglobin 32.9 (*)     All other components within normal limits   COMPREHENSIVE METABOLIC PANEL - Abnormal; Notable for the following components:    Sodium 123 (*)     Potassium 3.2 (*)     Chloride 90 (*)     CO2 18 (*)     Glucose 122 (*)     All other components within normal limits   URINALYSIS, REFLEX TO URINE CULTURE - Abnormal; Notable for the following components:    Color, UA Red (*)     Appearance, UA Cloudy (*)     Protein, UA 2+ (*)     Glucose, UA 1+ (*)     Occult Blood UA 3+ (*)     Leukocytes, UA 2+ (*)     All other components within normal limits    Narrative:     Specimen Source->Urine   URINALYSIS MICROSCOPIC - Abnormal; Notable for the following components:    RBC, UA >100 (*)     WBC, UA >100 (*)     WBC Clumps, UA Many (*)     Bacteria Few (*)     All other components within normal limits    Narrative:     Specimen Source->Urine   CULTURE, URINE   PROTIME-INR   TYPE & SCREEN          Imaging Results          US Pelvis Complete Non OB (In process)                US Retroperitoneal Limited (In process)                  Medical Decision Making:   History:   Old Medical Records: I decided to obtain old medical records.  Initial Assessment:   89 yo W with pmhx HTN, HLD,CHF presents with a chief complaint of vaginal bleeding.  Pelvic exam reveals bright red blood in vault with no rectal bleeding.  Differential Diagnosis:   Malignancy, hematuria, cystitis, nephrolithiasis, anemia, coagulopathy  Clinical Tests:   Lab Tests: Ordered  Radiological Study: Ordered  ED Management:  Will obtain labs and pelvic ultrasound.  Patient reportedly had a hysterectomy but uncertain as to what degree.     Reassessment:  Patient is incontinent.   Straight cath performed by RN reveals dark, bloody urine.  Hematuria the likely etiology of this vaginal bleeding.  Will place Oliver and irrigate bladder.  Will keep urinary catheter in place for transabdominal ultrasound CBCs with hemoglobin 12.7 which is much increased from her baseline of 8.    Reassessment:  Urinalysis indicates UTI.  There is 3+ blood and many WBC clumps and greater than 100 wbc's.  Will send culture and treat with 1 g of ceftriaxone.  CMP with hyponatremia of 123.  Normal creatinine.  Hyponatremia appears new for the patient.  Given severity of hyponatremia, patient will require further evaluation and management.  Will administer 1 L bolus normal saline.  Awaiting ultrasound of kidneys and pelvis. Obs per case management.                              Clinical Impression:     ICD-10-CM ICD-9-CM   1. Acute cystitis with hematuria  N30.01 595.0   2. Hematuria  R31.9 599.70   3. Hyponatremia  E87.1 276.1                          ED Disposition Condition    Observation                             Lm Leger MD  10/07/20 2622

## 2020-10-07 NOTE — UM SECONDARY REVIEW
Physician Advisor External    Level of Care Issue    Approved Observation     [facility]  [employer] Medical Necessity Recommendation  The information in this document is a recommendation to be used for utilization review and utilization management purposes only. This recommendation is not an order. The recommendation is made  based on the information reviewed by [facility] at the time of the referral, is pursuant to the Medicare Hospital Conditions of Participation (42 CFR Part 482), and is neither a judgment  nor an assessment with regard to the appropriateness or quality of clinical care. Nothing in this document may be used to limit, alter, or affect clinical services provided to the above named patient. The  definitions of Inpatient and Outpatient used in making the recommendation above are those provided in the Hospital Inpatient Prospective Payment System FY 2014 (78 FR 88541), the Hospital  Outpatient Prospective Payment System, Calendar Year 2016 (FR 88462), the Medicare Benefit Policy Manual (100-02), Chapter 1 Sections 1 and 10, Chapter 6 Section 20, and the Medicare Claim  Processing Manual (100-04), Chapter 1 Section 50.3. This recommendation should be used as one part of the process utilized to ensure compliance with CMS policy regarding Inpatient Admissions and  Outpatient care and should be considered as only one of several factors in determining the patients appropriate final level of service, including other pertinent documentation such as the treating  physicians order and other documentation required pursuant to 42 .3 and 42  et seq., as applicable. The provider of services is ultimately responsible for the submission of a claim that  has met all requirements for correct coding, billing, and reimbursement.  If this patients primary payor is Medicare and the patients initial physician order was for inpatient, Condition Code 44 may be  appropriate if all requirements for Condition Code 44 are met. For outpatient  recommendations, observation services may have been appropriate at the time of presentation.  For readmissions within 30 days of discharge from the same hospital, we recommend the hospital refer to its internal utilization review, quality, and/or peer review processes in accordance with its internal  protocols as this recommendation does not constitute a finding regarding the quality of care provided.  Part of the Permanent Record  Page 1 of 1 Name: Elisha Young  Acct #: [id number]  Patient Name: Elisha Young Account Number: [id number]  Age/Gender: 89 / Female Admit Date: 10/07/2020  Primary Insurance: Medicare Date Received: 10/07/2020  Attending Physician: Carmen Martino [facility] Physician Advisor: Amber Shook MD  10/07/2020 5:38PM  [phone]  The order for [facility] Review: Observation  This recommendation is based upon review of the clinical information provided to [facility] [employer] [facility] as of  the date of this recommendation.  Recommendation Summary   10/07/2020: Outpatient  Recommendation for 10/07/2020  Supporting Clinical Factors:  ? No respiratory instability  The Attending Physician Concern:  The concerns of the treating physician are for acute cystitis with hematuria, hyponatremia and hematuria.  Rationale:  Outpatient services are appropriate for this patient to further evaluate hematuria with hyponatremia (123), but without  significant anemia, no leukocytosis and fever, no tachypnea, hypoxia, mentation change, or documentation for  emergent surgical intervention.  Plan of Care Includes:  The plan of care included IV fluid, ceftriaxone, awaiting ultrasound of kidneys and pelvis, urinary catheter, and further  management based on clinical course.

## 2020-10-07 NOTE — MEDICAL/APP STUDENT
History     Chief Complaint   Patient presents with    Vaginal Bleeding     Pt's son activated EMS for a concern of vaginal bleeding. Pt denies pain, weakness or dizziness. Hx of a historectomy in the 1960s.      Elisha Ye is a 89 yo female with history of HTN, HLD, sinus arrhythmia, total Hysterectomy in late 1960's,  and a pelvic fracture treated with rehab 12/2019 who presented with vaginal bleeding. She had an episode of vaginal bleeding last week that resolved on its own and lasted less than a day. Yesterday she had began having vaginal bleeding again that increased throughout the night and this morning. There does appear to be clots in the bleeding. Pt has urinary incontinence and has been on adult diapers during rehab since her fracture in December as well as for the last month because of incontinence at night. She is unsure if bleeding is from the urine or vagina due to incontinence.          Past Medical History:   Diagnosis Date    CHF (congestive heart failure)     Dyslipidemia     Hyperlipidemia     Hypertension        Past Surgical History:   Procedure Laterality Date    HYSTERECTOMY      tonsils         Family History   Problem Relation Age of Onset    Heart disease Father        Social History     Tobacco Use    Smoking status: Never Smoker    Smokeless tobacco: Never Used   Substance Use Topics    Alcohol use: No    Drug use: Never       Review of Systems   Constitutional: Negative for fatigue and fever.   Respiratory: Negative for cough and shortness of breath.    Cardiovascular: Negative for chest pain and leg swelling.   Gastrointestinal: Negative for diarrhea, nausea and vomiting.   Genitourinary: Positive for hematuria, urgency and vaginal bleeding. Negative for dysuria.   Musculoskeletal: Negative for joint swelling and myalgias.   Skin: Negative for color change and rash.   Neurological: Negative for dizziness and headaches.   Psychiatric/Behavioral: Negative for agitation  "and confusion.       Physical Exam   /76 (BP Location: Left arm, Patient Position: Sitting)   Pulse 105   Temp 97.6 °F (36.4 °C) (Oral)   Resp 16   Ht 5' 2" (1.575 m)   Wt 49.9 kg (110 lb)   SpO2 98%   BMI 20.12 kg/m²     Physical Exam    Constitutional: She appears well-developed and well-nourished.   HENT:   Head: Normocephalic and atraumatic.   Eyes: Conjunctivae and EOM are normal.   Neck: Normal range of motion. Neck supple.   Cardiovascular: Normal rate and regular rhythm.   Pulmonary/Chest: Breath sounds normal.   Abdominal: Soft. Bowel sounds are normal.   Genitourinary:    Genitourinary Comments: Pelvic exam performed and bleeding source appears to be vaginal.     Musculoskeletal: Normal range of motion.   Neurological: She is alert and oriented to person, place, and time. She has normal reflexes.   Skin: Skin is warm and dry.   Psychiatric: She has a normal mood and affect. Her behavior is normal. Judgment and thought content normal.         ED Course         "

## 2020-10-07 NOTE — ED NOTES
Pt identifiers checked and accurate with Elisha Young     Pt reports to ED via EMS from home for vaginal bleeding beginning yesterday. Pt son reports patient confused as to day of the week, loss of urine and bowel control x 1.5 weeks. Pt reports vaginal spotting yesterday and increased bleeding today. Pt denies fever, chills, cough, abdominal pain, N/V/D, pain.     LOC: The patient is awake, alert and aware of environment with an appropriate affect, the patient is oriented x 3 and speaking appropriately.  APPEARANCE: Patient resting comfortably and in no acute distress, patient is clean and well groomed, pt placed in hospital gown.   SKIN: The skin is warm and dry, color consistent with ethnicity, patient has normal skin turgor and moist mucus membranes, skin intact.  MUSCULOSKELETAL: Patient moving all extremities well, no obvious swelling or deformities noted.   RESPIRATORY: Airway is open and paten; respirations are spontaneous, patient has a normal effort and rate  CARDIAC: Patient has no peripheral edema noted. No complaints of chest pain at this time.   ABDOMEN: Soft and non tender to palpation, no distention noted. Bowel sounds present x 4  NEUROLOGIC: eyes open spontaneously, behavior appropriate to situation, follows commands, facial expression symmetrical, purposeful motor response noted, normal sensation in all extremities when touched with a finger.  : Pt reports bleeding from vagina beginning yesterday, denies pain. Pt son reports pt uses diapers at night and has increased use over past 2 weeks for incontinence.

## 2020-10-08 LAB
ANION GAP SERPL CALC-SCNC: 8 MMOL/L (ref 8–16)
BASOPHILS # BLD AUTO: 0.07 K/UL (ref 0–0.2)
BASOPHILS NFR BLD: 1 % (ref 0–1.9)
BUN SERPL-MCNC: 13 MG/DL (ref 10–30)
CALCIUM SERPL-MCNC: 8.4 MG/DL (ref 8.7–10.5)
CHLORIDE SERPL-SCNC: 101 MMOL/L (ref 95–110)
CO2 SERPL-SCNC: 15 MMOL/L (ref 23–29)
CREAT SERPL-MCNC: 0.6 MG/DL (ref 0.5–1.4)
DIFFERENTIAL METHOD: ABNORMAL
EOSINOPHIL # BLD AUTO: 0.3 K/UL (ref 0–0.5)
EOSINOPHIL NFR BLD: 5 % (ref 0–8)
ERYTHROCYTE [DISTWIDTH] IN BLOOD BY AUTOMATED COUNT: 14.2 % (ref 11.5–14.5)
EST. GFR  (AFRICAN AMERICAN): >60 ML/MIN/1.73 M^2
EST. GFR  (NON AFRICAN AMERICAN): >60 ML/MIN/1.73 M^2
GLUCOSE SERPL-MCNC: 107 MG/DL (ref 70–110)
HCT VFR BLD AUTO: 33.3 % (ref 37–48.5)
HGB BLD-MCNC: 11.7 G/DL (ref 12–16)
IMM GRANULOCYTES # BLD AUTO: 0.08 K/UL (ref 0–0.04)
IMM GRANULOCYTES NFR BLD AUTO: 1.2 % (ref 0–0.5)
LYMPHOCYTES # BLD AUTO: 2.8 K/UL (ref 1–4.8)
LYMPHOCYTES NFR BLD: 40.9 % (ref 18–48)
MCH RBC QN AUTO: 33.5 PG (ref 27–31)
MCHC RBC AUTO-ENTMCNC: 35.1 G/DL (ref 32–36)
MCV RBC AUTO: 95 FL (ref 82–98)
MONOCYTES # BLD AUTO: 0.6 K/UL (ref 0.3–1)
MONOCYTES NFR BLD: 9.4 % (ref 4–15)
NEUTROPHILS # BLD AUTO: 2.9 K/UL (ref 1.8–7.7)
NEUTROPHILS NFR BLD: 42.5 % (ref 38–73)
NRBC BLD-RTO: 0 /100 WBC
PLATELET # BLD AUTO: 289 K/UL (ref 150–350)
PMV BLD AUTO: 9.4 FL (ref 9.2–12.9)
POTASSIUM SERPL-SCNC: 4.5 MMOL/L (ref 3.5–5.1)
RBC # BLD AUTO: 3.49 M/UL (ref 4–5.4)
SODIUM SERPL-SCNC: 124 MMOL/L (ref 136–145)
SODIUM SERPL-SCNC: 126 MMOL/L (ref 136–145)
SODIUM SERPL-SCNC: 127 MMOL/L (ref 136–145)
SODIUM SERPL-SCNC: 128 MMOL/L (ref 136–145)
WBC # BLD AUTO: 6.8 K/UL (ref 3.9–12.7)

## 2020-10-08 PROCEDURE — 97530 THERAPEUTIC ACTIVITIES: CPT

## 2020-10-08 PROCEDURE — 63600175 PHARM REV CODE 636 W HCPCS: Performed by: HOSPITALIST

## 2020-10-08 PROCEDURE — 97162 PT EVAL MOD COMPLEX 30 MIN: CPT

## 2020-10-08 PROCEDURE — 11000001 HC ACUTE MED/SURG PRIVATE ROOM

## 2020-10-08 PROCEDURE — 25000003 PHARM REV CODE 250: Performed by: HOSPITALIST

## 2020-10-08 PROCEDURE — 80048 BASIC METABOLIC PNL TOTAL CA: CPT

## 2020-10-08 PROCEDURE — 97165 OT EVAL LOW COMPLEX 30 MIN: CPT

## 2020-10-08 PROCEDURE — 84295 ASSAY OF SERUM SODIUM: CPT | Mod: 91

## 2020-10-08 PROCEDURE — 63600175 PHARM REV CODE 636 W HCPCS: Performed by: STUDENT IN AN ORGANIZED HEALTH CARE EDUCATION/TRAINING PROGRAM

## 2020-10-08 PROCEDURE — 96361 HYDRATE IV INFUSION ADD-ON: CPT

## 2020-10-08 PROCEDURE — 25000003 PHARM REV CODE 250: Performed by: STUDENT IN AN ORGANIZED HEALTH CARE EDUCATION/TRAINING PROGRAM

## 2020-10-08 PROCEDURE — 99233 PR SUBSEQUENT HOSPITAL CARE,LEVL III: ICD-10-PCS | Mod: ,,, | Performed by: STUDENT IN AN ORGANIZED HEALTH CARE EDUCATION/TRAINING PROGRAM

## 2020-10-08 PROCEDURE — 85025 COMPLETE CBC W/AUTO DIFF WBC: CPT

## 2020-10-08 PROCEDURE — 36415 COLL VENOUS BLD VENIPUNCTURE: CPT

## 2020-10-08 PROCEDURE — 99233 SBSQ HOSP IP/OBS HIGH 50: CPT | Mod: ,,, | Performed by: STUDENT IN AN ORGANIZED HEALTH CARE EDUCATION/TRAINING PROGRAM

## 2020-10-08 RX ORDER — HEPARIN SODIUM 5000 [USP'U]/ML
5000 INJECTION, SOLUTION INTRAVENOUS; SUBCUTANEOUS EVERY 8 HOURS
Status: DISCONTINUED | OUTPATIENT
Start: 2020-10-08 | End: 2020-10-08

## 2020-10-08 RX ORDER — AMLODIPINE BESYLATE 10 MG/1
10 TABLET ORAL DAILY
Status: ON HOLD | COMMUNITY
End: 2020-10-10 | Stop reason: HOSPADM

## 2020-10-08 RX ORDER — HYDROCHLOROTHIAZIDE 12.5 MG/1
12.5 CAPSULE ORAL DAILY
Status: ON HOLD | COMMUNITY
End: 2020-10-10 | Stop reason: HOSPADM

## 2020-10-08 RX ORDER — METOPROLOL TARTRATE 25 MG/1
25 TABLET, FILM COATED ORAL DAILY
Status: ON HOLD | COMMUNITY
End: 2020-10-10 | Stop reason: HOSPADM

## 2020-10-08 RX ORDER — GEMFIBROZIL 600 MG/1
600 TABLET, FILM COATED ORAL
Status: DISCONTINUED | OUTPATIENT
Start: 2020-10-08 | End: 2020-10-10 | Stop reason: HOSPADM

## 2020-10-08 RX ORDER — SIMVASTATIN 20 MG/1
20 TABLET, FILM COATED ORAL NIGHTLY
Status: DISCONTINUED | OUTPATIENT
Start: 2020-10-08 | End: 2020-10-10 | Stop reason: HOSPADM

## 2020-10-08 RX ORDER — GEMFIBROZIL 600 MG/1
600 TABLET, FILM COATED ORAL
COMMUNITY
End: 2021-01-01

## 2020-10-08 RX ADMIN — CEFTRIAXONE SODIUM 1 G: 1 INJECTION, POWDER, FOR SOLUTION INTRAMUSCULAR; INTRAVENOUS at 03:10

## 2020-10-08 RX ADMIN — SODIUM CHLORIDE: 0.9 INJECTION, SOLUTION INTRAVENOUS at 01:10

## 2020-10-08 RX ADMIN — SODIUM CHLORIDE: 0.9 INJECTION, SOLUTION INTRAVENOUS at 02:10

## 2020-10-08 RX ADMIN — GEMFIBROZIL 600 MG: 600 TABLET ORAL at 03:10

## 2020-10-08 RX ADMIN — HEPARIN SODIUM 5000 UNITS: 5000 INJECTION INTRAVENOUS; SUBCUTANEOUS at 03:10

## 2020-10-08 RX ADMIN — SIMVASTATIN 20 MG: 20 TABLET, FILM COATED ORAL at 08:10

## 2020-10-08 NOTE — PT/OT/SLP EVAL
Occupational Therapy   Evaluation    Name: Elisha Young  MRN: [id number]  Admitting Diagnosis:  Hyponatremia      Recommendations:     Discharge Recommendations: home health PT, home health OT  Discharge Equipment Recommendations:  (TBD pending progress)  Barriers to discharge:  None    Assessment:     Elisha Young is a 89 y.o. female with a medical diagnosis of Hyponatremia.  She presents with the following performance deficits affecting function: weakness, impaired endurance, impaired self care skills, gait instability, impaired functional mobilty, impaired balance, decreased coordination, decreased upper extremity function. Pt tolerated therapy session well this date and was AxOx4. Pt completed bed mobility with SBA requiring increased time to complete, x3 trials sit<>stand transfer from EOB with min A with RW, and stand pivot transfer to bedside chair with min A with RW.  At this time, OT is recommending pt d/c back home with home health OT/PT services due to pt's increased assistance required to complete ADLs and functional mobility safely. Pt should continue receiving skilled OT services to promote independence and safety during completion of functional mobility and ADL activities.        Rehab Prognosis: Good; patient would benefit from acute skilled OT services to address these deficits and reach maximum level of function.       Plan:     Patient to be seen 3 x/week to address the above listed problems via self-care/home management, therapeutic activities, therapeutic exercises  · Plan of Care Expires: 11/06/20  · Plan of Care Reviewed with: patient    Subjective     Chief Complaint: Pain in RLE  Patient/Family Comments/goals: Return home and to PLOF    Occupational Profile:  Living Environment: Pt lives in duplex alone with son on other side of duplex. Pt has 5 YULY with BHR. Pt has tub/shower combo with grab bars and a shower chair.   Previous level of function: PTA, pt was using rollator for functional  mobility and required assistance for meal prep and bathing. Pt independent with all other ADLs.  Roles and Routines: Mother, Retiree  Equipment Used at Home:  grab bar, shower chair, walker, rolling, rollator, bedside commode, cane, straight  Assistance upon Discharge: Pt will have assistance from son at night (son works during the day) and assistance from neighbors during the day as needed.     Pain/Comfort:  · Pain Rating 1: (Pain in RLE. Pt did not rate pain)  · Pain Addressed 1: Distraction    Patients cultural, spiritual, Mormonism conflicts given the current situation: no    Objective:     Communicated with: RN prior to session.  Patient found supine with peripheral IV, telemetry upon OT entry to room. Pt agreeable to therapy session.    General Precautions: Standard, fall   Orthopedic Precautions:N/A   Braces: N/A     Occupational Performance:    Bed Mobility:    · Patient completed Scooting/Bridging anteriorly towards EOB with stand by assistance  · Required increased time for completion  · Patient completed Supine to Sit with stand by assistance  · Required increased time for completion    Functional Mobility/Transfers:  · Patient completed x3 trials Sit <> Stand Transfer from EOB with minimum assistance with  olling walker   · Verbal cues provided for upright posture   · Patient completed Bed > Chair Transfer using Stand pivot technique to R with minimum assistance with rolling walker  · Assistance provided to guide hips to chair     Activities of Daily Living:  · Upper Body Dressing: stand by assistance with set-up A to don hospital gown like a robe  · Assistance provided for threading BUE  · Lower Body Dressing: stand by assistance to simulate donning/doffing B  socks while sitting unsuported at EOB     Cognitive/Visual Perceptual:  Cognitive/Psychosocial Skills:     -       Oriented to: Person, Place, Time and Situation   -       Follows Commands/attention:Follows multistep  commands  -        Communication: clear/fluent  -       Memory: No Deficits noted  -       Safety awareness/insight to disability: intact   -       Mood/Affect/Coping skills/emotional control: Appropriate to situation, Cooperative and Pleasant  Visual/Perceptual:      -Intact     Physical Exam:  Balance:     Static sit: SBA   Dynamic sit: CGA   Static standing: min A  Postural examination/scapula alignment:    -       Rounded shoulders  -       Kyphosis  Skin integrity: Thin  Edema:  None noted  Sensation:    -       Intact  Dominant hand:    -       Right  Upper Extremity Range of Motion:     -       Right Upper Extremity: WFL  -       Left Upper Extremity: WFL  Upper Extremity Strength:    -       Right Upper Extremity: 3+/5 shoulder flexion, 4/5 bicep flexion/extension  -       Left Upper Extremity: 3+/5 shoulder flexion. 4/5 bicep flexion/extension   Strength:    -       Right Upper Extremity: WFL  -       Left Upper Extremity: WFL  Fine Motor Coordination:    -       Intact    AMPAC 6 Click ADL:  AMPAC Total Score: 16    Treatment & Education:  -Pt educated on role of OT, POC, and goals for therapy  -Pt educated on importance of OOB activities to improve activity tolerance to maintain functional strength  -Pt is appropriate to transfer with nsg staff and PCT via stand pivot transfer with min A with RW  -Pt verbalized understanding and expressed no further questions or concerns  -Whiteboard updated   Education:    Patient left up in chair with all lines intact, call button in reach and RN notified    GOALS:   Multidisciplinary Problems     Occupational Therapy Goals        Problem: Occupational Therapy Goal    Goal Priority Disciplines Outcome Interventions   Occupational Therapy Goal     OT, PT/OT Ongoing, Progressing    Description: Goals to be met by: 10/22/2020    Patient will increase functional independence with ADLs by performing:    UE Dressing with Supervision.  LE Dressing with Supervision.  Grooming while  standing with Supervision.  Toileting from bedside commode with Supervision for hygiene and clothing management.   Step transfer with Supervision  Toilet transfer to bedside commode with Supervision.                     History:     Past Medical History:   Diagnosis Date    CHF (congestive heart failure)     Dyslipidemia     Hyperlipidemia     Hypertension        Past Surgical History:   Procedure Laterality Date    HYSTERECTOMY      tonsils         Time Tracking:     OT Date of Treatment: 10/08/20  OT Start Time: 1058  OT Stop Time: 1124  OT Total Time (min): 26 min (Co-eval/treat with PT)    Billable Minutes:Evaluation 10  Therapeutic Activity 8    FIDE Cortez  10/8/2020

## 2020-10-08 NOTE — CONSULTS
[facility]  Urology  Consult Note    Patient Name: Elisha Young  MRN: [id number]  Admission Date: 10/7/2020  Hospital Length of Stay: 0   Code Status: Prior   Attending Provider: Carmen Martino MD   Consulting Provider: Nayla Campbell MD  Primary Care Physician: Marky García MD  Principal Problem:Hyponatremia    Inpatient consult to Urology  Consult performed by: Nayla Campbell MD  Consult ordered by: Carmen Martino MD          Subjective:     HPI:  This is a 89 YOF with hx of HTN present to the ER with painless gross hematuria with clots that began yesterday. No suprapubic pain, dysuria, increased frequency, or urgency. She has been incontinent of urine and stool for the past couple months. She goes through multiple diapers daily. A ramos catheter was placed in the ER and multiple clots irrigated out.     On evaluation, she is afebrile with stable vitals. No distress. UA with >100W, >100R, few bacteria, nit neg. Cr at baseline. RP US with no hydronephrosis. A 16 Fr ramos catheter was in place draining bloody urine.    She is not on anticoagulation. No smoking history. She reports having hysterectomy many years ago. No personal or family history of  malignancy.     Past Medical History:   Diagnosis Date    CHF (congestive heart failure)     Dyslipidemia     Hyperlipidemia     Hypertension        Past Surgical History:   Procedure Laterality Date    HYSTERECTOMY      tonsils         Review of patient's allergies indicates:  No Known Allergies    Family History     Problem Relation (Age of Onset)    Heart disease Father          Tobacco Use    Smoking status: Never Smoker    Smokeless tobacco: Never Used   Substance and Sexual Activity    Alcohol use: No    Drug use: Never    Sexual activity: Not on file       Review of Systems   Constitutional: Negative for activity change, appetite change, fatigue and fever.   HENT: Negative for facial swelling and hearing loss.    Eyes: Negative  for discharge and itching.   Respiratory: Negative for chest tightness and shortness of breath.    Cardiovascular: Negative for chest pain and leg swelling.   Gastrointestinal: Negative for abdominal distention, abdominal pain, constipation, diarrhea, nausea and vomiting.   Genitourinary: Positive for hematuria. Negative for decreased urine volume, difficulty urinating, dysuria, flank pain, frequency, urgency and vaginal bleeding.   Musculoskeletal: Negative for arthralgias, back pain and neck pain.   Skin: Negative for color change and pallor.   Neurological: Negative for dizziness, facial asymmetry and light-headedness.   Hematological: Negative for adenopathy.   Psychiatric/Behavioral: Negative for agitation and decreased concentration. The patient is not nervous/anxious.        Objective:     Temp:  [97.6 °F (36.4 °C)] 97.6 °F (36.4 °C)  Pulse:  [] 97  Resp:  [16-20] 18  SpO2:  [96 %-99 %] 97 %  BP: (111-134)/(57-76) 117/61     Body mass index is 20.12 kg/m².           Drains     Drain                 Urethral Catheter 10/07/20 1216 Double-lumen 16 Fr. less than 1 day                Physical Exam   Constitutional: She is oriented to person, place, and time. She appears well-developed. No distress.   HENT:   Head: Normocephalic and atraumatic.   Eyes: No scleral icterus.   Neck: No tracheal deviation present.   Cardiovascular: Normal rate.    Pulmonary/Chest: Effort normal. No respiratory distress.   Abdominal: Soft. She exhibits no distension. There is no abdominal tenderness.   Genitourinary:    Genitourinary Comments: Oliver catheter in place      Musculoskeletal: Normal range of motion.   Neurological: She is alert and oriented to person, place, and time.   Skin: Skin is warm and dry.     Psychiatric: Her behavior is normal.       Significant Labs:    BMP:  Recent Labs   Lab 10/07/20  1105 10/07/20  1701   * 121*   K 3.2* 3.5   CL 90* 94*   CO2 18* 15*   BUN 21 18   CREATININE 0.8 0.6   CALCIUM 9.8  9.1       CBC:  Recent Labs   Lab 10/07/20  1105   WBC 6.29   HGB 12.7   HCT 36.1*          All pertinent labs results from the past 24 hours have been reviewed.    Significant Imaging:  U/S: I have reviewed all results within the past 24 hours and my personal findings are:  no hydronephrosis bilaterally, echogenic focus in the bladder could be tumor vs debris vs clot                     Assessment and Plan:     UTI (urinary tract infection)  - follow urine culture   - continue empiric rocephin    Gross hematuria  Ramos catheter up-sized to 24 Fr and irrigated easily to clear with return of many clots   - ramos catheter left in place  - urine cytology ordered  - ramos check in the morning, depending on appearance, may do inpatient work up       VTE Risk Mitigation (From admission, onward)         Ordered     IP VTE HIGH RISK PATIENT  Once      10/07/20 1509     Place sequential compression device  Until discontinued      10/07/20 1509                Thank you for your consult. I will follow-up with patient. Please contact us if you have any additional questions.    Nayla Campbell MD  Urology  [facility]    Patient seen on rounds.  Agree with the above note.  Noted to have gross hematuria.  Will await the culture results before performing cystoscopy, possible bladder biopsy fulguration and retrograde pyelograms.  Alternatively, this can be done as an outpatient.  Will be happy to accommodate either way.

## 2020-10-08 NOTE — PT/OT/SLP EVAL
Physical Therapy Evaluation    Patient Name:  Elisha Young   MRN:  [id number]    Recommendations:     Discharge Recommendations:  home health PT   Discharge Equipment Recommendations: none   Barriers to discharge: None    Assessment:     Elisha Young is a 89 y.o. female admitted with a medical diagnosis of Hyponatremia.  She presents with the following impairments/functional limitations:  weakness, impaired endurance, impaired functional mobilty, gait instability, impaired balance, impaired self care skills, decreased lower extremity function Patient tolerated initial evaluation well. Patient completed bed mobility with SBA and increased time. Patient completed sit to stand transfer and bed > chair transfer with Geovanni and RW. Pt is limited in distance by LEs feeling weak. Pt left seated up in bedside chair and encouraged to stay up for at least 2 hours. RN Valentin present and agreeable to assist patient back to bed. Elisha Young would benefit from acute PT intervention to address listed functional deficits, provide patient/caregiver education, reduce fall risk, and maximize (I) and safety with functional mobility.    Rehab Prognosis: Good; patient would benefit from acute skilled PT services to address these deficits and reach maximum level of function.    Recent Surgery: * No surgery found *      Plan:     During this hospitalization, patient to be seen   to address the identified rehab impairments via gait training, therapeutic activities, therapeutic exercises, neuromuscular re-education and progress toward the following goals:    · Plan of Care Expires:  11/07/20    This plan of care has been discussed with the patient/caregiver, who was included in its development and is in agreement with the identified goals and treatment plan.     Subjective     Communicated with RN prior to session.  Patient agreeable to participate.     Chief Complaint: none  Patient/Family Comments/goals: to go back home      Pain/Comfort:  · Location: none  · Pain Ratin/10   · Pain Rating Post-Intervention: 0/10     Patients cultural, spiritual, Gnosticist conflicts given the current situation: no    Living Environment: Pt lives with alone on one side of a double home, her son lives next door on the other side. Pt has 5 YULY.   Prior Level of Function: required caregiver assist for mobility and ADLs including dressing, bathing, meal preparation. Pt used to receive caregiver assist from visiting angels but recently discontinued their services due to pt not receiving adequate care.  DME used: grab bar, shower chair, walker, rolling, rollator, bedside commode, cane, straight  DME owned (not currently used): none  Upon discharge, patient will have assistance from: her son (who provides most meals and assist as needed, her neighbors (who bring her breakfast and assist with bathing and household chores). She reports that her neighbor across the street comes over to check on her 3-4 times per day.    Objective:     Patient found supine with peripheral IV, telemetry  upon PT entry to room.    General Precautions: Standard, fall   Orthopedic Precautions:N/A   Braces: N/A     Exams:     Cognition:  o Pt is alert and oriented x 4     Sensation:   o Light touch sensation: Intact BLEs     Gross Motor Coordination: No deficits noted during functional mobility tasks      Edema: none noted     Lower Extremity Range of Motion:  o Right Lower Extremity: WFL  o Left Lower Extremity: WFL     Lower Extremity Strength:  o Right Lower Extremity: 4/5 grossly   o Left Lower Extremity: 4/5 grossly    Functional Mobility:    Bed Mobility:  · Supine > Sit: Stand-by Assistance  · Sit > Supine: Stand-by Assistance    Transfers:   · Sit <> Stand Transfer: Minimal Assistance with RW  · Bed <> Chair: Minimal Assistance with RW AD              Gait:  · Patient took x 3 small steps to transfer from bed to chair with Geovanni and RW.   · Pt limited in distance by  fatigue and leg pain/weakness    Balance:  · Static Sit: Stand-By Assist at EOB x 10 minutes  · Static Stand: Min Assist with Rolling walker      Therapeutic Activities/Exercises         AM-PAC 6 CLICK MOBILITY  Total Score:17     Patient left up in chair with all lines intact, call button in reach and RN Valentin present.      Patient/Caregiver Education:     Therapist educated pt/caregiver regarding:    Therapist provided facilitation and instruction of proper body mechanics, energy conservation, and fall prevention strategies during tasks listed above.   PT POC and goals for therapy    Instruction on use of call button and importance of calling nursing staff for assistance with mobility/transfers    Patient/caregiver able to verbalize understanding; will follow-up with pt/caregiver during current admit for additional questions/concerns within scope of practice.     White board updated.       History/Goals:     PAST MEDICAL HISTORY:  Past Medical History:   Diagnosis Date    CHF (congestive heart failure)     Dyslipidemia     Hyperlipidemia     Hypertension        Past Surgical History:   Procedure Laterality Date    HYSTERECTOMY      tonsils         GOALS:   Multidisciplinary Problems     Physical Therapy Goals        Problem: Physical Therapy Goal    Goal Priority Disciplines Outcome Goal Variances Interventions   Physical Therapy Goal     PT, PT/OT Ongoing, Progressing     Description: Goals to be met by: 10/22/2020     Patient will increase functional independence with mobility by performin. Supine to sit with supervision  2. Sit to supine with supervision  3. Sit to stand transfer with Stand-by Assistance  4. Bed to chair transfer with Stand-by Assistance using Rolling Walker  5. Gait  x 50 feet with Stand-by Assistance using Rolling Walker.                      Time Tracking:     PT Received On: 10/08/20  PT Start Time: 1055     PT Stop Time: 1123  PT Total Time (min): 28 min     Billable Minutes:  Evaluation 14 and Therapeutic Activity 14      ABUNDIO TOBIN, PT  10/08/2020

## 2020-10-08 NOTE — PLAN OF CARE
Problem: Occupational Therapy Goal  Goal: Occupational Therapy Goal  Description: Goals to be met by: 10/22/2020    Patient will increase functional independence with ADLs by performing:    UE Dressing with Supervision.  LE Dressing with Supervision.  Grooming while standing with Supervision.  Toileting from bedside commode with Supervision for hygiene and clothing management.   Step transfer with Supervision  Toilet transfer to bedside commode with Supervision.    Outcome: Ongoing, Progressing   FIDE Cortez

## 2020-10-08 NOTE — PLAN OF CARE
CM met with patient for discharge planning.  Patient states she lives with her son in a one story house with four steps to enter with a railing.  Patient ambulates with a rolling walker and is independent with ADL's.  Patient's son and friend across the street are available to help her if needed.  Patient son will provide transport home.  Plan is to discharge home vs home with home health.    Pharmacy:    [id number] - [place], [place] - [address] AT [address]  [email or web address]  [facility] [address]  Phone: [phone] Fax: [phone]    PCP:  Marky García MD    Payor: MEDICARE / Plan: MEDICARE PART A & B / Product Type: [facility] /      10/08/20 1509   Discharge Assessment   Assessment Type Discharge Planning Assessment   Confirmed/corrected address and phone number on facesheet? Yes   Assessment information obtained from? Patient   Expected Length of Stay (days) 3   Prior to hospitilization cognitive status: Alert/Oriented   Prior to hospitalization functional status: Independent;Assistive Equipment  (Rolling walker)   Current cognitive status: Alert/Oriented   Current Functional Status: Independent;Assistive Equipment   Facility Arrived From: Emergency room   Lives With child(jarrell), adult   Able to Return to Prior Arrangements yes   Is patient able to care for self after discharge? Unable to determine at this time (comments)   Patient's perception of discharge disposition home health   Readmission Within the Last 30 Days no previous admission in last 30 days   Patient currently being followed by outpatient case management? No   Patient currently receives any other outside agency services? No   Equipment Currently Used at Home grab bar;shower chair;bedside commode;cane, straight;walker, rolling;rollator   Do you have any problems affording any of your prescribed medications? No   Is the patient taking medications as prescribed? yes   Does the patient  have transportation home? Yes   Transportation Anticipated family or friend will provide   Does the patient receive services at the [facility]? No   Discharge Plan A Home Health   Discharge Plan B Home with family   DME Needed Upon Discharge  none   Patient/Family in Agreement with Plan yes

## 2020-10-08 NOTE — PHARMACY MED REC
"Admission Medication Reconciliation - Pharmacy Consult Note    The home medication history was taken by Alivia Raines PharmD.  Based on information gathered and subsequent review by the clinical pharmacist, the items below may need attention.     You may go to "Admission" then "Reconcile Home Medications" tabs to review and/or act upon these items.     Potentially problematic discrepancies with current MAR  o Patient IS taking the following which was not ordered upon admit BUT all were last filled in June 2020  o Amlodipine 10 mg PO daily  o Hydrochlorothiazide 12.5 mg PO daily  o Metoprolol Tartrate 25 mg PO daily (Hold for HR < 60)  o Gemfibrozil 600 mg PO twice daily  o Simvastatin 20 mg PO daily    Potential issues to be addressed PRIOR TO DISCHARGE  o Patient request for refills (only was able to receive a 1 month prescription from Dr. Marky García)  o Patient does not have transportation to see provider routinely for follow up.    Please address this information as you see fit.  Feel free to contact us if you have any questions or require assistance.    Alivia Raines  EXT [phone]                .    .            "

## 2020-10-08 NOTE — PLAN OF CARE
Pt had a good night on last night. No distress noted throughout shift. Resp even and unlabored.  Pt denied pain throughout night.   Pt AAOx4. Oliver catheter in place, light red urine draining at this time. Will continue to monitor for changes

## 2020-10-08 NOTE — PROGRESS NOTES
Hospital Medicine   Progress note   Team: [facility] Abrahan Hoff MD   Admit Date: 10/7/2020   JULIO CESAR 10/10/2020   Code status: Full Code   Principal Problem: Hyponatremia     Hospital Course:    Elisha Young is a 89 y.o. female with HTN, HLD, CHF who presents with a chief complaint of vaginal bleeding.  Patient has been suffering from bowel and urinary incontinence over the past month and has been using adult diapers.  One week prior, patient had an episode of bleeding in the diaper that quickly resolved.  She and her son are not exactly sure where the bleeding is coming from but vaginal source was suspected.  Beginning yesterday, patient reports bleeding returned.  It has progressively worsened since last night.  Now noted bright red blood and clots.  Patient is on no anticoagulants or anti-platelet agents.  She reports having hysterectomy years prior.  No weakness, fatigue, SOB, or lightheadedness.  No suprapubic pain, dysuria, increased frequency. No n/v, headache, confusion. She reports sufficient oral intake, but son believes she has been eating less than normal.      In ED, straight cath revealed dark bloody urine, so hematuria is now suspected to be the source of bleed. Oliver placed and bladder irrigated. Hb WNL. UA indicated possible UTI, CTX initiated. Also found to have Na 123, Cl 90. No source of vaginal bleed noted on pelvic US. Echogenic nonmobile focus on posterior bladder wall noted on RP u/s, with neoplasm as a consideration. Urology consulted.     Interval hx:   Pt normotensive off of home antihypertensive meds. Pharmacy performed med rec, discovered that pt had been taking thiazide, likely contributor to hyponatremia. Hyponatremia continues slow improvement. Continues to have small clots in urine. Urology able to irrigate to clear. Not planning procedure today. Pt denies any current symptoms outside of hematuria.     ROS   General: no fever, no chills, no weight loss, no fatigue  Nose,  Sinuses: no rhinorrhea, no facial pain, no epistaxis  Cardiovascular: no chest pain, no orthopnea, no dyspnea  Respiratory: no cough, no wheezes, no chest pain  Urinary: no dysuria, no increased frequency, Hematuria.   Hematologic: no easy bruising, no overt bleeding, no petechiae  Endocrine: no heat intolerance, no cold intolerance, no polyuria  Neurologic: no seizures, no tremors, no numbness  Psychiatric: no hallucination, no depression, no suicidal ideation  Skin: no rashes, no pruritus, no pallor  All other systems reviewed & are negative.     PEx   Temp:  [98.6 °F (37 °C)-99 °F (37.2 °C)]   Pulse:  []   Resp:  [17-20]   BP: (105-134)/(55-86)   SpO2:  [96 %-99 %]      I & O (Last 24H):     Intake/Output Summary (Last 24 hours) at 10/8/2020 1323  Last data filed at 10/8/2020 0819  Gross per 24 hour   Intake 360 ml   Output 500 ml   Net -140 ml       GEN: AAOx3, NAD  HEENT: NCAT, MMM, PERRL, EOMI, oropharynx clear  CV: RRR, no m/r  RESP: CTAB, no wheezes/crackles  : ramos in place with hematuria  ABD: soft, NTND, normoactive BS, no organomegaly  EXTR: no c/c/e, 2+ distal pulses x 4  NEURO: PERRL, EOMI, 5/5 motor strength all four extremities, intact sensation to light touch, no focal deficits  SKIN: no rashes, lesions, or color changes  PSYCH: normal affect    Recent Results (from the past 24 hour(s))   Basic metabolic panel    Collection Time: 10/07/20  5:01 PM   Result Value Ref Range    Sodium 121 (L) 136 - 145 mmol/L    Potassium 3.5 3.5 - 5.1 mmol/L    Chloride 94 (L) 95 - 110 mmol/L    CO2 15 (L) 23 - 29 mmol/L    Glucose 105 70 - 110 mg/dL    BUN, Bld 18 10 - 30 mg/dL    Creatinine 0.6 0.5 - 1.4 mg/dL    Calcium 9.1 8.7 - 10.5 mg/dL    Anion Gap 12 8 - 16 mmol/L    eGFR if African American >60.0 >60 mL/min/1.73 m^2    eGFR if non African American >60.0 >60 mL/min/1.73 m^2   Sodium    Collection Time: 10/07/20 10:52 PM   Result Value Ref Range    Sodium 127 (L) 136 - 145 mmol/L   Sodium     Collection Time: 10/07/20 10:52 PM   Result Value Ref Range    Sodium 127 (L) 136 - 145 mmol/L   COVID-19 Rapid Screening    Collection Time: 10/07/20 11:02 PM   Result Value Ref Range    SARS-CoV-2 RNA, Amplification, Qual Negative Negative   CBC with Automated Differential    Collection Time: 10/08/20  5:47 AM   Result Value Ref Range    WBC 6.80 3.90 - 12.70 K/uL    RBC 3.49 (L) 4.00 - 5.40 M/uL    Hemoglobin 11.7 (L) 12.0 - 16.0 g/dL    Hematocrit 33.3 (L) 37.0 - 48.5 %    Mean Corpuscular Volume 95 82 - 98 fL    Mean Corpuscular Hemoglobin 33.5 (H) 27.0 - 31.0 pg    Mean Corpuscular Hemoglobin Conc 35.1 32.0 - 36.0 g/dL    RDW 14.2 11.5 - 14.5 %    Platelets 289 150 - 350 K/uL    MPV 9.4 9.2 - 12.9 fL    Immature Granulocytes 1.2 (H) 0.0 - 0.5 %    Gran # (ANC) 2.9 1.8 - 7.7 K/uL    Immature Grans (Abs) 0.08 (H) 0.00 - 0.04 K/uL    Lymph # 2.8 1.0 - 4.8 K/uL    Mono # 0.6 0.3 - 1.0 K/uL    Eos # 0.3 0.0 - 0.5 K/uL    Baso # 0.07 0.00 - 0.20 K/uL    nRBC 0 0 /100 WBC    Gran% 42.5 38.0 - 73.0 %    Lymph% 40.9 18.0 - 48.0 %    Mono% 9.4 4.0 - 15.0 %    Eosinophil% 5.0 0.0 - 8.0 %    Basophil% 1.0 0.0 - 1.9 %    Differential Method Automated    Sodium    Collection Time: 10/08/20  5:47 AM   Result Value Ref Range    Sodium 126 (L) 136 - 145 mmol/L   Basic Metabolic Panel    Collection Time: 10/08/20  5:47 AM   Result Value Ref Range    Sodium 124 (L) 136 - 145 mmol/L    Potassium 4.5 3.5 - 5.1 mmol/L    Chloride 101 95 - 110 mmol/L    CO2 15 (L) 23 - 29 mmol/L    Glucose 107 70 - 110 mg/dL    BUN, Bld 13 10 - 30 mg/dL    Creatinine 0.6 0.5 - 1.4 mg/dL    Calcium 8.4 (L) 8.7 - 10.5 mg/dL    Anion Gap 8 8 - 16 mmol/L    eGFR if African American >60.0 >60 mL/min/1.73 m^2    eGFR if non African American >60.0 >60 mL/min/1.73 m^2   Sodium    Collection Time: 10/08/20 11:32 AM   Result Value Ref Range    Sodium 128 (L) 136 - 145 mmol/L       No results for input(s): POCTGLUCOSE in the last 168 hours.    No results found  for: HGBA1C     Active Hospital Problems    Diagnosis  POA    *Hyponatremia [E87.1]  Unknown    Acute cystitis with hematuria [N30.01]  Yes    Gross hematuria [R31.0]  Yes    UTI (urinary tract infection) [N39.0]  Yes    Hypokalemia [E87.6]  Unknown    Hypertension [I10]  Yes      Resolved Hospital Problems   No resolved problems to display.        Overview:    This is a 89 y.o. female with HTN, HLD, CHF, chronic hypnatremia admitted for hematuria due to bacterial cystitis. Unclear if pt has a mass on posterior bladder wall or debris. Pt also with acute on chronic hyponatremia, improving.     Assessment and Plan for problems addressed today:     Hyponatremia  Hypochloremia  -Na 123 on admission, Cl 90. Improving. Baseline Na 129-133  -suspect hypovolemic and effect of thiazide  -asymptomatic  -c/w NS IVF  -holding thiazide  -avoid correction > 4-6 per day. Now monitoring Na daily     Hematuria  -Hb WNL. RP u/s concerning for possible neoplasm  -Oliver placed, s/p irrigation  -Appreciate urology recs: intermittently irrigating. No planned procedure today. Possible cystoscopy tomorrow.   -trend H/H     Abnormal UA  -concerning for UTI w/ hematuria  -f/u UCx. Currently growing GNRs.   -c/w CTX     HTN  -holding home HCTZ for hyponatremia  -holding home amlodipine and lopressor for borderline hypotension. Resume as needed.          DVT PPx: holding for active bleed. SCDs only.     Discharge Planning   JULIO CESAR:  10/9    Code Status: Prior   Is the patient medically ready for discharge?:     Reason for patient still in hospital (select all that apply): Patient new problem, Patient trending condition, Laboratory test, Treatment, Consult recommendations and PT / OT recommendations      Abrahan Hoff MD  [facility] Medicine Staff  [phone] Pager

## 2020-10-08 NOTE — PLAN OF CARE
Problem: Physical Therapy Goal  Goal: Physical Therapy Goal  Description: Goals to be met by: 10/22/2020     Patient will increase functional independence with mobility by performin. Supine to sit with supervision  2. Sit to supine with supervision  3. Sit to stand transfer with Stand-by Assistance  4. Bed to chair transfer with Stand-by Assistance using Rolling Walker  5. Gait  x 50 feet with Stand-by Assistance using Rolling Walker.     Outcome: Ongoing, Progressing     Initial Evaluation performed and Physical Therapy POC established this date. Patient is appropriate for acute PT services and is in agreement with established goals.     Coretta Kyle, PT, DPT  10/8/2020

## 2020-10-08 NOTE — SUBJECTIVE & OBJECTIVE
Past Medical History:   Diagnosis Date    CHF (congestive heart failure)     Dyslipidemia     Hyperlipidemia     Hypertension        Past Surgical History:   Procedure Laterality Date    HYSTERECTOMY      tonsils         Review of patient's allergies indicates:  No Known Allergies    Family History     Problem Relation (Age of Onset)    Heart disease Father          Tobacco Use    Smoking status: Never Smoker    Smokeless tobacco: Never Used   Substance and Sexual Activity    Alcohol use: No    Drug use: Never    Sexual activity: Not on file       Review of Systems   Constitutional: Negative for activity change, appetite change, fatigue and fever.   HENT: Negative for facial swelling and hearing loss.    Eyes: Negative for discharge and itching.   Respiratory: Negative for chest tightness and shortness of breath.    Cardiovascular: Negative for chest pain and leg swelling.   Gastrointestinal: Negative for abdominal distention, abdominal pain, constipation, diarrhea, nausea and vomiting.   Genitourinary: Positive for hematuria. Negative for decreased urine volume, difficulty urinating, dysuria, flank pain, frequency, urgency and vaginal bleeding.   Musculoskeletal: Negative for arthralgias, back pain and neck pain.   Skin: Negative for color change and pallor.   Neurological: Negative for dizziness, facial asymmetry and light-headedness.   Hematological: Negative for adenopathy.   Psychiatric/Behavioral: Negative for agitation and decreased concentration. The patient is not nervous/anxious.        Objective:     Temp:  [97.6 °F (36.4 °C)] 97.6 °F (36.4 °C)  Pulse:  [] 97  Resp:  [16-20] 18  SpO2:  [96 %-99 %] 97 %  BP: (111-134)/(57-76) 117/61     Body mass index is 20.12 kg/m².           Drains     Drain                 Urethral Catheter 10/07/20 1216 Double-lumen 16 Fr. less than 1 day                Physical Exam   Constitutional: She is oriented to person, place, and time. She appears  well-developed. No distress.   HENT:   Head: Normocephalic and atraumatic.   Eyes: No scleral icterus.   Neck: No tracheal deviation present.   Cardiovascular: Normal rate.    Pulmonary/Chest: Effort normal. No respiratory distress.   Abdominal: Soft. She exhibits no distension. There is no abdominal tenderness.   Genitourinary:    Genitourinary Comments: Oliver catheter in place      Musculoskeletal: Normal range of motion.   Neurological: She is alert and oriented to person, place, and time.   Skin: Skin is warm and dry.     Psychiatric: Her behavior is normal.       Significant Labs:    BMP:  Recent Labs   Lab 10/07/20  1105 10/07/20  1701   * 121*   K 3.2* 3.5   CL 90* 94*   CO2 18* 15*   BUN 21 18   CREATININE 0.8 0.6   CALCIUM 9.8 9.1       CBC:  Recent Labs   Lab 10/07/20  1105   WBC 6.29   HGB 12.7   HCT 36.1*          All pertinent labs results from the past 24 hours have been reviewed.    Significant Imaging:  U/S: I have reviewed all results within the past 24 hours and my personal findings are:  no hydronephrosis bilaterally, echogenic focus in the bladder could be tumor vs debris vs clot

## 2020-10-08 NOTE — PROGRESS NOTES
[facility]  Urology  Progress Note    Patient Name: Elisha Young  MRN: [id number]  Admission Date: 10/7/2020  Hospital Length of Stay: 0 days  Code Status: Full Code   Attending Provider: Abrahan Hoff MD   Primary Care Physician: Marky García MD    Subjective:     HPI:  This is a 89 YOF with hx of HTN present to the ER with painless gross hematuria with clots that began yesterday. No suprapubic pain, dysuria, increased frequency, or urgency. She has been incontinent of urine and stool for the past couple months. She goes through multiple diapers daily. A ramos catheter was placed in the ER and multiple clots irrigated out.     On evaluation, she is afebrile with stable vitals. No distress. UA with >100W, >100R, few bacteria, nit neg. Cr at baseline. RP US with no hydronephrosis. A 16 Fr ramos catheter was in place draining bloody urine.    She is not on anticoagulation. No smoking history. She reports having hysterectomy many years ago. No personal or family history of  malignancy.     Interval History:   Patient seen resting comfortably this morning  Ramos catheter draining but with red urine and some small clots in the tubing  Irrigated easily to clear this morning       Objective:     Temp:  [97.6 °F (36.4 °C)-98.9 °F (37.2 °C)] 98.6 °F (37 °C)  Pulse:  [] 89  Resp:  [16-20] 20  SpO2:  [96 %-99 %] 97 %  BP: (105-134)/(55-86) 116/82     Body mass index is 20.12 kg/m².           Drains     Drain                 Urethral Catheter 10/07/20 1216 Double-lumen 16 Fr. less than 1 day                Physical Exam   Constitutional: She is oriented to person, place, and time. She appears well-developed. No distress.   HENT:   Head: Normocephalic and atraumatic.   Eyes: No scleral icterus.   Neck: No tracheal deviation present.   Cardiovascular: Normal rate.    Pulmonary/Chest: Effort normal. No respiratory distress.   Abdominal: Soft. She exhibits no distension. There is no abdominal  tenderness.   Genitourinary:    Genitourinary Comments: Ramos catheter in place      Musculoskeletal: Normal range of motion.   Neurological: She is alert and oriented to person, place, and time.   Skin: Skin is warm and dry.     Psychiatric: Her behavior is normal.       Significant Labs:    BMP:  Recent Labs   Lab 10/07/20  1105 10/07/20  1701 10/07/20  2252 10/08/20  0547   * 121* 127*  127* 126*   K 3.2* 3.5  --   --    CL 90* 94*  --   --    CO2 18* 15*  --   --    BUN 21 18  --   --    CREATININE 0.8 0.6  --   --    CALCIUM 9.8 9.1  --   --        CBC:  Recent Labs   Lab 10/07/20  1105   WBC 6.29   HGB 12.7   HCT 36.1*          All pertinent labs results from the past 24 hours have been reviewed.    Significant Imaging:  U/S: I have reviewed all results within the past 24 hours and my personal findings are:  no hydronephrosis bilaterally, echogenic focus in the bladder could be tumor vs debris vs clot                     Assessment/Plan:     UTI (urinary tract infection)  - follow urine culture   - continue empiric rocephin    Gross hematuria  Ramos catheter up-sized to 24 Fr and irrigated easily to clear with return of many clots   - ramos catheter left in place  - urine cytology ordered  - can have diet today, will defer work up while there is concern for urinary infection   - nursing to irrigate ramos catheter PRN         VTE Risk Mitigation (From admission, onward)         Ordered     IP VTE HIGH RISK PATIENT  Once      10/07/20 2223     Place sequential compression device  Until discontinued      10/07/20 3469                Nayla Campbell MD  Urology  [facility]

## 2020-10-08 NOTE — HPI
This is a 89 YOF with hx of HTN present to the ER with painless gross hematuria with clots that began yesterday. No suprapubic pain, dysuria, increased frequency, or urgency. She has been incontinent of urine and stool for the past couple months. She goes through multiple diapers daily. A ramos catheter was placed in the ER and multiple clots irrigated out.     On evaluation, she is afebrile with stable vitals. No distress. UA with >100W, >100R, few bacteria, nit neg. Cr at baseline. RP US with no hydronephrosis. A 16 Fr ramos catheter was in place draining bloody urine.    She is not on anticoagulation. No smoking history. She reports having hysterectomy many years ago. No personal or family history of  malignancy.

## 2020-10-08 NOTE — SUBJECTIVE & OBJECTIVE
Interval History:   Patient seen resting comfortably this morning  Oliver catheter draining but with red urine and some small clots in the tubing  Irrigated easily to clear this morning       Objective:     Temp:  [97.6 °F (36.4 °C)-98.9 °F (37.2 °C)] 98.6 °F (37 °C)  Pulse:  [] 89  Resp:  [16-20] 20  SpO2:  [96 %-99 %] 97 %  BP: (105-134)/(55-86) 116/82     Body mass index is 20.12 kg/m².           Drains     Drain                 Urethral Catheter 10/07/20 1216 Double-lumen 16 Fr. less than 1 day                Physical Exam   Constitutional: She is oriented to person, place, and time. She appears well-developed. No distress.   HENT:   Head: Normocephalic and atraumatic.   Eyes: No scleral icterus.   Neck: No tracheal deviation present.   Cardiovascular: Normal rate.    Pulmonary/Chest: Effort normal. No respiratory distress.   Abdominal: Soft. She exhibits no distension. There is no abdominal tenderness.   Genitourinary:    Genitourinary Comments: Oliver catheter in place      Musculoskeletal: Normal range of motion.   Neurological: She is alert and oriented to person, place, and time.   Skin: Skin is warm and dry.     Psychiatric: Her behavior is normal.       Significant Labs:    BMP:  Recent Labs   Lab 10/07/20  1105 10/07/20  1701 10/07/20  2252 10/08/20  0547   * 121* 127*  127* 126*   K 3.2* 3.5  --   --    CL 90* 94*  --   --    CO2 18* 15*  --   --    BUN 21 18  --   --    CREATININE 0.8 0.6  --   --    CALCIUM 9.8 9.1  --   --        CBC:  Recent Labs   Lab 10/07/20  1105   WBC 6.29   HGB 12.7   HCT 36.1*          All pertinent labs results from the past 24 hours have been reviewed.    Significant Imaging:  U/S: I have reviewed all results within the past 24 hours and my personal findings are:  no hydronephrosis bilaterally, echogenic focus in the bladder could be tumor vs debris vs clot

## 2020-10-08 NOTE — ASSESSMENT & PLAN NOTE
Ramos catheter up-sized to 24 Fr and irrigated easily to clear with return of many clots   - ramos catheter left in place  - urine cytology ordered  - can have diet today, will defer work up while there is concern for urinary infection   - nursing to irrigate ramos catheter PRN

## 2020-10-08 NOTE — ASSESSMENT & PLAN NOTE
Ramos catheter up-sized to 24 Fr and irrigated easily to clear with return of many clots   - ramos catheter left in place  - urine cytology ordered  - ramos check in the morning, depending on appearance, may do inpatient work up

## 2020-10-09 ENCOUNTER — ANESTHESIA EVENT (OUTPATIENT)
Dept: SURGERY | Facility: HOSPITAL | Age: 85
DRG: 669 | End: 2020-10-09
Payer: MEDICARE

## 2020-10-09 ENCOUNTER — ANESTHESIA (OUTPATIENT)
Dept: SURGERY | Facility: HOSPITAL | Age: 85
DRG: 669 | End: 2020-10-09
Payer: MEDICARE

## 2020-10-09 LAB
ANION GAP SERPL CALC-SCNC: 8 MMOL/L (ref 8–16)
BASOPHILS # BLD AUTO: 0.06 K/UL (ref 0–0.2)
BASOPHILS NFR BLD: 1 % (ref 0–1.9)
BUN SERPL-MCNC: 9 MG/DL (ref 10–30)
CALCIUM SERPL-MCNC: 8.1 MG/DL (ref 8.7–10.5)
CHLORIDE SERPL-SCNC: 104 MMOL/L (ref 95–110)
CO2 SERPL-SCNC: 16 MMOL/L (ref 23–29)
CREAT SERPL-MCNC: 0.6 MG/DL (ref 0.5–1.4)
DIFFERENTIAL METHOD: ABNORMAL
EOSINOPHIL # BLD AUTO: 0.3 K/UL (ref 0–0.5)
EOSINOPHIL NFR BLD: 5.3 % (ref 0–8)
ERYTHROCYTE [DISTWIDTH] IN BLOOD BY AUTOMATED COUNT: 14.6 % (ref 11.5–14.5)
EST. GFR  (AFRICAN AMERICAN): >60 ML/MIN/1.73 M^2
EST. GFR  (NON AFRICAN AMERICAN): >60 ML/MIN/1.73 M^2
FINAL PATHOLOGIC DIAGNOSIS: NORMAL
GLUCOSE SERPL-MCNC: 105 MG/DL (ref 70–110)
HCT VFR BLD AUTO: 27.7 % (ref 37–48.5)
HGB BLD-MCNC: 9 G/DL (ref 12–16)
IMM GRANULOCYTES # BLD AUTO: 0.02 K/UL (ref 0–0.04)
IMM GRANULOCYTES NFR BLD AUTO: 0.3 % (ref 0–0.5)
LYMPHOCYTES # BLD AUTO: 2.1 K/UL (ref 1–4.8)
LYMPHOCYTES NFR BLD: 33.8 % (ref 18–48)
MAGNESIUM SERPL-MCNC: 1.7 MG/DL (ref 1.6–2.6)
MCH RBC QN AUTO: 32.7 PG (ref 27–31)
MCHC RBC AUTO-ENTMCNC: 32.5 G/DL (ref 32–36)
MCV RBC AUTO: 101 FL (ref 82–98)
MONOCYTES # BLD AUTO: 0.6 K/UL (ref 0.3–1)
MONOCYTES NFR BLD: 10.3 % (ref 4–15)
NEUTROPHILS # BLD AUTO: 3.1 K/UL (ref 1.8–7.7)
NEUTROPHILS NFR BLD: 49.3 % (ref 38–73)
NRBC BLD-RTO: 0 /100 WBC
PHOSPHATE SERPL-MCNC: 3 MG/DL (ref 2.7–4.5)
PLATELET # BLD AUTO: 271 K/UL (ref 150–350)
PMV BLD AUTO: 9.5 FL (ref 9.2–12.9)
POTASSIUM SERPL-SCNC: 3.4 MMOL/L (ref 3.5–5.1)
RBC # BLD AUTO: 2.75 M/UL (ref 4–5.4)
SODIUM SERPL-SCNC: 127 MMOL/L (ref 136–145)
SODIUM SERPL-SCNC: 128 MMOL/L (ref 136–145)
SODIUM SERPL-SCNC: 130 MMOL/L (ref 136–145)
SODIUM SERPL-SCNC: 130 MMOL/L (ref 136–145)
WBC # BLD AUTO: 6.24 K/UL (ref 3.9–12.7)

## 2020-10-09 PROCEDURE — 11000001 HC ACUTE MED/SURG PRIVATE ROOM

## 2020-10-09 PROCEDURE — 52001 CYSTO W/IRRG&EVAC MLT CLOTS: CPT | Mod: 59,,, | Performed by: UROLOGY

## 2020-10-09 PROCEDURE — 36000707: Performed by: UROLOGY

## 2020-10-09 PROCEDURE — 99233 PR SUBSEQUENT HOSPITAL CARE,LEVL III: ICD-10-PCS | Mod: ,,, | Performed by: STUDENT IN AN ORGANIZED HEALTH CARE EDUCATION/TRAINING PROGRAM

## 2020-10-09 PROCEDURE — 71000015 HC POSTOP RECOV 1ST HR: Performed by: UROLOGY

## 2020-10-09 PROCEDURE — 25000003 PHARM REV CODE 250: Performed by: STUDENT IN AN ORGANIZED HEALTH CARE EDUCATION/TRAINING PROGRAM

## 2020-10-09 PROCEDURE — 88305 TISSUE EXAM BY PATHOLOGIST: CPT | Performed by: PATHOLOGY

## 2020-10-09 PROCEDURE — 88305 TISSUE EXAM BY PATHOLOGIST: CPT | Mod: 26,,, | Performed by: PATHOLOGY

## 2020-10-09 PROCEDURE — 25000003 PHARM REV CODE 250: Performed by: NURSE ANESTHETIST, CERTIFIED REGISTERED

## 2020-10-09 PROCEDURE — 37000009 HC ANESTHESIA EA ADD 15 MINS: Performed by: UROLOGY

## 2020-10-09 PROCEDURE — 84295 ASSAY OF SERUM SODIUM: CPT | Mod: 91

## 2020-10-09 PROCEDURE — C1758 CATHETER, URETERAL: HCPCS | Performed by: UROLOGY

## 2020-10-09 PROCEDURE — 52001 PR CYSTOURETHROSCOPY W/IRRIG & EVAC CLOTS: ICD-10-PCS | Mod: 59,,, | Performed by: UROLOGY

## 2020-10-09 PROCEDURE — D9220A PRA ANESTHESIA: ICD-10-PCS | Mod: CRNA,,, | Performed by: NURSE ANESTHETIST, CERTIFIED REGISTERED

## 2020-10-09 PROCEDURE — C1769 GUIDE WIRE: HCPCS | Performed by: UROLOGY

## 2020-10-09 PROCEDURE — D9220A PRA ANESTHESIA: Mod: CRNA,,, | Performed by: NURSE ANESTHETIST, CERTIFIED REGISTERED

## 2020-10-09 PROCEDURE — 71000044 HC DOSC ROUTINE RECOVERY FIRST HOUR: Performed by: UROLOGY

## 2020-10-09 PROCEDURE — 85025 COMPLETE CBC W/AUTO DIFF WBC: CPT

## 2020-10-09 PROCEDURE — 63600175 PHARM REV CODE 636 W HCPCS: Performed by: STUDENT IN AN ORGANIZED HEALTH CARE EDUCATION/TRAINING PROGRAM

## 2020-10-09 PROCEDURE — 74420 UROGRAPHY RTRGR +-KUB: CPT | Mod: 26,,, | Performed by: UROLOGY

## 2020-10-09 PROCEDURE — 52204 PR CYSTOURETHROSCOPY,BIOPSY: ICD-10-PCS | Mod: ,,, | Performed by: UROLOGY

## 2020-10-09 PROCEDURE — 80048 BASIC METABOLIC PNL TOTAL CA: CPT

## 2020-10-09 PROCEDURE — 99233 SBSQ HOSP IP/OBS HIGH 50: CPT | Mod: ,,, | Performed by: STUDENT IN AN ORGANIZED HEALTH CARE EDUCATION/TRAINING PROGRAM

## 2020-10-09 PROCEDURE — D9220A PRA ANESTHESIA: ICD-10-PCS | Mod: ANES,,, | Performed by: ANESTHESIOLOGY

## 2020-10-09 PROCEDURE — 25500020 PHARM REV CODE 255: Performed by: UROLOGY

## 2020-10-09 PROCEDURE — 36415 COLL VENOUS BLD VENIPUNCTURE: CPT

## 2020-10-09 PROCEDURE — 74420 PR  X-RAY RETROGRADE PYELOGRAM: ICD-10-PCS | Mod: 26,,, | Performed by: UROLOGY

## 2020-10-09 PROCEDURE — 83735 ASSAY OF MAGNESIUM: CPT

## 2020-10-09 PROCEDURE — 52204 CYSTOSCOPY W/BIOPSY(S): CPT | Mod: ,,, | Performed by: UROLOGY

## 2020-10-09 PROCEDURE — 88305 TISSUE EXAM BY PATHOLOGIST: ICD-10-PCS | Mod: 26,,, | Performed by: PATHOLOGY

## 2020-10-09 PROCEDURE — D9220A PRA ANESTHESIA: Mod: ANES,,, | Performed by: ANESTHESIOLOGY

## 2020-10-09 PROCEDURE — 36000706: Performed by: UROLOGY

## 2020-10-09 PROCEDURE — 63600175 PHARM REV CODE 636 W HCPCS: Performed by: HOSPITALIST

## 2020-10-09 PROCEDURE — 84100 ASSAY OF PHOSPHORUS: CPT

## 2020-10-09 PROCEDURE — 37000008 HC ANESTHESIA 1ST 15 MINUTES: Performed by: UROLOGY

## 2020-10-09 PROCEDURE — 25000003 PHARM REV CODE 250: Performed by: HOSPITALIST

## 2020-10-09 PROCEDURE — 63600175 PHARM REV CODE 636 W HCPCS: Performed by: NURSE ANESTHETIST, CERTIFIED REGISTERED

## 2020-10-09 RX ORDER — CEFAZOLIN SODIUM 1 G/3ML
INJECTION, POWDER, FOR SOLUTION INTRAMUSCULAR; INTRAVENOUS
Status: DISCONTINUED | OUTPATIENT
Start: 2020-10-09 | End: 2020-10-09

## 2020-10-09 RX ORDER — AMOXICILLIN 250 MG
1 CAPSULE ORAL DAILY PRN
Status: DISCONTINUED | OUTPATIENT
Start: 2020-10-09 | End: 2020-10-10 | Stop reason: HOSPADM

## 2020-10-09 RX ORDER — PHENYLEPHRINE HYDROCHLORIDE 10 MG/ML
INJECTION INTRAVENOUS
Status: DISCONTINUED | OUTPATIENT
Start: 2020-10-09 | End: 2020-10-09

## 2020-10-09 RX ORDER — PROPOFOL 10 MG/ML
VIAL (ML) INTRAVENOUS
Status: DISCONTINUED | OUTPATIENT
Start: 2020-10-09 | End: 2020-10-09

## 2020-10-09 RX ORDER — POLYETHYLENE GLYCOL 3350 17 G/17G
17 POWDER, FOR SOLUTION ORAL
Status: DISCONTINUED | OUTPATIENT
Start: 2020-10-09 | End: 2020-10-10 | Stop reason: HOSPADM

## 2020-10-09 RX ORDER — FENTANYL CITRATE 50 UG/ML
INJECTION, SOLUTION INTRAMUSCULAR; INTRAVENOUS
Status: DISCONTINUED | OUTPATIENT
Start: 2020-10-09 | End: 2020-10-09

## 2020-10-09 RX ORDER — MAGNESIUM SULFATE HEPTAHYDRATE 40 MG/ML
2 INJECTION, SOLUTION INTRAVENOUS ONCE
Status: COMPLETED | OUTPATIENT
Start: 2020-10-09 | End: 2020-10-09

## 2020-10-09 RX ORDER — LIDOCAINE HYDROCHLORIDE 20 MG/ML
INJECTION INTRAVENOUS
Status: DISCONTINUED | OUTPATIENT
Start: 2020-10-09 | End: 2020-10-09

## 2020-10-09 RX ORDER — POTASSIUM CHLORIDE 20 MEQ/1
40 TABLET, EXTENDED RELEASE ORAL
Status: COMPLETED | OUTPATIENT
Start: 2020-10-09 | End: 2020-10-09

## 2020-10-09 RX ORDER — SULFAMETHOXAZOLE AND TRIMETHOPRIM 800; 160 MG/1; MG/1
1 TABLET ORAL 2 TIMES DAILY
Status: DISCONTINUED | OUTPATIENT
Start: 2020-10-10 | End: 2020-10-10 | Stop reason: HOSPADM

## 2020-10-09 RX ORDER — PROPOFOL 10 MG/ML
VIAL (ML) INTRAVENOUS CONTINUOUS PRN
Status: DISCONTINUED | OUTPATIENT
Start: 2020-10-09 | End: 2020-10-09

## 2020-10-09 RX ADMIN — PROPOFOL 10 MG: 10 INJECTION, EMULSION INTRAVENOUS at 01:10

## 2020-10-09 RX ADMIN — SODIUM CHLORIDE: 0.9 INJECTION, SOLUTION INTRAVENOUS at 04:10

## 2020-10-09 RX ADMIN — DOCUSATE SODIUM 50MG AND SENNOSIDES 8.6MG 1 TABLET: 8.6; 5 TABLET, FILM COATED ORAL at 04:10

## 2020-10-09 RX ADMIN — POTASSIUM CHLORIDE 40 MEQ: 1500 TABLET, EXTENDED RELEASE ORAL at 04:10

## 2020-10-09 RX ADMIN — PROPOFOL 50 MCG/KG/MIN: 10 INJECTION, EMULSION INTRAVENOUS at 01:10

## 2020-10-09 RX ADMIN — GEMFIBROZIL 600 MG: 600 TABLET ORAL at 04:10

## 2020-10-09 RX ADMIN — CEFTRIAXONE SODIUM 1 G: 1 INJECTION, POWDER, FOR SOLUTION INTRAMUSCULAR; INTRAVENOUS at 04:10

## 2020-10-09 RX ADMIN — LIDOCAINE HYDROCHLORIDE 40 MG: 20 INJECTION, SOLUTION INTRAVENOUS at 01:10

## 2020-10-09 RX ADMIN — PHENYLEPHRINE HYDROCHLORIDE 100 MCG: 10 INJECTION INTRAVENOUS at 01:10

## 2020-10-09 RX ADMIN — CEFAZOLIN 2 G: 330 INJECTION, POWDER, FOR SOLUTION INTRAMUSCULAR; INTRAVENOUS at 01:10

## 2020-10-09 RX ADMIN — FENTANYL CITRATE 25 MCG: 50 INJECTION, SOLUTION INTRAMUSCULAR; INTRAVENOUS at 01:10

## 2020-10-09 RX ADMIN — MAGNESIUM SULFATE 2 G: 2 INJECTION INTRAVENOUS at 04:10

## 2020-10-09 RX ADMIN — PHENYLEPHRINE HYDROCHLORIDE 200 MCG: 10 INJECTION INTRAVENOUS at 02:10

## 2020-10-09 RX ADMIN — GEMFIBROZIL 600 MG: 600 TABLET ORAL at 06:10

## 2020-10-09 RX ADMIN — SIMVASTATIN 20 MG: 20 TABLET, FILM COATED ORAL at 08:10

## 2020-10-09 NOTE — TRANSFER OF CARE
"Anesthesia Transfer of Care Note    Patient: Elisha Young    Procedure(s) Performed: Procedure(s) (LRB):  PYELOGRAM, RETROGRADE (Bilateral)  CYSTOSCOPY (N/A)  REMOVAL, BLOOD CLOT (N/A)  FULGURATION, BLADDER (N/A)  BIOPSY, BLADDER (N/A)    Patient location: [facility]    Anesthesia Type: general    Transport from OR: Transported from OR on 6-10 L/min O2 by face mask with adequate spontaneous ventilation    Post pain: adequate analgesia    Post assessment: no apparent anesthetic complications and tolerated procedure well    Post vital signs: stable    Level of consciousness: awake and responds to stimulation    Nausea/Vomiting: no nausea/vomiting    Complications: none    Transfer of care protocol was followed      Last vitals:   Visit Vitals  /65 (BP Location: Left arm, Patient Position: Lying)   Pulse (P) 75   Temp (P) 36.5 °C (97.7 °F) (Temporal)   Resp (P) 20   Ht 5' 2" (1.575 m)   Wt 49.9 kg (110 lb 0.2 oz)   SpO2 (P) 100%   Breastfeeding No   BMI 20.12 kg/m²     "

## 2020-10-09 NOTE — PT/OT/SLP PROGRESS
Occupational Therapy      Patient Name:  Elisha Young   MRN:  [id number]    Patient remains appropriate for continued therapy & will be treated next on 10/12/2020.    DELMI Dhaliwal  10/9/2020

## 2020-10-09 NOTE — PLAN OF CARE
Problem: Adult Inpatient Plan of Care  Goal: Plan of Care Review  Outcome: Ongoing, Progressing     Problem: Fall Injury Risk  Goal: Absence of Fall and Fall-Related Injury  Outcome: Ongoing, Progressing     Problem: Infection  Goal: Infection Symptom Resolution  Outcome: Ongoing, Progressing     Plan of care reviewed with pt. Pt free of fall this shift. Pain assessed and pt denied pain. Antibiotic administered to manage pt UTI. Pt aaox4. Afebrile. Vss. Will continue to monitor pt.

## 2020-10-09 NOTE — OP NOTE
[facility]  Operative Note    Date: 10/09/2020    Pre-Op Diagnosis:   1. Gross hematuria  2. UTI     Patient Active Problem List    Diagnosis Date Noted    Acute cystitis with hematuria 10/07/2020    Gross hematuria 10/07/2020    UTI (urinary tract infection) 10/07/2020    Alteration in skin integrity 12/27/2019    Fall 12/26/2019    Closed fracture of ramus of right pubis 12/26/2019    Hyponatremia 12/26/2019    Anemia 12/26/2019    Hypertension 09/25/2013    Sinus arrhythmia 09/25/2013    Diastolic dysfunction 09/25/2013    Hypokalemia 09/25/2013      Post-Op Diagnosis: same    Procedure(s) Performed:   1.  Cystoscopy with bladder biopsy and fulguration  2.  Bilateral retrograde pyelogram  3.  Clot evacuation     Specimen(s):   Bladder biopsies    Staff Surgeon: Leonora Hercules MD    Assistant Surgeon: Nayla Campbell MD     Anesthesia: MAC    Indications: Elisha Young is a 89 y.o. female with gross hematuria. She presents today for cystoscopy with bladder biopsy and fulguration, and bilateral retrograde pyelograms.    Findings:   - Bladder hyperemic and irritated with areas of heaped up mucosa, bleeding in some areas, biopsied and fulgurated   - Bilateral retrograde pyelograms unremarkable       Estimated Blood Loss: min    Drains: none    Procedure in Detail:  The risks, benefits and alternatives of the procedure were explained, and all questions were answered in the tae-operative area. The patient was transferred to the cystoscopy suite and placed in the supine position. SCDs were applied and working. Anesthesia was administered. Once sedated, the patient was placed in the dorsal lithotomy position and prepped and draped in the usual sterile fashion. Time out was performed, tae-procedural antibiotics were confirmed.    A rigid cystoscope in a 22 Fr sheath was introduced into the bladder per urethra. This passed easily. 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    Anesthesia Type:   General     Note:  Airway :Face Mask  Patient transferred to:PACU  Comments: VSS. Breathing spontaneously at a regular rate with adequate tidal volumes and maintaining O2 sats on 6L facemask. Denies nausea or pain. No apparent complications from anesthesia.     Magdalene Gomez MD  [facility] Anesthesia Resident  Handoff Report: Identifed the Patient, Identified the Reponsible Provider, Reviewed the pertinent medical history, Discussed the surgical course, Reviewed Intra-OP anesthesia mangement and issues during anesthesia, Set expectations for post-procedure period and Allowed opportunity for questions and acknowledgement of understanding      Vitals: (Last set prior to Anesthesia Care Transfer)    CRNA VITALS  2/27/2020 1218 - 2/27/2020 1256      2/27/2020             Ht Rate:  195    SpO2:  95 %                Electronically Signed By: Magdalene Cowart MD  February 27, 2020  12:56 PM   masses or strictures. There was old clot within the bladder that was mostly irrigated out with the scope. There was a larger remaining clot that was grasped with alligator forceps and removed. The right and left ureteral orifices were identified in the normal anatomic position.    A 5 Fr ureteral catheter was fed through the right UO. A right retrograde pyelogram was done which showed a delicate ureter, somewhat tortuous but no hydronephrosis, sharp calyces, and no filling defects. This was then performed on the left side which showed a delicate ureter, sharp calyces, and no filling defects.     Thorough cystoscopy was performed which revealed a hyperemic and irritated bladder throughout. There were some intermittent bleeding from the mucosa seen. There were areas of heaped up mucosa that appeared more likely from edema rather than malignancy, however these areas were biopsied and sent for pathology.    The bugbee electrocautery was introduced through the scope and the bleeding areas were fulgurated. Hemostasis was achieved. The bladder was drained and refilled. Hemostasis was confirmed.    The bladder was drained, and the patient was removed from lithotomy.     The patient tolerated the procedure well and was transferred to recovery in stable condition.    Disposition:  A ramos catheter was not replaced. The patient can be discharged on culture appropriate antibiotics pending completion of voiding trial from urology perspective. She will be called with pathology results.     MD VERONICA Petty was present for the pertinent portion of the case and agree with the above note.

## 2020-10-09 NOTE — PT/OT/SLP PROGRESS
Physical Therapy      Patient Name:  Elisha Young   MRN:  [id number]    Patient not seen today secondary to pt off floor for cystoscopy. PT to follow up at a later date as appropriate.     ABUNDIO TOBIN, PT

## 2020-10-09 NOTE — PROGRESS NOTES
Hospital Medicine   Progress note   Team: [facility] MED N Abrahan Hoff MD   Admit Date: 10/7/2020   JULIO CESAR 10/10/2020   Code status: Full Code   Principal Problem: Hyponatremia     Hospital Course:    Elisha Young is a 89 y.o. female with HTN, HLD, CHF who presents with a chief complaint of vaginal bleeding.  Patient has been suffering from bowel and urinary incontinence over the past month and has been using adult diapers.  One week prior, patient had an episode of bleeding in the diaper that quickly resolved.  She and her son are not exactly sure where the bleeding is coming from but vaginal source was suspected.  Beginning yesterday, patient reports bleeding returned.  It has progressively worsened since last night.  Now noted bright red blood and clots.  Patient is on no anticoagulants or anti-platelet agents.  She reports having hysterectomy years prior.  No weakness, fatigue, SOB, or lightheadedness.  No suprapubic pain, dysuria, increased frequency. No n/v, headache, confusion. She reports sufficient oral intake, but son believes she has been eating less than normal.      In ED, straight cath revealed dark bloody urine, so hematuria is now suspected to be the source of bleed. Oliver placed and bladder irrigated. Hb WNL. UA indicated possible UTI, CTX initiated. Also found to have Na 123, Cl 90. No source of vaginal bleed noted on pelvic US. Echogenic nonmobile focus on posterior bladder wall noted on RP u/s, with neoplasm as a consideration. Urology consulted.     Pharmacy performed med rec, discovered that pt had been taking thiazide, likely contributor to hyponatremia.    Interval hx:   Hgb dropped to 9 this am from 12.7 2 days ago. Pt taken to OR this am with urology. Underwent cystoscopy, results pending. No new complaints.     ROS   General: no fever, no chills, no weight loss, no fatigue  Nose, Sinuses: no rhinorrhea, no facial pain, no epistaxis  Cardiovascular: no chest pain, no orthopnea, no  dyspnea  Respiratory: no cough, no wheezes, no chest pain  Urinary: no dysuria, no increased frequency, Hematuria.   Hematologic: no easy bruising, no overt bleeding, no petechiae  Endocrine: no heat intolerance, no cold intolerance, no polyuria  Neurologic: no seizures, no tremors, no numbness  Psychiatric: no hallucination, no depression, no suicidal ideation  Skin: no rashes, no pruritus, no pallor  All other systems reviewed & are negative.     PEx   Temp:  [98 °F (36.7 °C)-99.1 °F (37.3 °C)]   Pulse:  [81-98]   Resp:  [16-21]   BP: ()/(56-65)   SpO2:  [95 %-99 %]      I & O (Last 24H):     Intake/Output Summary (Last 24 hours) at 10/9/2020 1242  Last data filed at 10/9/2020 1110  Gross per 24 hour   Intake 480 ml   Output 3000 ml   Net -2520 ml       GEN: AAOx3, NAD  HEENT: NCAT, MMM, PERRL, EOMI, oropharynx clear  CV: RRR, no m/r  RESP: CTAB, no wheezes/crackles  : ramos removed.   ABD: soft, NTND, normoactive BS, no organomegaly  EXTR: no c/c/e, 2+ distal pulses x 4  NEURO: PERRL, EOMI, 5/5 motor strength all four extremities, intact sensation to light touch, no focal deficits  SKIN: no rashes, lesions, or color changes  PSYCH: normal affect    Recent Results (from the past 24 hour(s))   Sodium    Collection Time: 10/08/20  5:35 PM   Result Value Ref Range    Sodium 127 (L) 136 - 145 mmol/L   Sodium    Collection Time: 10/09/20  2:47 AM   Result Value Ref Range    Sodium 130 (L) 136 - 145 mmol/L   CBC with Automated Differential    Collection Time: 10/09/20  2:47 AM   Result Value Ref Range    WBC 6.24 3.90 - 12.70 K/uL    RBC 2.75 (L) 4.00 - 5.40 M/uL    Hemoglobin 9.0 (L) 12.0 - 16.0 g/dL    Hematocrit 27.7 (L) 37.0 - 48.5 %    Mean Corpuscular Volume 101 (H) 82 - 98 fL    Mean Corpuscular Hemoglobin 32.7 (H) 27.0 - 31.0 pg    Mean Corpuscular Hemoglobin Conc 32.5 32.0 - 36.0 g/dL    RDW 14.6 (H) 11.5 - 14.5 %    Platelets 271 150 - 350 K/uL    MPV 9.5 9.2 - 12.9 fL    Immature Granulocytes 0.3 0.0  - 0.5 %    Gran # (ANC) 3.1 1.8 - 7.7 K/uL    Immature Grans (Abs) 0.02 0.00 - 0.04 K/uL    Lymph # 2.1 1.0 - 4.8 K/uL    Mono # 0.6 0.3 - 1.0 K/uL    Eos # 0.3 0.0 - 0.5 K/uL    Baso # 0.06 0.00 - 0.20 K/uL    nRBC 0 0 /100 WBC    Gran% 49.3 38.0 - 73.0 %    Lymph% 33.8 18.0 - 48.0 %    Mono% 10.3 4.0 - 15.0 %    Eosinophil% 5.3 0.0 - 8.0 %    Basophil% 1.0 0.0 - 1.9 %    Differential Method Automated    Sodium    Collection Time: 10/09/20  2:47 AM   Result Value Ref Range    Sodium 127 (L) 136 - 145 mmol/L   Basic Metabolic Panel    Collection Time: 10/09/20  2:47 AM   Result Value Ref Range    Sodium 128 (L) 136 - 145 mmol/L    Potassium 3.4 (L) 3.5 - 5.1 mmol/L    Chloride 104 95 - 110 mmol/L    CO2 16 (L) 23 - 29 mmol/L    Glucose 105 70 - 110 mg/dL    BUN, Bld 9 (L) 10 - 30 mg/dL    Creatinine 0.6 0.5 - 1.4 mg/dL    Calcium 8.1 (L) 8.7 - 10.5 mg/dL    Anion Gap 8 8 - 16 mmol/L    eGFR if African American >60.0 >60 mL/min/1.73 m^2    eGFR if non African American >60.0 >60 mL/min/1.73 m^2   Magnesium    Collection Time: 10/09/20  2:47 AM   Result Value Ref Range    Magnesium 1.7 1.6 - 2.6 mg/dL   Phosphorus    Collection Time: 10/09/20  2:47 AM   Result Value Ref Range    Phosphorus 3.0 2.7 - 4.5 mg/dL       No results for input(s): POCTGLUCOSE in the last 168 hours.    No results found for: HGBA1C     Active Hospital Problems    Diagnosis  POA    *Hyponatremia [E87.1]  Unknown    Acute cystitis with hematuria [N30.01]  Yes    Gross hematuria [R31.0]  Yes    UTI (urinary tract infection) [N39.0]  Yes    Hypokalemia [E87.6]  Unknown    Hypertension [I10]  Yes      Resolved Hospital Problems   No resolved problems to display.        Overview:    This is a 89 y.o. female with HTN, HLD, CHF, chronic hypnatremia admitted for hematuria due to bacterial cystitis. Unclear if pt has a mass on posterior bladder wall or debris. Pt also with acute on chronic hyponatremia, improving.     Assessment and Plan for  problems addressed today:     Hyponatremia  Hypochloremia  -Na 123 on admission, Cl 90. Improving. Baseline Na 129-133  -suspect hypovolemic and effect of thiazide  -asymptomatic  -c/w NS IVF  -holding thiazide  -avoid correction > 4-6 per day. Now monitoring Na daily     Hematuria  -Hb WNL. RP u/s concerning for possible neoplasm on posterior bladder wall  -s/p cystoscopy today. No clear evidence of malignancy. Mass thought to have been a clot. bleeding in some areas, biopsied and fulgurated.   -trend H/H. Transfuse to > 7.   -Attempt trial of void without ramos.      Bacterial cystitis  -Ucx growing klebsiella intermediate to macrobid. Otherwise pansensitive.   -switch ceftriaxone to bactrim. Will treat for total of 5 days if symptoms including hematuria remain resolved.      HTN  -holding home HCTZ for hyponatremia  -holding home amlodipine and lopressor for borderline hypotension. Resume as needed.          DVT PPx: holding for active bleed. SCDs only.     Discharge Planning   JULIO CESAR:  10/10    Code Status: Prior   Is the patient medically ready for discharge?:     Reason for patient still in hospital (select all that apply): Patient new problem, Patient trending condition, Treatment, Consult recommendations      Abrahan Hoff MD  [facility] Medicine Staff  [phone] Pager

## 2020-10-09 NOTE — PROGRESS NOTES
[facility]  Urology  Progress Note    Patient Name: Elisha Young  MRN: [id number]  Admission Date: 10/7/2020  Hospital Length of Stay: 1 days  Code Status: Full Code   Attending Provider: Abrahan Hoff MD   Primary Care Physician: Marky García MD    Subjective:     HPI:  This is a 89 YOF with hx of HTN present to the ER with painless gross hematuria with clots that began yesterday. No suprapubic pain, dysuria, increased frequency, or urgency. She has been incontinent of urine and stool for the past couple months. She goes through multiple diapers daily. A ramos catheter was placed in the ER and multiple clots irrigated out.     On evaluation, she is afebrile with stable vitals. No distress. UA with >100W, >100R, few bacteria, nit neg. Cr at baseline. RP US with no hydronephrosis. A 16 Fr ramos catheter was in place draining bloody urine.    She is not on anticoagulation. No smoking history. She reports having hysterectomy many years ago. No personal or family history of  malignancy.     Interval History:   Patient seen resting comfortably this morning  Ramos catheter draining bloody urine, irrigated at bedside this morning      Objective:     Temp:  [98.4 °F (36.9 °C)-99.1 °F (37.3 °C)] 99.1 °F (37.3 °C)  Pulse:  [78-98] 91  Resp:  [16-21] 16  SpO2:  [97 %-98 %] 98 %  BP: ()/(59-63) 124/60     Body mass index is 20.12 kg/m².           Drains     Drain                 Urethral Catheter 10/07/20 1216 Double-lumen 16 Fr. less than 1 day                Physical Exam   Constitutional: She is oriented to person, place, and time. She appears well-developed. No distress.   HENT:   Head: Normocephalic and atraumatic.   Eyes: No scleral icterus.   Neck: No tracheal deviation present.   Cardiovascular: Normal rate.    Pulmonary/Chest: Effort normal. No respiratory distress.   Abdominal: Soft. She exhibits no distension. There is no abdominal tenderness.   Genitourinary:    Genitourinary Comments:  Ramos catheter in place      Musculoskeletal: Normal range of motion.   Neurological: She is alert and oriented to person, place, and time.   Skin: Skin is warm and dry.     Psychiatric: Her behavior is normal.       Significant Labs:    BMP:  Recent Labs   Lab 10/07/20  1701  10/08/20  0547 10/08/20  1132 10/08/20  1735 10/09/20  0247   *   < > 124*  126* 128* 127* 128*  127*  130*   K 3.5  --  4.5  --   --  3.4*   CL 94*  --  101  --   --  104   CO2 15*  --  15*  --   --  16*   BUN 18  --  13  --   --  9*   CREATININE 0.6  --  0.6  --   --  0.6   CALCIUM 9.1  --  8.4*  --   --  8.1*    < > = values in this interval not displayed.       CBC:  Recent Labs   Lab 10/07/20  1105 10/08/20  0547 10/09/20  0247   WBC 6.29 6.80 6.24   HGB 12.7 11.7* 9.0*   HCT 36.1* 33.3* 27.7*    289 271       All pertinent labs results from the past 24 hours have been reviewed.    Significant Imaging:  U/S: I have reviewed all results within the past 24 hours and my personal findings are:  no hydronephrosis bilaterally, echogenic focus in the bladder could be tumor vs debris vs clot                 Review of Systems          Assessment/Plan:     UTI (urinary tract infection)  - follow urine culture - growing GNR  - continue empiric rocephin    Gross hematuria  Ramos catheter up-sized to 24 Fr  - ramos catheter left in place  - urine cytology ordered  - will take to OR today for cystoscopy - possible clot evac, poss TURBT, poss BL RGP  - please keep NPO        VTE Risk Mitigation (From admission, onward)         Ordered     IP VTE HIGH RISK PATIENT  Once      10/08/20 1326     Place sequential compression device  Until discontinued      10/07/20 7289                Nayla Campbell MD  Urology  [facility]

## 2020-10-09 NOTE — SUBJECTIVE & OBJECTIVE
Interval History:   Patient seen resting comfortably this morning  Oliver catheter draining bloody urine, irrigated at bedside this morning      Objective:     Temp:  [98.4 °F (36.9 °C)-99.1 °F (37.3 °C)] 99.1 °F (37.3 °C)  Pulse:  [78-98] 91  Resp:  [16-21] 16  SpO2:  [97 %-98 %] 98 %  BP: ()/(59-63) 124/60     Body mass index is 20.12 kg/m².           Drains     Drain                 Urethral Catheter 10/07/20 1216 Double-lumen 16 Fr. less than 1 day                Physical Exam   Constitutional: She is oriented to person, place, and time. She appears well-developed. No distress.   HENT:   Head: Normocephalic and atraumatic.   Eyes: No scleral icterus.   Neck: No tracheal deviation present.   Cardiovascular: Normal rate.    Pulmonary/Chest: Effort normal. No respiratory distress.   Abdominal: Soft. She exhibits no distension. There is no abdominal tenderness.   Genitourinary:    Genitourinary Comments: Oliver catheter in place      Musculoskeletal: Normal range of motion.   Neurological: She is alert and oriented to person, place, and time.   Skin: Skin is warm and dry.     Psychiatric: Her behavior is normal.       Significant Labs:    BMP:  Recent Labs   Lab 10/07/20  1701  10/08/20  0547 10/08/20  1132 10/08/20  1735 10/09/20  0247   *   < > 124*  126* 128* 127* 128*  127*  130*   K 3.5  --  4.5  --   --  3.4*   CL 94*  --  101  --   --  104   CO2 15*  --  15*  --   --  16*   BUN 18  --  13  --   --  9*   CREATININE 0.6  --  0.6  --   --  0.6   CALCIUM 9.1  --  8.4*  --   --  8.1*    < > = values in this interval not displayed.       CBC:  Recent Labs   Lab 10/07/20  1105 10/08/20  0547 10/09/20  0247   WBC 6.29 6.80 6.24   HGB 12.7 11.7* 9.0*   HCT 36.1* 33.3* 27.7*    289 271       All pertinent labs results from the past 24 hours have been reviewed.    Significant Imaging:  U/S: I have reviewed all results within the past 24 hours and my personal findings are:  no hydronephrosis  bilaterally, echogenic focus in the bladder could be tumor vs debris vs clot                 Review of Systems

## 2020-10-09 NOTE — ANESTHESIA PREPROCEDURE EVALUATION
10/09/2020  Elisha Young is a 89 y.o., female   Pre-operative evaluation for Procedure(s) (LRB):  CYSTOSCOPY, WITH RETROGRADE PYELOGRAM (Bilateral)    Elisha Young is a 89 y.o. female     Patient Active Problem List   Diagnosis    Hypertension    Sinus arrhythmia    Diastolic dysfunction    Hypokalemia    Fall    Closed fracture of ramus of right pubis    Hyponatremia    Anemia    Alteration in skin integrity    Acute cystitis with hematuria    Gross hematuria    UTI (urinary tract infection)       Review of patient's allergies indicates:  No Known Allergies    No current facility-administered medications on file prior to encounter.      Current Outpatient Medications on File Prior to Encounter   Medication Sig Dispense Refill    acetaminophen (TYLENOL) 325 MG tablet Take 2 tablets (650 mg total) by mouth every 6 (six) hours as needed. 120 tablet 0    amLODIPine (NORVASC) 10 MG tablet Take 10 mg by mouth once daily.      ANTIOX #11/OM3/DHA/EPA/LUT/SATYA (OCUVITE ADULT 50+ ORAL) Take 1 tablet by mouth once daily.       ferrous sulfate 325 (65 FE) MG EC tablet Take 1 tablet (325 mg total) by mouth once daily. 30 tablet 3    gemfibroziL (LOPID) 600 MG tablet Take 600 mg by mouth 2 (two) times daily before meals.      hydroCHLOROthiazide (MICROZIDE) 12.5 mg capsule Take 12.5 mg by mouth once daily.      metoprolol tartrate (LOPRESSOR) 25 MG tablet Take 25 mg by mouth once daily. Hold for HR < 60      simvastatin (ZOCOR) 20 MG tablet Take 20 mg by mouth every evening.       POTASSIUM ORAL Take 1 tablet by mouth once daily. OTC      senna-docusate 8.6-50 mg (PERICOLACE) 8.6-50 mg per tablet Take 2 tablets by mouth once daily. (Patient taking differently: Take 2 tablets by mouth once daily. As needed for constipation) 30 tablet 0       Past Surgical History:   Procedure Laterality Date     HYSTERECTOMY      tonsils         CBC:   Recent Labs     10/08/20  0547 10/09/20  0247   WBC 6.80 6.24   RBC 3.49* 2.75*   HGB 11.7* 9.0*   HCT 33.3* 27.7*    271   MCV 95 101*   MCH 33.5* 32.7*   MCHC 35.1 32.5       CMP:   Recent Labs     10/07/20  1105  10/08/20  0547  10/08/20  1735 10/09/20  0247   *   < > 124*  126*   < > 127* 128*  127*  130*   K 3.2*   < > 4.5  --   --  3.4*   CL 90*   < > 101  --   --  104   CO2 18*   < > 15*  --   --  16*   BUN 21   < > 13  --   --  9*   CREATININE 0.8   < > 0.6  --   --  0.6   *   < > 107  --   --  105   MG  --   --   --   --   --  1.7   PHOS  --   --   --   --   --  3.0   CALCIUM 9.8   < > 8.4*  --   --  8.1*   ALBUMIN 3.6  --   --   --   --   --    PROT 7.3  --   --   --   --   --    ALKPHOS 122  --   --   --   --   --    ALT 11  --   --   --   --   --    AST 21  --   --   --   --   --    BILITOT 0.7  --   --   --   --   --     < > = values in this interval not displayed.       INR  Recent Labs     10/07/20  1105   INR 0.9       2D Echo:  Results for orders placed or performed during the hospital encounter of 03/06/13   2D Echo w/ Color Flow Doppler   Result Value Ref Range    QEF 65        Anesthesia Evaluation    I have reviewed the Patient Summary Reports.    I have reviewed the Nursing Notes. I have reviewed the NPO Status.   I have reviewed the Medications.     Review of Systems  Anesthesia Hx:  No problems with previous Anesthesia Denies Hx of Anesthetic complications  History of prior surgery of interest to airway management or planning: Previous anesthesia: General Denies Family Hx of Anesthesia complications.   Denies Personal Hx of Anesthesia complications.   Social:  No Alcohol Use, Non-Smoker  Denies Tobacco Use.   Cardiovascular:   Hypertension, well controlled Denies MI.  Denies CAD.    Denies CABG/stent.  Denies Dysrhythmias.   Denies Angina. CHF hyperlipidemia Hx of sinus arrhythmia Denies Coronary Artery Disease.  Congestive  Heart Failure (CHF) , LV Diastolic HF Hypertension, Essential Hypertension , Pt in normal range, systolic < 130 and diastolic < 85, Well Controlled on Rx    Pulmonary:   Denies COPD.  Denies Asthma.  Denies Shortness of breath.  Denies Recent URI.  Denies Asthma.  Denies Chronic Obstructive Pulmonary Disease (COPD).    Renal/:   Denies Chronic Renal Disease.   Renal Symptoms/Infections/Stones: hematuria.  Denies Kidney Function/Disease    Hepatic/GI:   Denies GERD. Denies Liver Disease.  Denies Esophageal / Stomach Disorders  Denies Liver Disease    Neurological:   Denies TIA. Denies CVA. Denies Seizures.  Denies Seizure Disorder  Denies CVA - Cerebrovasular Accident  Denies TIA - Transient Ischemic Attack    Endocrine:   Denies Diabetes. Denies Hypothyroidism.  Denies Diabetes  Denies Thyroid Disease  Metabolic Disorders, Hyperlipoproteinemia, hypercholesterolemia, controlled on medication      Physical Exam  General:  Well nourished    Airway/Jaw/Neck:  Airway Findings: Mouth Opening: Normal Tongue: Normal  General Airway Assessment: Adult, Average  Mallampati: III  Improves to II with phonation.  TM Distance: 4 - 6 cm  Jaw/Neck Findings:  Neck ROM: Normal ROM      Dental:  Dental Findings: Edentulous, Upper Dentures, Lower Dentures   Chest/Lungs:  Chest/Lungs Findings: Normal Respiratory Rate, Clear to auscultation     Heart/Vascular:  Heart Findings: Rate: Normal  Rhythm: Regular Rhythm  Sounds: Normal  Heart murmur: negative       Mental Status:  Mental Status Findings:  Cooperative, Alert and Oriented         Anesthesia Plan  Type of Anesthesia, risks & benefits discussed:  Anesthesia Type:  MAC  Patient's Preference:   Intra-op Monitoring Plan: standard ASA monitors  Intra-op Monitoring Plan Comments:   Post Op Pain Control Plan: multimodal analgesia, IV/PO Opioids PRN and per primary service following discharge from PACU  Post Op Pain Control Plan Comments:   Induction:   IV  Beta Blocker:  Patient is not  currently on a Beta-Blocker (No further documentation required).       Informed Consent: Patient understands risks and agrees with Anesthesia plan.  Questions answered. Anesthesia consent signed with patient.  ASA Score: 2     Day of Surgery Review of History & Physical:    H&P update referred to the surgeon.         Ready For Surgery From Anesthesia Perspective.

## 2020-10-09 NOTE — PLAN OF CARE
Pt had a good night on last night. Patient slept throughout the night, no episodes. Patient continues to be oriented.ramos continues to be in place, urine bloody. Pt denies pain. NS continues to be infused at 100ml/hr, patient tolerating hydration well.  Will continue to monitor for changes

## 2020-10-09 NOTE — PROGRESS NOTES
Attempted to see patient for wound consult. Patient off floor for procedure. Will attempt to follow up next week if patient still inpatient. Nursing to continue care.

## 2020-10-09 NOTE — ANESTHESIA RELEASE NOTE
"Anesthesia Release from PACU Note    Patient: Elisha Young    Procedure(s) Performed: Procedure(s) (LRB):  PYELOGRAM, RETROGRADE (Bilateral)  CYSTOSCOPY (N/A)  REMOVAL, BLOOD CLOT (N/A)  FULGURATION, BLADDER (N/A)  BIOPSY, BLADDER (N/A)    Anesthesia type: general    Post pain: Adequate analgesia    Post assessment: no apparent anesthetic complications, tolerated procedure well and no evidence of recall    Last Vitals:   Visit Vitals  /70   Pulse 74   Temp 36.5 °C (97.7 °F) (Temporal)   Resp 18   Ht 5' 2" (1.575 m)   Wt 49.9 kg (110 lb 0.2 oz)   SpO2 98%   Breastfeeding No   BMI 20.12 kg/m²       Post vital signs: stable    Level of consciousness: awake, alert  and oriented    Nausea/Vomiting: no nausea/no vomiting    Complications: none    Airway Patency: patent    Respiratory: unassisted, spontaneous ventilation, room air    Cardiovascular: stable and blood pressure at baseline    Hydration: euvolemic  "

## 2020-10-09 NOTE — ANESTHESIA POSTPROCEDURE EVALUATION
Anesthesia Post Evaluation    Patient: Elisha Young    Procedure(s) Performed: Procedure(s) (LRB):  PYELOGRAM, RETROGRADE (Bilateral)  CYSTOSCOPY (N/A)  REMOVAL, BLOOD CLOT (N/A)  FULGURATION, BLADDER (N/A)  BIOPSY, BLADDER (N/A)    Final Anesthesia Type: general    Patient location during evaluation: [facility]  Patient participation: Yes- Able to Participate  Level of consciousness: awake and alert and oriented  Post-procedure vital signs: reviewed and stable  Pain management: adequate  Airway patency: patent    PONV status at discharge: No PONV  Anesthetic complications: no      Cardiovascular status: blood pressure returned to baseline and hemodynamically stable  Respiratory status: room air, spontaneous ventilation and unassisted  Hydration status: euvolemic  Follow-up not needed.          Vitals Value Taken Time   /70 10/09/20 1454   Temp 36.5 °C (97.7 °F) 10/09/20 1435   Pulse 76 10/09/20 1458   Resp 34 10/09/20 1458   SpO2 99 % 10/09/20 1458   Vitals shown include unvalidated device data.      No case tracking events are documented in the log.      Pain/Kelly Score: Kelly Score: 10 (10/9/2020  2:53 PM)

## 2020-10-09 NOTE — NURSING TRANSFER
Nursing Transfer Note      10/9/2020     Transfer From: [facility] to [facility]    Transfer via stretcher    Transfer with     Transported by patient transport    Medicines sent: none    Chart send with patient: Yes    Notified: Report called to tree kelly on 11th floor    Patient reassessed at: 1510    Patient stable, tolerating fluids. No complaints noted. Adequate for transfer back to [facility] at this time.

## 2020-10-10 VITALS
SYSTOLIC BLOOD PRESSURE: 121 MMHG | DIASTOLIC BLOOD PRESSURE: 69 MMHG | WEIGHT: 110 LBS | HEIGHT: 62 IN | RESPIRATION RATE: 20 BRPM | TEMPERATURE: 99 F | BODY MASS INDEX: 20.24 KG/M2 | HEART RATE: 94 BPM | OXYGEN SATURATION: 96 %

## 2020-10-10 PROBLEM — N30.01 ACUTE CYSTITIS WITH HEMATURIA: Status: RESOLVED | Noted: 2020-10-07 | Resolved: 2020-10-10

## 2020-10-10 PROBLEM — R31.0 GROSS HEMATURIA: Status: RESOLVED | Noted: 2020-10-07 | Resolved: 2020-10-10

## 2020-10-10 LAB
ANION GAP SERPL CALC-SCNC: 7 MMOL/L (ref 8–16)
BACTERIA UR CULT: ABNORMAL
BASOPHILS # BLD AUTO: 0.05 K/UL (ref 0–0.2)
BASOPHILS NFR BLD: 0.8 % (ref 0–1.9)
BUN SERPL-MCNC: 5 MG/DL (ref 10–30)
CALCIUM SERPL-MCNC: 8 MG/DL (ref 8.7–10.5)
CHLORIDE SERPL-SCNC: 102 MMOL/L (ref 95–110)
CO2 SERPL-SCNC: 21 MMOL/L (ref 23–29)
CREAT SERPL-MCNC: 0.5 MG/DL (ref 0.5–1.4)
DIFFERENTIAL METHOD: ABNORMAL
EOSINOPHIL # BLD AUTO: 0.3 K/UL (ref 0–0.5)
EOSINOPHIL NFR BLD: 4.2 % (ref 0–8)
ERYTHROCYTE [DISTWIDTH] IN BLOOD BY AUTOMATED COUNT: 14.3 % (ref 11.5–14.5)
EST. GFR  (AFRICAN AMERICAN): >60 ML/MIN/1.73 M^2
EST. GFR  (NON AFRICAN AMERICAN): >60 ML/MIN/1.73 M^2
GLUCOSE SERPL-MCNC: 106 MG/DL (ref 70–110)
HCT VFR BLD AUTO: 28.9 % (ref 37–48.5)
HGB BLD-MCNC: 9.5 G/DL (ref 12–16)
IMM GRANULOCYTES # BLD AUTO: 0.03 K/UL (ref 0–0.04)
IMM GRANULOCYTES NFR BLD AUTO: 0.5 % (ref 0–0.5)
LYMPHOCYTES # BLD AUTO: 1.7 K/UL (ref 1–4.8)
LYMPHOCYTES NFR BLD: 28.6 % (ref 18–48)
MAGNESIUM SERPL-MCNC: 2 MG/DL (ref 1.6–2.6)
MCH RBC QN AUTO: 32.4 PG (ref 27–31)
MCHC RBC AUTO-ENTMCNC: 32.9 G/DL (ref 32–36)
MCV RBC AUTO: 99 FL (ref 82–98)
MONOCYTES # BLD AUTO: 0.6 K/UL (ref 0.3–1)
MONOCYTES NFR BLD: 10.8 % (ref 4–15)
NEUTROPHILS # BLD AUTO: 3.3 K/UL (ref 1.8–7.7)
NEUTROPHILS NFR BLD: 55.1 % (ref 38–73)
NRBC BLD-RTO: 0 /100 WBC
PHOSPHATE SERPL-MCNC: 2.6 MG/DL (ref 2.7–4.5)
PLATELET # BLD AUTO: 286 K/UL (ref 150–350)
PMV BLD AUTO: 8.9 FL (ref 9.2–12.9)
POTASSIUM SERPL-SCNC: 3.5 MMOL/L (ref 3.5–5.1)
RBC # BLD AUTO: 2.93 M/UL (ref 4–5.4)
SODIUM SERPL-SCNC: 129 MMOL/L (ref 136–145)
SODIUM SERPL-SCNC: 129 MMOL/L (ref 136–145)
SODIUM SERPL-SCNC: 130 MMOL/L (ref 136–145)
SODIUM SERPL-SCNC: 130 MMOL/L (ref 136–145)
WBC # BLD AUTO: 5.94 K/UL (ref 3.9–12.7)

## 2020-10-10 PROCEDURE — 99239 HOSP IP/OBS DSCHRG MGMT >30: CPT | Mod: ,,, | Performed by: STUDENT IN AN ORGANIZED HEALTH CARE EDUCATION/TRAINING PROGRAM

## 2020-10-10 PROCEDURE — 99239 PR HOSPITAL DISCHARGE DAY,>30 MIN: ICD-10-PCS | Mod: ,,, | Performed by: STUDENT IN AN ORGANIZED HEALTH CARE EDUCATION/TRAINING PROGRAM

## 2020-10-10 PROCEDURE — 85025 COMPLETE CBC W/AUTO DIFF WBC: CPT

## 2020-10-10 PROCEDURE — 36415 COLL VENOUS BLD VENIPUNCTURE: CPT

## 2020-10-10 PROCEDURE — 25000003 PHARM REV CODE 250: Performed by: HOSPITALIST

## 2020-10-10 PROCEDURE — 25000003 PHARM REV CODE 250: Performed by: STUDENT IN AN ORGANIZED HEALTH CARE EDUCATION/TRAINING PROGRAM

## 2020-10-10 PROCEDURE — 80048 BASIC METABOLIC PNL TOTAL CA: CPT

## 2020-10-10 PROCEDURE — 84100 ASSAY OF PHOSPHORUS: CPT

## 2020-10-10 PROCEDURE — 83735 ASSAY OF MAGNESIUM: CPT

## 2020-10-10 PROCEDURE — 84295 ASSAY OF SERUM SODIUM: CPT | Mod: 91

## 2020-10-10 RX ORDER — SODIUM,POTASSIUM PHOSPHATES 280-250MG
2 POWDER IN PACKET (EA) ORAL
Status: DISCONTINUED | OUTPATIENT
Start: 2020-10-10 | End: 2020-10-10 | Stop reason: HOSPADM

## 2020-10-10 RX ORDER — SULFAMETHOXAZOLE AND TRIMETHOPRIM 800; 160 MG/1; MG/1
1 TABLET ORAL 2 TIMES DAILY
Qty: 10 TABLET | Refills: 0 | Status: SHIPPED | OUTPATIENT
Start: 2020-10-10 | End: 2020-10-15

## 2020-10-10 RX ORDER — POTASSIUM CHLORIDE 1.5 G/1.58G
40 POWDER, FOR SOLUTION ORAL ONCE
Status: COMPLETED | OUTPATIENT
Start: 2020-10-10 | End: 2020-10-10

## 2020-10-10 RX ADMIN — POTASSIUM CHLORIDE 40 MEQ: 1.5 POWDER, FOR SOLUTION ORAL at 12:10

## 2020-10-10 RX ADMIN — SODIUM CHLORIDE: 0.9 INJECTION, SOLUTION INTRAVENOUS at 05:10

## 2020-10-10 RX ADMIN — POTASSIUM & SODIUM PHOSPHATES POWDER PACK 280-160-250 MG 2 PACKET: 280-160-250 PACK at 12:10

## 2020-10-10 RX ADMIN — GEMFIBROZIL 600 MG: 600 TABLET ORAL at 05:10

## 2020-10-10 RX ADMIN — SULFAMETHOXAZOLE AND TRIMETHOPRIM 1 TABLET: 800; 160 TABLET ORAL at 08:10

## 2020-10-10 NOTE — PLAN OF CARE
Pt is getting discharge. Denied any pain or discomfort. Pt said her son gets off of work around 3 pm and will come and pick her up after.

## 2020-10-12 NOTE — DISCHARGE SUMMARY
[facility]  Discharge Summary  [facility] Medicine    Patient Name: Elisha Young  YOB: 1927    Admit Date: 10/7/2020    Discharge Date and Time: 10/10/2020  3:00 PM    Discharge Attending Physician: Abrahan Hoff MD     Team: [facility] MED N    Reason for Admission:   Chief Complaint   Patient presents with    Vaginal Bleeding     Pt's son activated EMS for a concern of vaginal bleeding. Pt denies pain, weakness or dizziness. Hx of a historectomy in the 1960s.        Active Hospital Problems    Diagnosis  POA    *Hyponatremia [E87.1]  Unknown    UTI (urinary tract infection) [N39.0]  Yes      Resolved Hospital Problems    Diagnosis Date Resolved POA    Acute cystitis with hematuria [N30.01] 10/10/2020 Yes    Gross hematuria [R31.0] 10/10/2020 Yes    Hypokalemia [E87.6] 10/10/2020 Unknown    Hypertension [I10] 10/10/2020 Yes       HPI:   Elisha Young is a 89 y.o. female with HTN, HLD, CHF who presents with a chief complaint of vaginal bleeding.  Patient has been suffering from bowel and urinary incontinence over the past month and has been using adult diapers.  One week prior, patient had an episode of bleeding in the diaper that quickly resolved.  She and her son are not exactly sure where the bleeding is coming from but vaginal source was suspected.  Beginning yesterday, patient reports bleeding returned.  It has progressively worsened since last night.  Now noted bright red blood and clots.  Patient is on no anticoagulants or anti-platelet agents.  She reports having hysterectomy years prior.  No weakness, fatigue, SOB, or lightheadedness.  No suprapubic pain, dysuria, increased frequency. No n/v, headache, confusion. She reports sufficient oral intake, but son believes she has been eating less than normal.      In ED, straight cath revealed dark bloody urine, so hematuria is now suspected to be the source of bleed. Oliver placed and bladder irrigated. Hb WNL. UA indicated  possible UTI, CTX initiated. Also found to have Na 123, Cl 90. No source of vaginal bleed noted on pelvic US. Echogenic nonmobile focus on posterior bladder wall noted on RP u/s, with neoplasm as a consideration. Urology consulted.     Hospital Course:     Oliver placed with intermittent irrigation. Pharmacy performed med rec, discovered that pt had been taking thiazide, likely contributor to hyponatremia. Thiazide held. Hyponatremia improved slowly with IVF, 0.9% NS. Ucx growing klebsiella intermediate to macrobid. Otherwise pansensitive. Switched from ceftriaxone to bactrim.     Hgb dropped to 9 by 10/9 from 12.7 2 days prior. Pt taken cystoscopy 10/9 with urology. No clear evidence of malignancy. Mass thought to have been a clot. bleeding in some areas, biopsied and fulgurated. Hgb stable on the morning of 10/10 with clear urine and no difficulty voiding.     Principal Problem: Gross hematuria    Other Problems Addressed:  Urinary tract infection, hyponatremia, acute blood loss anemia    Procedures Performed: Procedure(s) (LRB):  PYELOGRAM, RETROGRADE (Bilateral)  CYSTOSCOPY (N/A)  REMOVAL, BLOOD CLOT (N/A)  FULGURATION, BLADDER (N/A)  BIOPSY, BLADDER (N/A)    Special Care, Treatment, and Services Provided: none and procedures:  cystoscopy    Consults: Urology    Significant Diagnostic Studies:  No results found for: EF  No results found for: HGBA1C  CBC:   Recent Labs   Lab 10/10/20  0638   WBC 5.94   RBC 2.93*   HGB 9.5*   HCT 28.9*      MCV 99*   MCH 32.4*   MCHC 32.9     BMP:   Recent Labs   Lab 10/10/20  0638 10/10/20  1222     --    *  129* 130*   K 3.5  --      --    CO2 21*  --    BUN 5*  --    CREATININE 0.5  --    CALCIUM 8.0*  --    MG 2.0  --      Microbiology Results (last 7 days)     Procedure Component Value Units Date/Time    Urine culture [[id number]]  (Abnormal)  (Susceptibility) Collected: 10/07/20 3664    Order Status: Completed Specimen: Urine Updated: 10/10/20 1970  "    Urine Culture, Routine KLEBSIELLA PNEUMONIAE  >100,000 cfu/ml      Narrative:      Specimen Source->Urine        Recent Labs   Lab 10/07/20  1149   COLORU Red*   SPECGRAV 1.015   PHUR 6.0   PROTEINUA 2+*   BACTERIA Few*   NITRITE Negative   LEUKOCYTESUR 2+*   HYALINECASTS 0     Cytology, Urine  Order: [id number]  Status:  Final result   Visible to patient:  No (inaccessible in Patient Portal) Next appt:  None  Component 4d ago   Final Pathologic Diagnosis URINE CYTOLOGY:   NO HIGH GRADE UROTHELIAL NEOPLASIA IDENTIFIED (Yamini system)    Comment: Interp By Bobo Mercado M.D., Signed on 10/09/2020 at 15:56   Resulting Agency [facility]         Specimen Collected: 10/07/20 22:59 Last Resulted: 10/09/20 16:24                Final Diagnoses: Same as principal problem.    Discharged Condition: good  Face to face services were provided on 10/10/20   Time Spent:  I spent > 30 minutes on the discharge, which included reviewing hospital course with patient/family, reviewing discharge medications, and arranging follow-up care.  Physical Exam on 10/11/2020:  /69   Pulse 94   Temp 98.6 °F (37 °C) (Oral)   Resp 20   Ht 5' 2" (1.575 m)   Wt 49.9 kg (110 lb 0.2 oz)   SpO2 96%   Breastfeeding No   BMI 20.12 kg/m²       Disposition: Home or Self Care    Follow Up Instructions:   Follow-up Information     Marky García MD In 3 days.    Specialty: Family Medicine  Contact information:  [address] [facility]  Family Medicine & Acupuncture [facility] [id number] [phone]             [facility] UROLOGY. Schedule an appointment as soon as possible for a visit in 1 week.    Specialty: Urology  Contact information:  [address] [facility] [address]  [phone]               No future appointments.    Medications:    Discharge Medication List as of 10/10/2020  1:51 PM      START taking these medications    Details   sulfamethoxazole-trimethoprim 800-160mg (BACTRIM DS) 800-160 mg Tab Take 1 tablet by mouth 2 (two) " times daily. for 5 days, Starting Sat 10/10/2020, Until Thu 10/15/2020, Normal         CONTINUE these medications which have NOT CHANGED    Details   acetaminophen (TYLENOL) 325 MG tablet Take 2 tablets (650 mg total) by mouth every 6 (six) hours as needed., Starting Tue 12/31/2019, Normal      ANTIOX #11/OM3/DHA/EPA/LUT/SATYA (OCUVITE ADULT 50+ ORAL) Take 1 tablet by mouth once daily. , Historical Med      ferrous sulfate 325 (65 FE) MG EC tablet Take 1 tablet (325 mg total) by mouth once daily., Starting Tue 12/31/2019, Normal      gemfibroziL (LOPID) 600 MG tablet Take 600 mg by mouth 2 (two) times daily before meals., Historical Med      simvastatin (ZOCOR) 20 MG tablet Take 20 mg by mouth every evening. , Starting Thu 9/5/2013, Historical Med      POTASSIUM ORAL Take 1 tablet by mouth once daily. OTC, Historical Med      senna-docusate 8.6-50 mg (PERICOLACE) 8.6-50 mg per tablet Take 2 tablets by mouth once daily., Starting Tue 12/31/2019, Normal         STOP taking these medications       amLODIPine (NORVASC) 10 MG tablet Comments:   Reason for Stopping:         hydroCHLOROthiazide (MICROZIDE) 12.5 mg capsule Comments:   Reason for Stopping:         metoprolol tartrate (LOPRESSOR) 25 MG tablet Comments:   Reason for Stopping:               Discharge Instructions:  Discharge Procedure Orders   Basic metabolic panel   Standing Status: Future Standing Exp. Date: 12/09/21   Order Comments: Send result to   Marky García MD  [address] [facility] Medicine & Acupuncture [facility]  [place] [id number] [phone] FAX     CBC auto differential   Standing Status: Future Standing Exp. Date: 12/09/21   Order Comments: Send result to   Marky García MD  [address] [facility] Medicine & Acupuncture [facility]  [place] [id number] [phone] FAX     Ambulatory referral/consult to Urology   Standing Status: Future   Referral Priority: Routine Referral Type: Consultation   Referral Reason: Specialty Services  Required   Requested Specialty: Urology   Number of Visits Requested: 1     Diet Adult Regular     Notify your health care provider if you experience any of the following:  temperature >100.4     Notify your health care provider if you experience any of the following:  persistent nausea and vomiting or diarrhea     Notify your health care provider if you experience any of the following:  severe uncontrolled pain     Notify your health care provider if you experience any of the following:  redness, tenderness, or signs of infection (pain, swelling, redness, odor or green/yellow discharge around incision site)     Notify your health care provider if you experience any of the following:  difficulty breathing or increased cough     Notify your health care provider if you experience any of the following:  severe persistent headache     Notify your health care provider if you experience any of the following:  worsening rash     Notify your health care provider if you experience any of the following:  persistent dizziness, light-headedness, or visual disturbances     Notify your health care provider if you experience any of the following:  increased confusion or weakness     Activity as tolerated         Abrahan Hoff MD  Department of Hospital Medicine

## 2020-10-12 NOTE — PLAN OF CARE
Patient discharged to home.       10/12/20 0735   Final Note   Assessment Type Final Discharge Note   Anticipated Discharge Disposition Home   Right Care Referral Info   Post Acute Recommendation No Care

## 2020-10-14 ENCOUNTER — PATIENT OUTREACH (OUTPATIENT)
Dept: ADMINISTRATIVE | Facility: CLINIC | Age: 85
End: 2020-10-14

## 2020-10-14 DIAGNOSIS — E87.1 HYPONATREMIA: Primary | ICD-10-CM

## 2020-10-14 NOTE — PATIENT INSTRUCTIONS
Discharge Instructions for Hyponatremia  You were diagnosed with hyponatremia, which means your blood level of sodium (salt) is too low. Salt is needed for the body and brain to work. Very low blood levels of sodium can be fatal. Symptoms can include headache, confusion, fatigue, muscle cramps, hallucinations, seizures, and coma. You have been treated to raise your blood levels of sodium. The following instructions will help you care for yourself at home as you have been instructed.  Home care  · Limit your intake of fluids. Drink only the amounts directed by your healthcare provider.  · Ask your healthcare provider what you should use to replace fluids if you are throwing up.  · Keep all follow-up appointments. Your provider needs to watch your condition closely.  To help prevent hyponatremia:  · Take all medicines exactly as directed. Certain medicines can lower blood sodium levels.  · If you have done something that makes you sweat a lot, drink fluids that contain salt and other electrolytes.   · Tell all healthcare providers what medicines you take. Mention all prescription and over-the-counter drugs and herbs.  · Have your sodium levels checked often. This is vital if you take a diuretic (medicine that helps your body get rid of water).  Follow-up  Schedule a follow-up visit as directed.  When to call your healthcare provider  Call your provider right away if you have any of the following:  · Severe tiredness  · Fainting  · Dizziness  · Loss of appetite  · Nausea or vomiting  · Confusion or forgetfullness  · Muscle spasms, cramping, or twitching  · Seizures  · Gait disturbances   Date Last Reviewed: 6/8/2015  © 5673-9304 [employer]. [address] [place] [address], [place] [address]. All rights reserved. This information is not intended as a substitute for professional medical care. Always follow your healthcare professional's instructions.

## 2020-10-16 LAB
FINAL PATHOLOGIC DIAGNOSIS: NORMAL
GROSS: NORMAL
Lab: NORMAL

## 2020-10-20 ENCOUNTER — CARE AT HOME (OUTPATIENT)
Dept: HOME HEALTH SERVICES | Facility: CLINIC | Age: 85
End: 2020-10-20
Payer: MEDICARE

## 2020-10-20 DIAGNOSIS — R31.9 URINARY TRACT INFECTION WITH HEMATURIA, SITE UNSPECIFIED: ICD-10-CM

## 2020-10-20 DIAGNOSIS — R53.81 DEBILITY: Primary | ICD-10-CM

## 2020-10-20 DIAGNOSIS — E87.1 HYPONATREMIA: ICD-10-CM

## 2020-10-20 DIAGNOSIS — E78.5 DYSLIPIDEMIA: ICD-10-CM

## 2020-10-20 DIAGNOSIS — I51.89 DIASTOLIC DYSFUNCTION: ICD-10-CM

## 2020-10-20 DIAGNOSIS — N39.0 URINARY TRACT INFECTION WITH HEMATURIA, SITE UNSPECIFIED: ICD-10-CM

## 2020-10-20 PROCEDURE — 99350 HOME/RES VST EST HIGH MDM 60: CPT | Mod: S$GLB,,, | Performed by: NURSE PRACTITIONER

## 2020-10-20 PROCEDURE — 99350 PR HOME VISIT,ESTAB PATIENT,LEVEL IV: ICD-10-PCS | Mod: S$GLB,,, | Performed by: NURSE PRACTITIONER

## 2020-10-25 VITALS
DIASTOLIC BLOOD PRESSURE: 64 MMHG | RESPIRATION RATE: 18 BRPM | SYSTOLIC BLOOD PRESSURE: 108 MMHG | TEMPERATURE: 99 F | HEART RATE: 78 BPM | OXYGEN SATURATION: 98 %

## 2020-10-25 PROBLEM — E78.5 DYSLIPIDEMIA: Status: ACTIVE | Noted: 2020-10-25

## 2020-10-25 PROBLEM — R53.81 DEBILITY: Status: ACTIVE | Noted: 2020-10-25

## 2020-10-26 NOTE — ASSESSMENT & PLAN NOTE
Appears euvolemic on exam.  Denies chest pain or SOB.  Sent referral for PCP appointment.  Will follow up with patient in 2 weeks.

## 2020-10-26 NOTE — ASSESSMENT & PLAN NOTE
Reports that she completed course of antibiotics.  Denies dysuria, abdominal pain, flank pain, dizziness, weakness or confusion.  I assisted patient to bathroom and noticed blood tinged urine on her pad, no gross hematuria noted.  Spoke with her son via telephone who stated that had been happening intermittently since hospitalization.  Resent ambulatory referral for urology.  Home health to draw labs and fax results to PCP. Will follow up in 2 weeks.

## 2020-10-26 NOTE — PATIENT INSTRUCTIONS
Instructions:  - [facility] Practitioner to schedule home follow-up visit with patient in 2 weeks or as needed.  - Continue all medications, treatments and therapies as ordered.   - Follow all instructions, recommendations as discussed.  - Maintain Safety Precautions at all times.  - Attend all medical appointments as scheduled.  - For worsening symptoms: call Primary Care Physician or Nurse Practitioner.  - For emergencies, call 911 or immediately report to the nearest emergency room.  - Limit Risks of environmental exposure to coronavirus/COVID-19 as discussed including: social distancing, hand hygiene, and limiting departures from the home for necessities only.

## 2020-10-26 NOTE — PROGRESS NOTES
[facility]  Transition of Care Home Visit    Visit Date: 10/25/2020  Encounter Provider: Servando Gilbert NP  PCP:  Maryk García MD    PRESENTING HISTORY      Patient ID: Elisha Young 89 y.o. female.    Consult Requested By:  Lidia Navarrete  Reason for Consult:  Transitional Care Coordination    Chief Complaint: Transitional Care     HPI: Elisha Young is a 89 y.o. female with HTN, HLD, CHF who presents with a chief complaint of vaginal bleeding.  Patient has been suffering from bowel and urinary incontinence over the past month and has been using adult diapers.  One week prior, patient had an episode of bleeding in the diaper that quickly resolved.  She and her son are not exactly sure where the bleeding is coming from but vaginal source was suspected.  Beginning yesterday, patient reports bleeding returned.  It has progressively worsened since last night.  Now noted bright red blood and clots.  Patient is on no anticoagulants or anti-platelet agents.  She reports having hysterectomy years prior.  No weakness, fatigue, SOB, or lightheadedness.  No suprapubic pain, dysuria, increased frequency. No n/v, headache, confusion. She reports sufficient oral intake, but son believes she has been eating less than normal. In ED, straight cath revealed dark bloody urine, so hematuria is now suspected to be the source of bleed. Oliver placed and bladder irrigated. Hb WNL. UA indicated possible UTI, CTX initiated. Also found to have Na 123, Cl 90. No source of vaginal bleed noted on pelvic US. Echogenic nonmobile focus on posterior bladder wall noted on RP u/s, with neoplasm as a consideration. Urology was consulted.     Hospital Course: Patient admitted on 10/7/20. Oliver placed with intermittent irrigation. Pharmacy performed med rec, discovered that pt had been taking thiazide, likely contributor to hyponatremia. Thiazide held. Hyponatremia improved slowly with IVF, 0.9% NS. Ucx growing klebsiella intermediate to  macrobid. Otherwise pansensitive. Switched from ceftriaxone to bactrim. Hgb dropped to 9 by 10/9 from 12.7 2 days prior. Pt taken cystoscopy 10/9 with urology. No clear evidence of malignancy. Mass thought to have been a clot. bleeding in some areas, biopsied and fulgurated. Hgb stable on the morning of 10/10 with clear urine and no difficulty voiding    Today:  Ms. Elisha Young is a 89 y.o. female is being seen and examined at home today for transitional care visit to the home environment post-discharge from inpatient hospitalization encounter described above. Elisha presents at baseline state of health as reported by patient and caregiver. VSS. Denies any acute issues, concerns or complaints to address on today's visit. Reports taking all medications as prescribed. Blood tinged urine noted on examination, son stated that has been going on intermittently since hospitalization, no gross hematuria noted.  Patient also requiring moderate assistance with walker, could benefit from home health PT/OT. No other needs identified at this time. Risks of environmental exposure to coronavirus discussed including: social distancing, hand hygiene, and limiting departures from the home for necessities only.  Reports understanding and willingness to comply.     Attestation: Screening criteria to assess the level of the patient's risk for infection with COVID-19 as recommended by the CDC at the time of the above documented home visit concluded appropriateness to proceed. Universal precautions were maintained at all times, including provider use of >60% alcohol gel hand  immediately prior to entry and upon departing the patient's home as well as cleaning of equipment used in home visit with antibacterial/germicidal disposable wipes.    Admit Date: 10/7/2020   Discharge Date and Time: 10/10/2020  3:00 PM   TCC Referral Date: 10/14/2020    _________________________________________________________________    Review of  Systems   Constitutional: Negative for chills and fever.   HENT: Negative for congestion, postnasal drip and rhinorrhea.    Eyes: Negative for visual disturbance.   Respiratory: Negative for chest tightness and shortness of breath.    Cardiovascular: Negative for chest pain and palpitations.   Gastrointestinal: Negative for abdominal pain, constipation, diarrhea, nausea and vomiting.   Genitourinary: Negative for difficulty urinating and dysuria.   Musculoskeletal: Negative for arthralgias and myalgias.   Skin: Negative for color change and wound.   Neurological: Negative for dizziness, weakness, light-headedness and headaches.   Hematological: Does not bruise/bleed easily.   Psychiatric/Behavioral: Negative for agitation.     Assessments:  · Environmental: single story home, 5 steps to enter, adequate lighting and temeprature control  · Functional Status: Requires assitance with ADL's/IADL's, ambulates with assistance of a cane/walker, incontinent of bowel and bladder  · Safety: Fall Precautions, COVID Precautions/Social Distancing/Mask Use  · Nutritional: Adequate  · Home Health: None  · DME/Supplies: Rolling Walker, BSC, shower chair      PAST HISTORY:     Past Medical History:   Diagnosis Date    CHF (congestive heart failure)     Dyslipidemia     Hyperlipidemia     Hypertension        Past Surgical History:   Procedure Laterality Date    BIOPSY OF BLADDER N/A 10/9/2020    Procedure: BIOPSY, BLADDER;  Surgeon: Leonora Hercules MD;  Location: [address];  Service: Urology;  Laterality: N/A;    BLADDER FULGURATION N/A 10/9/2020    Procedure: FULGURATION, BLADDER;  Surgeon: Leonora Hercules MD;  Location: [address];  Service: Urology;  Laterality: N/A;    CYSTOSCOPY N/A 10/9/2020    Procedure: CYSTOSCOPY;  Surgeon: Leonora Hercules MD;  Location: [address];  Service: Urology;  Laterality: N/A;    HYSTERECTOMY      REMOVAL OF BLOOD CLOT N/A 10/9/2020    Procedure: REMOVAL, BLOOD CLOT;   Surgeon: Leonora Hercules MD;  Location: [facility] OR [address] [place] [address];  Service: Urology;  Laterality: N/A;    RETROGRADE PYELOGRAPHY Bilateral 10/9/2020    Procedure: PYELOGRAM, RETROGRADE;  Surgeon: Leonora Hercules MD;  Location: [facility] OR [address] [place] [address];  Service: Urology;  Laterality: Bilateral;    tonsils         Family History   Problem Relation Age of Onset    Heart disease Father        Social History     Socioeconomic History    Marital status:      Spouse name: Not on file    Number of children: Not on file    Years of education: Not on file    Highest education level: Not on file   Occupational History    Not on file   Social Needs    Financial resource strain: Not on file    Food insecurity     Worry: Not on file     Inability: Not on file    Transportation needs     Medical: Not on file     Non-medical: Not on file   Tobacco Use    Smoking status: Never Smoker    Smokeless tobacco: Never Used   Substance and Sexual Activity    Alcohol use: No    Drug use: Never    Sexual activity: Not on file   Lifestyle    Physical activity     Days per week: Not on file     Minutes per session: Not on file    Stress: Not on file   Relationships    Social connections     Talks on phone: Not on file     Gets together: Not on file     Attends Mosque service: Not on file     Active member of club or organization: Not on file     Attends meetings of clubs or organizations: Not on file     Relationship status: Not on file   Other Topics Concern    Not on file   Social History Narrative    Not on file       MEDICATIONS & ALLERGIES:     Current Outpatient Medications on File Prior to Visit   Medication Sig Dispense Refill    acetaminophen (TYLENOL) 325 MG tablet Take 2 tablets (650 mg total) by mouth every 6 (six) hours as needed. 120 tablet 0    ANTIOX #11/OM3/DHA/EPA/LUT/SATYA (OCUVITE ADULT 50+ ORAL) Take 1 tablet by mouth once daily.       ferrous sulfate 325 (65 FE) MG EC tablet Take 1 tablet (325 mg total)  by mouth once daily. 30 tablet 3    gemfibroziL (LOPID) 600 MG tablet Take 600 mg by mouth 2 (two) times daily before meals.      POTASSIUM ORAL Take 1 tablet by mouth once daily. OTC      senna-docusate 8.6-50 mg (PERICOLACE) 8.6-50 mg per tablet Take 2 tablets by mouth once daily. (Patient not taking: Reported on 10/14/2020) 30 tablet 0    simvastatin (ZOCOR) 20 MG tablet Take 20 mg by mouth every evening.        No current facility-administered medications on file prior to visit.         Review of patient's allergies indicates:  No Known Allergies    OBJECTIVE:     Vital Signs:  Vitals:    10/20/20 0230   BP: 108/64   Pulse: 78   Resp: 18   Temp: 98.7 °F (37.1 °C)     There is no height or weight on file to calculate BMI.       Physical Exam  Constitutional:       Appearance: Normal appearance.   HENT:      Head: Normocephalic and atraumatic.      Nose: No congestion or rhinorrhea.      Mouth/Throat:      Mouth: Mucous membranes are moist.   Eyes:      Extraocular Movements: Extraocular movements intact.   Neck:      Musculoskeletal: Normal range of motion and neck supple.   Cardiovascular:      Rate and Rhythm: Normal rate.   Pulmonary:      Breath sounds: Normal breath sounds.   Abdominal:      General: Bowel sounds are normal.   Genitourinary:     Comments: Blood-tinge urine noted on examination, no gross hematuria  Musculoskeletal:         General: No swelling or tenderness.   Skin:     General: Skin is warm and dry.   Neurological:      General: No focal deficit present.      Mental Status: She is alert and oriented to person, place, and time.   Psychiatric:         Mood and Affect: Mood normal.       Laboratory  Lab Results   Component Value Date    WBC 5.94 10/10/2020    HGB 9.5 (L) 10/10/2020    HCT 28.9 (L) 10/10/2020    MCV 99 (H) 10/10/2020     10/10/2020     Lab Results   Component Value Date    INR 0.9 10/07/2020    INR 1.0 12/26/2019     No results found for: HGBA1C  No results for  input(s): POCTGLUCOSE in the last 72 hours.    TRANSITION OF CARE:     [facility] Contact Note:    Family and/or Caretaker present at visit?  No.  Diagnostic tests reviewed/disposition: No diagnosic tests pending after this hospitalization.  Disease/illness education: Importance of Compliance with all prescribed medications and treatments, COVID Precautions/Social Distancing/Mask Use  Home health/community services discussion/referrals: Patient does not have home health established from hospital visit.  They do need home health.  If needed, we will set up home health for the patient.   Establishment or re-establishment of referral orders for community resources: No other necessary community resources.   Discussion with other health care providers: No discussion with other health care providers necessary.     Transition of Care Visit:  I have reviewed and updated the history and problem list. I have reconciled the medication list. I have discussed the hospitalization and current medical issues, prognosis and plans with the patient/family.     Medications Reconciliation:   I have reconciled the patient's home medications and discharge medications with the patient/family. I have updated all changes. Refer to After-Visit Medication List.    Discharge plans, follow-up instructions, future appointments, provider contact information, indicators to seek emergency treatment and encouragement to call for any questions, concerns or clarification of the patient's plan of care explained to patient and/or caregiver(s), whom confirm understanding of provided information and endorse willingness to comply.     ASSESSMENT & PLAN:     HIGH RISK CONDITION(S):  Patient has a condition that poses threat to life and bodily function: Congestive Heart Failure, UTI with Hematuria    Elisha was seen today for transitional care.    Diagnoses and all orders for this visit:        Problem List Items Addressed This Visit         Cardiac/Vascular    Diastolic dysfunction     Appears euvolemic on exam.  Denies chest pain or SOB.  Sent referral for PCP appointment.  Will follow up with patient in 2 weeks.          Dyslipidemia     Denies chest pain or SOB.   Continue medication as prescribed.             Renal/    Hyponatremia     Reports adequate nutrition.  No signs of confusion or worsening weakness since hospitalization.          UTI (urinary tract infection)     Reports that she completed course of antibiotics.  Denies dysuria, abdominal pain, flank pain, dizziness, weakness or confusion.  I assisted patient to bathroom and noticed blood tinged urine on her pad, no gross hematuria noted.  Spoke with her son via telephone who stated that had been happening intermittently since hospitalization.  Resent ambulatory referral for urology.  Home health to draw labs and fax results to PCP. Will follow up in 2 weeks.          Relevant Orders    Ambulatory referral/consult to Urology       Other    Debility - Primary    Relevant Orders    Ambulatory referral/consult to Home Health    Ambulatory referral/consult to Outpatient Case Management          Encounter for Support and Coordination of Transition of Care   - [facility] Nurse Practitioner to schedule home visit with patient  in 2 weeks  Were controlled substances prescribed?  No  Instructions:  - [facility] Nurse Practitioner to schedule home follow-up visit with patient in 2 weeks or as needed.  - Continue all medications, treatments and therapies as ordered.   - Follow all instructions, recommendations as discussed.  - Maintain Safety Precautions at all times.  - Attend all medical appointments as scheduled.  - For worsening symptoms: call Primary Care Physician or Nurse Practitioner.  - For emergencies, call 911 or immediately report to the nearest emergency room.  - Limit Risks of environmental exposure to coronavirus/COVID-19 as discussed including: social distancing, hand  hygiene, and limiting departures from the home for necessities only.      Scheduled Follow-up :  No future appointments.    After Visit Medication List :     Medication List          Accurate as of October 20, 2020 11:59 PM. If you have any questions, ask your nurse or doctor.            CONTINUE taking these medications    acetaminophen 325 MG tablet  Commonly known as: TYLENOL  Take 2 tablets (650 mg total) by mouth every 6 (six) hours as needed.     ferrous sulfate 325 (65 FE) MG EC tablet  Take 1 tablet (325 mg total) by mouth once daily.     gemfibroziL 600 MG tablet  Commonly known as: LOPID     OCUVITE ADULT 50+ ORAL     POTASSIUM ORAL     senna-docusate 8.6-50 mg 8.6-50 mg per tablet  Commonly known as: PERICOLACE  Take 2 tablets by mouth once daily.     simvastatin 20 MG tablet  Commonly known as: ZOCOR            Patient consent was obtained prior to treatment on this visit.    Attestation: Screening criteria to assess the level of the patient's risk for infection with COVID-19 as recommended by the CDC at the time of the above documented home visit concluded appropriateness to proceed. Universal precautions were maintained at all times, including provider use of >60% alcohol gel hand  immediately prior to entry and upon departing the patient's home as well as cleaning of equipment used in home visit with antibacterial/germicidal disposable wipes.     Signature:       N'Jeri Dixon, APRN FNP-C [facility]    Total Face-to-Face Encounter: 60 minutes with >50% of time spent discussing the care with the patient/family.

## 2020-10-30 ENCOUNTER — OUTPATIENT CASE MANAGEMENT (OUTPATIENT)
Dept: ADMINISTRATIVE | Facility: OTHER | Age: 85
End: 2020-10-30

## 2020-10-30 NOTE — LETTER
November 6, 2020             Dear Ms. Young,      Welcome to [facility].  It was a pleasure talking with you today.  My name is Bailee Seth RN and I look forward to being your Care Manager.  My goal is to help you function at the healthiest and highest level possible.  You can contact me directly at [phone].    As an [facility] patient, some of the services we may be able to provide include:      Development of an individualized care plan with a Registered Nurse    Connection with a    Connection with available resources and services     Coordinate communication among your care team members    Provide coaching and education    Help you understand your doctors treatment plan   Help you obtain information about your insurance coverage.     All services provided by [facility] Complex Care Managers and other care team members are coordinated with and communicated to your primary care team.      As part of your enrollment, you will be receiving education materials and more information about these services in your [facility] account, by phone or through the mail.  If you do not wish to participate or receive information, please contact our office at [phone].      Sincerely,        Bailee Seth RN  [facility]   Out-patient RN Complex Care Manager

## 2020-11-03 ENCOUNTER — CARE AT HOME (OUTPATIENT)
Dept: HOME HEALTH SERVICES | Facility: CLINIC | Age: 85
End: 2020-11-03
Payer: MEDICARE

## 2020-11-03 DIAGNOSIS — D64.9 ANEMIA, UNSPECIFIED TYPE: ICD-10-CM

## 2020-11-03 DIAGNOSIS — I51.89 DIASTOLIC DYSFUNCTION: ICD-10-CM

## 2020-11-03 DIAGNOSIS — E78.5 DYSLIPIDEMIA: Primary | ICD-10-CM

## 2020-11-03 DIAGNOSIS — N30.01 ACUTE CYSTITIS WITH HEMATURIA: ICD-10-CM

## 2020-11-03 DIAGNOSIS — R53.81 DEBILITY: ICD-10-CM

## 2020-11-03 PROCEDURE — 99350 HOME/RES VST EST HIGH MDM 60: CPT | Mod: S$GLB,,, | Performed by: NURSE PRACTITIONER

## 2020-11-03 PROCEDURE — 99350 PR HOME VISIT,ESTAB PATIENT,LEVEL IV: ICD-10-PCS | Mod: S$GLB,,, | Performed by: NURSE PRACTITIONER

## 2020-11-04 ENCOUNTER — TELEPHONE (OUTPATIENT)
Dept: UROLOGY | Facility: CLINIC | Age: 85
End: 2020-11-04

## 2020-11-06 VITALS
OXYGEN SATURATION: 99 % | TEMPERATURE: 98 F | DIASTOLIC BLOOD PRESSURE: 89 MMHG | HEART RATE: 91 BPM | SYSTOLIC BLOOD PRESSURE: 129 MMHG | RESPIRATION RATE: 18 BRPM

## 2020-11-06 NOTE — PATIENT INSTRUCTIONS
Instructions:  - [facility] Pract[facility] to schedule home follow-up visit with patient in 6 weeks or as needed.  - Continue all medications, treatments and therapies as ordered.   - Follow all instructions, recommendations as discussed.  - Maintain Safety Precautions at all times.  - Attend all medical appointments as scheduled.  - For worsening symptoms: call Primary Care Physician or Nurse Practitioner.  - For emergencies, call 911 or immediately report to the nearest emergency room.  - Limit Risks of environmental exposure to coronavirus/COVID-19 as discussed including: social distancing, hand hygiene, and limiting departures from the home for necessities only.

## 2020-11-06 NOTE — ASSESSMENT & PLAN NOTE
Continue statin as prescribed.   Referral sent for outpatient case management to establish care with [facility] P[facility] on [address] which is much closer to home.

## 2020-11-06 NOTE — PROGRESS NOTES
Outpatient Care Management  Initial Patient Assessment    Patient: Elisha Young  MRN: [id number]  Date of Service: 11/6/2020  Completed by: Bailee Seth RN  Referral Date: 11/06/2020  Program: Case Management (High Risk)    Reason for Visit   Patient presents with    [facility] Chart Review     11/5/20    [facility] Enrollment Call     11/6/20     Initial Assessment    PHQ-9    Plan Of Care       Brief Summary: 11/6/2020 - Received referral from Dr. Gilbert for pt having dyslipidemia, diastolic dysfunction, and anemia. Pt lives alone in a duplex home in [place], [place]; pt's son lives in the [facility] door. Pt is ambulatory per walker/rollator, and independent with most ADLs; pt is dependent in regards to cleaning home, shopping, attending medical appts, and paying bills. Pt was admitted to hospital on 10/7/20 due to acute cystitis with hematuria, iron deficiency anemia, and hyponatremia; pt was discharged with iron prescription which she has started. Pt was also found to have a UTI for which she was discharged on antibiotics; pt completed antibiotics. Pt is incontinent of bowel and bladder and wears adult briefs. Pt states she consumes a low sodium diet. Pt currently receiving  and [facility]. Pt is being referred to [facility] LCSW for assist with advanced care directives, medical transportation, and paying her [facility] bill.          Assessment Documentation     [facility] Initial Assessment    Involvement of Care  Do I have permission to speak with other family members about your care?: No  Assessment completed by: Patient  Identified Areas of Need  Advanced Care Planning: Yes  Housing: no  Medication Adherence: No  *Active medication list was reviewed and reconciled with patient and/or caregiver:   Nutrition: no  Lab Adherence: no  Depression: No  Cognitive/Behavioral Health: no  Communication: no  Health Literacy: no  Fall risk?: Yes  Equipment/Supplies/Services: no          Problem List and History      Problems Addressed This Visit    Atrial Fibrillation: Not identified by patient as current problem  Hypertension: Not identified by patient as current problem  Anemia: Not identified by patient as current problem  Hyperlipidemia: Not identified by patient as current problem  UTI: Identified as current problem  Heart Disease: Not identified by patient as current problem         Reviewed medical and social history with patient and/or caregiver. A complex care plan was discussed and completed today, with input from patient and/or caregiver.    Patient Instructions     Instructions were provided via the [facility] patient resources and are available for the patient to view on the patient portal, if active.    Next steps: F/U concerning finding new PCP  F/U concerning managing s/s of anemia and preventing complications of anemia.     Follow up in about 2 weeks (around 11/13/2020).    Todays [facility] Self-Management Care Plan was developed with the patients/caregivers input and was based on identified barriers from todays assessment.  Goals were written today with the patient/caregiver and the patient has agreed to work towards these goals to improve his/her overall well-being. Patient verbalized understanding of the care plan, goals, and all of today's instructions. Encouraged patient/caregiver to communicate with his/her physician and health care team about health conditions and the treatment plan.  Provided my contact information today and encouraged patient/caregiver to call me with any questions as needed.

## 2020-11-06 NOTE — PROGRESS NOTES
[facility]  Medical Home Visit    Visit Date: 11/3/2020  Encounter Provider: Servando Gilbert NP  PCP:  Marky García MD    PRESENTING HISTORY      Patient ID: Elisha Young 89 y.o. female.    Consult Requested By:  No ref. provider found  Reason for Consult: Medical follow up  Chief Complaint: Medical follow up    HPI: Elisha Young is a 89 y.o. female with HTN, HLD, CHF. Patient being seen for medical follow up.     Today:  Ms. Elisha Young is a 89 y.o. female is being seen and examined at home today for medical follow up visit.  Elisha presents at baseline state of health as reported by patient and caregiver. VSS. Denies any acute issues, concerns or complaints to address on today's visit. Denies hematuria.  Reports that she has not been taking iron pills.  Instructed patient to take medications as prescribed.  Patient verbalized understanding. Sitter is present during visit.  Patient reports that home health has been coming to home also.  Patient currently requiring minimal assistance with walker. No other needs identified at this time. Risks of environmental exposure to coronavirus discussed including: social distancing, hand hygiene, and limiting departures from the home for necessities only.  Reports understanding and willingness to comply.     Attestation: Screening criteria to assess the level of the patient's risk for infection with COVID-19 as recommended by the CDC at the time of the above documented home visit concluded appropriateness to proceed. Universal precautions were maintained at all times, including provider use of >60% alcohol gel hand  immediately prior to entry and upon departing the patient's home as well as cleaning of equipment used in home visit with antibacterial/germicidal disposable wipes.        _________________________________________________________________    Review of Systems   Constitutional: Negative for chills and fever.   HENT: Negative for congestion,  postnasal drip and rhinorrhea.    Eyes: Negative for visual disturbance.   Respiratory: Negative for chest tightness and shortness of breath.    Cardiovascular: Negative for chest pain and palpitations.   Gastrointestinal: Negative for abdominal pain, constipation, diarrhea, nausea and vomiting.   Genitourinary: Negative for difficulty urinating and dysuria.   Musculoskeletal: Negative for arthralgias and myalgias.   Skin: Negative for color change and wound.   Neurological: Negative for dizziness, weakness, light-headedness and headaches.   Hematological: Does not bruise/bleed easily.   Psychiatric/Behavioral: Negative for agitation.     Assessments:  · Environmental: single story home, 5 steps to enter, adequate lighting and temeprature control  · Functional Status: Requires assitance with ADL's/IADL's, ambulates with assistance of a cane/walker, incontinent of bowel and bladder  · Safety: Fall Precautions, COVID Precautions/Social Distancing/Mask Use  · Nutritional: Adequate  · [facility]  · DME/Supplies: Rolling Walker, BSC, shower chair      PAST HISTORY:     Past Medical History:   Diagnosis Date    CHF (congestive heart failure)     Dyslipidemia     Hyperlipidemia     Hypertension        Past Surgical History:   Procedure Laterality Date    BIOPSY OF BLADDER N/A 10/9/2020    Procedure: BIOPSY, BLADDER;  Surgeon: Leonora Hercules MD;  Location: [address];  Service: Urology;  Laterality: N/A;    BLADDER FULGURATION N/A 10/9/2020    Procedure: FULGURATION, BLADDER;  Surgeon: Leonora Hercules MD;  Location: [address];  Service: Urology;  Laterality: N/A;    CYSTOSCOPY N/A 10/9/2020    Procedure: CYSTOSCOPY;  Surgeon: Leonora Hercules MD;  Location: [address];  Service: Urology;  Laterality: N/A;    HYSTERECTOMY      REMOVAL OF BLOOD CLOT N/A 10/9/2020    Procedure: REMOVAL, BLOOD CLOT;  Surgeon: Leonora Hercules MD;  Location: [address];  Service:  Urology;  Laterality: N/A;    RETROGRADE PYELOGRAPHY Bilateral 10/9/2020    Procedure: PYELOGRAM, RETROGRADE;  Surgeon: Leonora Hercules MD;  Location: [facility] OR [address] [place] [address];  Service: Urology;  Laterality: Bilateral;    tonsils         Family History   Problem Relation Age of Onset    Heart disease Father        Social History     Socioeconomic History    Marital status:      Spouse name: Not on file    Number of children: 3    Years of education: Not on file    Highest education level: Not on file   Occupational History    Not on file   Social Needs    Financial resource strain: Not hard at all    Food insecurity     Worry: Never true     Inability: Never true    Transportation needs     Medical: Yes     Non-medical: Yes   Tobacco Use    Smoking status: Never Smoker    Smokeless tobacco: Never Used   Substance and Sexual Activity    Alcohol use: No    Drug use: Never    Sexual activity: Not Currently   Lifestyle    Physical activity     Days per week: 0 days     Minutes per session: 0 min    Stress: Only a little   Relationships    Social connections     Talks on phone: More than three times a week     Gets together: More than three times a week     Attends Judaism service: 1 to 4 times per year     Active member of club or organization: No     Attends meetings of clubs or organizations: Never     Relationship status:    Other Topics Concern    Not on file   Social History Narrative    Not on file       MEDICATIONS & ALLERGIES:     Current Outpatient Medications on File Prior to Visit   Medication Sig Dispense Refill    acetaminophen (TYLENOL) 325 MG tablet Take 2 tablets (650 mg total) by mouth every 6 (six) hours as needed. 120 tablet 0    ANTIOX #11/OM3/DHA/EPA/LUT/SATYA (OCUVITE ADULT 50+ ORAL) Take 1 tablet by mouth once daily.       ferrous sulfate 325 (65 FE) MG EC tablet Take 1 tablet (325 mg total) by mouth once daily. 30 tablet 3    gemfibroziL (LOPID) 600 MG tablet  Take 600 mg by mouth 2 (two) times daily before meals.      POTASSIUM ORAL Take 1 tablet by mouth once daily. OTC      senna-docusate 8.6-50 mg (PERICOLACE) 8.6-50 mg per tablet Take 2 tablets by mouth once daily. 30 tablet 0    simvastatin (ZOCOR) 20 MG tablet Take 20 mg by mouth every evening.        No current facility-administered medications on file prior to visit.         Review of patient's allergies indicates:  No Known Allergies    OBJECTIVE:     Vital Signs:  Vitals:    11/03/20 1400   BP: 129/89   Pulse: 91   Resp: 18   Temp: 98.2 °F (36.8 °C)     There is no height or weight on file to calculate BMI.       Physical Exam  Constitutional:       Appearance: Normal appearance.   HENT:      Head: Normocephalic and atraumatic.      Nose: No congestion or rhinorrhea.      Mouth/Throat:      Mouth: Mucous membranes are moist.   Eyes:      Extraocular Movements: Extraocular movements intact.   Neck:      Musculoskeletal: Normal range of motion and neck supple.   Cardiovascular:      Rate and Rhythm: Normal rate.   Pulmonary:      Breath sounds: Normal breath sounds.   Abdominal:      General: Bowel sounds are normal.   Musculoskeletal:         General: No swelling or tenderness.   Skin:     General: Skin is warm and dry.   Neurological:      General: No focal deficit present.      Mental Status: She is alert and oriented to person, place, and time.   Psychiatric:         Mood and Affect: Mood normal.       Laboratory  Lab Results   Component Value Date    WBC 5.94 10/10/2020    HGB 9.5 (L) 10/10/2020    HCT 28.9 (L) 10/10/2020    MCV 99 (H) 10/10/2020     10/10/2020     Lab Results   Component Value Date    INR 0.9 10/07/2020    INR 1.0 12/26/2019     No results found for: HGBA1C  No results for input(s): POCTGLUCOSE in the last 72 hours.      Family and/or Caretaker present at visit?  No.  Diagnostic tests reviewed/disposition: No diagnosic tests pending after this hospitalization.  Disease/illness  education: Importance of Compliance with all prescribed medications and treatments, COVID Precautions/Social Distancing/Mask Use  Home health/community services discussion/referrals: Patient does have home health established with [facility].    Establishment or re-establishment of referral orders for community resources: No other necessary community resources.   Discussion with other health care providers: No discussion with other health care providers necessary.     Medications Reconciliation:   I have reconciled the patient's home medications and discharge medications with the patient/family. I have updated all changes. Refer to After-Visit Medication List.    Discharge plans, follow-up instructions, future appointments, provider contact information, indicators to seek emergency treatment and encouragement to call for any questions, concerns or clarification of the patient's plan of care explained to patient and/or caregiver(s), whom confirm understanding of provided information and endorse willingness to comply.     ASSESSMENT & PLAN:     HIGH RISK CONDITION(S):  Patient has a condition that poses threat to life and bodily function: Congestive Heart Failure, UTI with Hematuria    Elisha was seen today for transitional care.    Diagnoses and all orders for this visit:        Problem List Items Addressed This Visit        Cardiac/Vascular    Diastolic dysfunction     Denies chest pain or SOB.  Appears euvolemic on exam.          Relevant Orders    Ambulatory referral/consult to Outpatient Case Management    Dyslipidemia - Primary     Continue statin as prescribed.   Referral sent for outpatient case management to establish care with [facility] on [address] which is much closer to home.          Relevant Orders    Ambulatory referral/consult to Outpatient Case Management       Renal/    RESOLVED: Acute cystitis with hematuria     Reports hematuria has resolved.  Keep upcoming follow up appointment  with urology.             Oncology    Anemia     Continue to take iron pills as prescribed         Relevant Orders    Ambulatory referral/consult to Outpatient Case Management       Other    Debility     Continue home health PT/OT               Encounter for Support and Coordination of Transition of Care   - [facility] Nurse Practitioner to schedule home visit with patient  in 2 weeks  Were controlled substances prescribed?  No  Instructions:  - [facility] Nurse Practitioner to schedule home follow-up visit with patient in 6 weeks or as needed.  - Continue all medications, treatments and therapies as ordered.   - Follow all instructions, recommendations as discussed.  - Maintain Safety Precautions at all times.  - Attend all medical appointments as scheduled.  - For worsening symptoms: call Primary Care Physician or Nurse Practitioner.  - For emergencies, call 911 or immediately report to the nearest emergency room.  - Limit Risks of environmental exposure to coronavirus/COVID-19 as discussed including: social distancing, hand hygiene, and limiting departures from the home for necessities only.      Scheduled Follow-up :  Future Appointments   Date Time Provider Department Center   11/17/2020  1:40 PM Patricia De Dios PA-C [facility] UROLOGY [facility]       After Visit Medication List :     Medication List          Accurate as of November 3, 2020 11:59 PM. If you have any questions, ask your nurse or doctor.            CONTINUE taking these medications    acetaminophen 325 MG tablet  Commonly known as: TYLENOL  Take 2 tablets (650 mg total) by mouth every 6 (six) hours as needed.     ferrous sulfate 325 (65 FE) MG EC tablet  Take 1 tablet (325 mg total) by mouth once daily.     gemfibroziL 600 MG tablet  Commonly known as: LOPID     OCUVITE ADULT 50+ ORAL     POTASSIUM ORAL     senna-docusate 8.6-50 mg 8.6-50 mg per tablet  Commonly known as: PERICOLACE  Take 2 tablets by mouth once daily.     simvastatin 20 MG  tablet  Commonly known as: ZOCOR            Patient consent was obtained prior to treatment on this visit.    Attestation: Screening criteria to assess the level of the patient's risk for infection with COVID-19 as recommended by the CDC at the time of the above documented home visit concluded appropriateness to proceed. Universal precautions were maintained at all times, including provider use of >60% alcohol gel hand  immediately prior to entry and upon departing the patient's home as well as cleaning of equipment used in home visit with antibacterial/germicidal disposable wipes.     Signature:       MELISA SinghP-C   [facility]    Total Face-to-Face Encounter: 60 minutes with >50% of time spent discussing the care with the patient/family.

## 2020-11-13 ENCOUNTER — OUTPATIENT CASE MANAGEMENT (OUTPATIENT)
Dept: ADMINISTRATIVE | Facility: OTHER | Age: 85
End: 2020-11-13

## 2020-11-17 ENCOUNTER — OFFICE VISIT (OUTPATIENT)
Dept: UROLOGY | Facility: CLINIC | Age: 85
End: 2020-11-17
Payer: MEDICARE

## 2020-11-17 VITALS
SYSTOLIC BLOOD PRESSURE: 135 MMHG | BODY MASS INDEX: 16.38 KG/M2 | DIASTOLIC BLOOD PRESSURE: 79 MMHG | HEART RATE: 93 BPM | HEIGHT: 62 IN | WEIGHT: 89 LBS

## 2020-11-17 DIAGNOSIS — R82.71 ASYMPTOMATIC BACTERIURIA: Primary | ICD-10-CM

## 2020-11-17 PROCEDURE — 99999 PR PBB SHADOW E&M-EST. PATIENT-LVL III: ICD-10-PCS | Mod: PBBFAC,,, | Performed by: PHYSICIAN ASSISTANT

## 2020-11-17 PROCEDURE — 99213 OFFICE O/P EST LOW 20 MIN: CPT | Mod: S$PBB,,, | Performed by: PHYSICIAN ASSISTANT

## 2020-11-17 PROCEDURE — 99999 PR PBB SHADOW E&M-EST. PATIENT-LVL III: CPT | Mod: PBBFAC,,, | Performed by: PHYSICIAN ASSISTANT

## 2020-11-17 PROCEDURE — 99213 PR OFFICE/OUTPT VISIT, EST, LEVL III, 20-29 MIN: ICD-10-PCS | Mod: S$PBB,,, | Performed by: PHYSICIAN ASSISTANT

## 2020-11-17 PROCEDURE — 99213 OFFICE O/P EST LOW 20 MIN: CPT | Mod: PBBFAC | Performed by: PHYSICIAN ASSISTANT

## 2020-11-17 NOTE — PROGRESS NOTES
CHIEF COMPLAINT:    Mrs. Young is a 89 y.o. female presenting for hematuria.  PRESENTING ILLNESS:    Elisha Young is a 89 y.o. female who presents for hematuria.     She does not have any urinary complaints today. She reports that the hematuria has resolved.  She denies dysuria.   She only has frequency if she drinks a lot of fluids.  She has urgency and sometimes urge incontinence. She wears diapers and pads.  She usually changes them once, when she goes to bed unless she experiences incontinence.    No bladder pain.    She presented to the ER 10/7/20 with painless gross hematuria with clots that began the day before. At that time she reported no suprapubic pain, dysuria, increased frequency, or urgency. She reported incontinence of urine and stool for the past couple months. She goes through multiple diapers daily. A ramos catheter was placed in the ER and multiple clots irrigated out.   She is on iron supplements and therefore takes stool softener for constipation.      On evaluation, she was afebrile with stable vitals. No distress. UA with >100W, >100R, few bacteria, nit neg. Cr at baseline. RP US with no hydronephrosis. A 16 Fr ramos catheter was in place draining bloody urine.     She is not on anticoagulation. No smoking history. She reports having hysterectomy many years ago. No personal or family history of  malignancy.    Urine culture was Klebsiella pneumoniae.  She was switched from ceftriaxone to bactrim.     Hematuria workup:    Renal u/s 10/7/20: right kidney: No solid mass.  No stone visualized.  No hydronephrosis.There is right midpole simple renal cyst measuring 1.2 x 1.0 x 1.2 cm.  Left kidney:  No mass. No stone visualized.  No hydronephrosis.     The bladder demonstrates mobile echogenic debris from manual filling of the bladder via Ramos catheter.  There is echogenic nonmobile nonvascular lesion along the posterior wall of the bladder.       Procedure(s) Performed 10/9/20:   1.   Cystoscopy with bladder biopsy and fulguration  2.  Bilateral retrograde pyelogram  3.  Clot evacuation    Specimen(s):   Bladder biopsies   Pathology:  MODERATELY INTENSE CHRONIC CYSTITIS  NO NEOPLASIA IDENTIFIED  NO MUSCULARIS LAYER IDENTIFIED FOR EVALUATION  Staff Surgeon: Leonora Hercules MD   Indications: Elisha Young is a 89 y.o. female with gross hematuria. She presents today for cystoscopy with bladder biopsy and fulguration, and bilateral retrograde pyelograms.   Findings:   - Bladder hyperemic and irritated with areas of heaped up mucosa, bleeding in some areas, biopsied and fulgurated   - Bilateral retrograde pyelograms unremarkable         REVIEW OF SYSTEMS:      Constitutional: Negative for fever and chills.   HENT: Negative for hearing loss.   Eyes: Negative for visual disturbance.   Respiratory: Negative for shortness of breath.   Cardiovascular: Negative for chest pain.   Gastrointestinal: Negative for vomiting, and constipation.   Genitourinary:  See HPI  Neurological: Negative for dizziness.   Hematological: Does not bruise/bleed easily.   Psychiatric/Behavioral: Negative for confusion.     PATIENT HISTORY:    Past Medical History:   Diagnosis Date    CHF (congestive heart failure)     Dyslipidemia     Hyperlipidemia     Hypertension        Past Surgical History:   Procedure Laterality Date    BIOPSY OF BLADDER N/A 10/9/2020    Procedure: BIOPSY, BLADDER;  Surgeon: Leonora Hercules MD;  Location: [address];  Service: Urology;  Laterality: N/A;    BLADDER FULGURATION N/A 10/9/2020    Procedure: FULGURATION, BLADDER;  Surgeon: Leonora Hercules MD;  Location: [address];  Service: Urology;  Laterality: N/A;    CYSTOSCOPY N/A 10/9/2020    Procedure: CYSTOSCOPY;  Surgeon: Leonora Hercules MD;  Location: [facility] OR [address] [place] [address];  Service: Urology;  Laterality: N/A;    HYSTERECTOMY      REMOVAL OF BLOOD CLOT N/A 10/9/2020    Procedure: REMOVAL, BLOOD CLOT;  Surgeon: Leonora Hercules MD;   Location: [facility] OR [address] [place] [address];  Service: Urology;  Laterality: N/A;    RETROGRADE PYELOGRAPHY Bilateral 10/9/2020    Procedure: PYELOGRAM, RETROGRADE;  Surgeon: Leonora Hercules MD;  Location: [facility] OR [address] [place] [address];  Service: Urology;  Laterality: Bilateral;    tonsils         Family History   Problem Relation Age of Onset    Heart disease Father        Social History     Socioeconomic History    Marital status:      Spouse name: Not on file    Number of children: 3    Years of education: Not on file    Highest education level: Not on file   Occupational History    Not on file   Social Needs    Financial resource strain: Not hard at all    Food insecurity     Worry: Never true     Inability: Never true    Transportation needs     Medical: Yes     Non-medical: Yes   Tobacco Use    Smoking status: Never Smoker    Smokeless tobacco: Never Used   Substance and Sexual Activity    Alcohol use: No    Drug use: Never    Sexual activity: Not Currently   Lifestyle    Physical activity     Days per week: 0 days     Minutes per session: 0 min    Stress: Only a little   Relationships    Social connections     Talks on phone: More than three times a week     Gets together: More than three times a week     Attends Yarsanism service: 1 to 4 times per year     Active member of club or organization: No     Attends meetings of clubs or organizations: Never     Relationship status:    Other Topics Concern    Not on file   Social History Narrative    Not on file       Allergies:  Patient has no known allergies.    Medications:    Current Outpatient Medications:     ANTIOX #11/OM3/DHA/EPA/LUT/SATYA (OCUVITE ADULT 50+ ORAL), Take 1 tablet by mouth once daily. , Disp: , Rfl:     ferrous sulfate 325 (65 FE) MG EC tablet, Take 1 tablet (325 mg total) by mouth once daily., Disp: 30 tablet, Rfl: 3    senna-docusate 8.6-50 mg (PERICOLACE) 8.6-50 mg per tablet, Take 2 tablets by mouth once daily., Disp: 30 tablet,  Rfl: 0    acetaminophen (TYLENOL) 325 MG tablet, Take 2 tablets (650 mg total) by mouth every 6 (six) hours as needed. (Patient not taking: Reported on 11/17/2020), Disp: 120 tablet, Rfl: 0    gemfibroziL (LOPID) 600 MG tablet, Take 600 mg by mouth 2 (two) times daily before meals., Disp: , Rfl:     POTASSIUM ORAL, Take 1 tablet by mouth once daily. OTC, Disp: , Rfl:     simvastatin (ZOCOR) 20 MG tablet, Take 20 mg by mouth every evening. , Disp: , Rfl:     PHYSICAL EXAMINATION:    Constitutional: She appears well-developed and well-nourished.  She is in no apparent distress.    Eyes: No scleral icterus noted bilaterally. No discharge bilaterally.    Nose: No rhinorrhea    Cardiovascular: Normal rate.  No pitting edema noted in lower extremities bilaterally    Pulmonary/Chest: Effort normal. No respiratory distress.     Abdominal:  She exhibits no distension.  There is no CVA tenderness.     Lymphadenopathy:          Right: No supraclavicular adenopathy present.        Left: No supraclavicular adenopathy present.     Neurological: She is alert and oriented to person, place, and time.     Skin: Skin is warm and dry.     Psych: Cooperative with normal affect.    Physical Exam      LABS:    U/a: 1.005, pH 6, + leuks, + nitrite, tr blood, otherwise negative.     IMPRESSION:    Encounter Diagnoses   Name Primary?    Asymptomatic bacteriuria Yes       PLAN:    Urine today nitrite positive.  However, patient is asymptomatic. Discussed that is is not recommended to treat with antibiotics in the absence of UTI symptoms.    Recommend sending a urine sample for culture only if patient becomes symptomatic.     Follow up as needed.      Patricia De Dios PA-C

## 2020-11-17 NOTE — LETTER
November 17, 2020      Sakshi Gilbert, FRANK  [address] [place] [address]           [facility] [address]  [address]  [facility] [address]  Phone: [phone]          Patient: Elisha Young   MR Number: [id number]   YOB: 1927   Date of Visit: 11/17/2020       Dear Sakshi Gilbert:    Thank you for referring Elisha Young to me for evaluation. Attached you will find relevant portions of my assessment and plan of care.    If you have questions, please do not hesitate to call me. I look forward to following Elisha Young along with you.    Sincerely,    Patricia De Dios PA-C    Enclosure  CC:  No Recipients    If you would like to receive this communication electronically, please contact [email or web address] or [phone] to request more information on [employer] Link access.    For providers and/or their staff who would like to refer a patient to [facility], please contact us through our one-stop-shop provider referral line, [facility], at [phone].    If you feel you have received this communication in error or would no longer like to receive these types of communications, please e-mail [email or web address]

## 2020-11-19 NOTE — PROGRESS NOTES
Outpatient Care Management  Plan of Care Follow Up Visit    Patient: Elisha Young  MRN: [id number]  Date of Service: 11/19/2020  Completed by: Bailee Seth RN  Referral Date: 11/06/2020  Program: Case Management (High Risk)    Reason for Visit   Patient presents with    OPCM Chart Review     11/13/20    OPCM RN First Assessment Attempt     11/13/20    Update Plan Of Care     11/19/20        Brief Summary: 11/19/20 - Pt states that since she has been consistent with taking the new iron regimen, she has not been having any complications with anemia. Assisted pt with finding new PCP at the new [facility] closer to her home.      Patient Summary     Involvement of Care:  Do I have permission to speak with other family members about your care?  No    Patient Reported Labs & Vitals:  1.  Any Patient Reported Labs & Vitals?  No  2.  Patient Reported Blood Pressure:     3.  Patient Reported Pulse:     4.  Patient Reported Weight (Kg):     5.  Patient Reported Blood Glucose (mg/dl):       Medical and social history was reviewed with patient and/or caregiver.     Clinical Assessment     Reviewed and provided basic information on available community resources for mental health, transportation, wellness resources, and palliative care programs with patient and/or caregiver.     Complex Care Plan     Care plan was discussed and completed today with input from patient and/or caregiver.    Patient Instructions     Instructions were provided via the [employer] patient resources and are available for the patient to view on the patient portal.    Next Steps: F/U concerning adding iron foods to diet  F/U concerning making appt with new PCP  Discuss issues with debility     Follow up in about 20 days (around 12/3/2020).    [facility] OPCM Self-Management Care Plan was developed with the patients/caregivers input and was based on identified barriers from todays assessment.  Goals were written today with the patient/caregiver and the  patient has agreed to work towards these goals to improve his/her overall well-being. Patient verbalized understanding of the care plan, goals, and all of today's instructions. Encouraged patient/caregiver to communicate with his/her physician and health care team about health conditions and the treatment plan.  Provided my contact information today and encouraged patient/caregiver to call me with any questions as needed.

## 2020-11-20 ENCOUNTER — OUTPATIENT CASE MANAGEMENT (OUTPATIENT)
Dept: ADMINISTRATIVE | Facility: OTHER | Age: 85
End: 2020-11-20

## 2020-11-20 NOTE — LETTER
November 23, 2020    Elisha Young  [address]  [facility] [address]             [facility]  [address]  [facility] [address] Dear Ms. Young    Please see below if you need information about your account balance with [facility] and/or would like to apply for financial assistance.    [facility] provides financial assistance for:   Emergency and medically necessary care,   To patients who are residents of [place] and [place],   And who are unable to pay.    Financial assistance applies to your portion of the bill only, for example, your deductible or co-payment.    Eligibility  Financial assistance is based on your family income and the current Federal Poverty Level.  Financial Assistance is offered as follows:   If your family income is 200% or less of the Federal Poverty Level, you may receive a 100% discount.    To Apply  To apply, call us [phone]    Please contact me at [phone] if you have any questions.    Sincerely,     Santy Beyer LCSW  Outpatient

## 2020-11-20 NOTE — LETTER
November 23, 2020    Elisha Young  [address]  [place] [address]             [facility]  [address]  [facility] [address] Dear Ms. Hector    Enclosed is an advance care planning packet, which includes a Healthcare Power of  form and Living Will for Life Sustaining Medical Procedures.  We encourage all patients, regardless of age or medical condition, to complete these forms as part of regular medical care to help plan for unexpected events.  These forms can by updated at any time.     Please review these forms with your family members so that they will be aware of your personal healthcare wishes.  Make copies of the forms for yourself and anyone involved in your healthcare.    Completed forms can be returned at your next medical appointment or emailed to [email or web address] to be included in your medical record.      Please contact me at [phone] if you have any questions.    Sincerely,      Santy Beyer LCSW  Outpatient

## 2020-11-23 NOTE — PROGRESS NOTES
Outpatient Care Management   - High Risk Patient Assessment    Patient: Elisha Young  MRN:  [id number]  Date of Service:  11/23/2020  Completed by:  Santy Beyer LCSW  Referral Date: 11/06/2020  Program: Case Management (High Risk)    Reason for Visit   Patient presents with    OPCM Chart Review     11.20.20    [facility]     11.[facility] - Left voicemail    Social Work Assessment - High Risk     11.23.20    Plan Of Care     11.23.20       Brief Summary:  received a referral from OPCM RN, Bailee Seth, for the following patient identified psychosocial needs: advance care planning, transportation, and [facility] medical bills. Assessment completed with patient's son, Bull, who is documented to be involved in patient's care in other encounters. Patient lives alone in a duplex with Bull living in the attached unit.  He assists patient in the evenings after work and he has a paid caregiver for 4 hours/day M-F.  Patient is being seen by care at home and HH with PT. Bull's brother and sister are not involved in her care and he is the only family member providing support.  Bull is paying out of pocket for a sitter, adult diapers, and Boost.  He reports that patient is incontinent and goes through several pads and diapers a day. Discussed importance of completing HPOA with patient having two other children not involved in her care - Bull agrees and will have forms witnessed by sitter and a neighbor.  He reports that he is the process of choosing a PCP at the [facility] location to get patient primary care closer to home. He has had to take off of work to [place] [facility] and that the [place] location is too far.  Explained transportation through [employer] par[employer] and patient's need for someone to go with her due to debility.  Bull reports that the sitter would be able to go with her if needed. Patient has a Bad Debt Balance listed but no current charges, will  provide Bull with number to PACS for more info if needed. Bull reports that patient has not had her cholesterol checked and is unsure if she has all prescribed meds.  [facility] NP is not scheduled again until 12/16.  Sent message to OPCM-RN requesting f/u.  Care plan was created in collaboration with patient/caregiver input.    Called [employer] and left voicemail re: adult diapers and Boost/Ensure.  Mailed ACP packet, [employer] application, and PACS financial assistance number.    By next encounter, caregiver agrees to: review mailed resources. Next steps: f/u with [employer].    Patient Summary     OPCM Social Work Assessment (High Risk)    Involvement of Care  Do I have permission to speak with other family members about your care?: Yes (Comment: Son, Bull, involved in care per encounters)  Assessment completed by: Children (Comment: Bull)  Cognitive  Which of the following can you state?:  (Comment: Patient fully oriented per Bull)  Cognitive barriers?: No  General  Marital status:   Current employment status: Retired and not working, Disabled  Support  Level of Caregiver support: Caregiver currently provides assistance  Support system: Children, Paid help  Is the caregiver reporting burnout?: No  Support Barriers?: No  Advanced Care Planning  Do you have any of the following?: Caregiver make informed decisions on your behalf  If yes, do we have a copy?: N/A  If no, would you like Advance Directive resources?: Yes  Advance Care Planning resources provided?: Yes  Is Advance Care Planning an area of need?: Yes  Financial  Current medical coverage: Medicare  Have you applied for government assistance programs?: No  Are you unable to pay any of the following?: Other (see comment) (Comment: Boost, adult diapers)  Gross monthly income: 1500  Financial Support Barriers?: Yes  Safety   History  Do you or your spouse currently or formerly serve in the ?: No  Disaster Plan  Established evacuation  plan?: Yes  [place] residence: [place]  Evacuation location: TBD   Mental Health Status  Emotional status: Telephonic/Unable to assess  Would you like mental health resources?: No  Mental Health Barriers?: No  Addictive Behaviors  Current alcohol consumption?: No  Current substance abuse?: No  Gambling habits?: No  Was the PHQ depression screening completed?: No  Was the MILDRED-7 completed?: No         Complex Care Plan     Care plan was discussed and completed today with input from patient and/or caregiver.    Patient Instructions     Follow up in about 1 week (around 11/30/2020).    Todays OPCM Self-Management Care Plan was developed with the patients/caregivers input and was based on identified barriers from todays assessment.  Goals were written today with the patient/caregiver and the patient has agreed to work towards these goals to improve his/her overall well-being. Patient verbalized understanding of the care plan, goals, and all of today's instructions. Encouraged patient/caregiver to communicate with his/her physician and health care team about health conditions and the treatment plan.  Provided my contact information today and encouraged patient/caregiver to call me with any questions as needed.

## 2020-12-03 ENCOUNTER — OUTPATIENT CASE MANAGEMENT (OUTPATIENT)
Dept: ADMINISTRATIVE | Facility: OTHER | Age: 85
End: 2020-12-03

## 2020-12-15 ENCOUNTER — CARE AT HOME (OUTPATIENT)
Dept: HOME HEALTH SERVICES | Facility: CLINIC | Age: 85
End: 2020-12-15
Payer: MEDICARE

## 2020-12-15 ENCOUNTER — OUTPATIENT CASE MANAGEMENT (OUTPATIENT)
Dept: ADMINISTRATIVE | Facility: OTHER | Age: 85
End: 2020-12-15

## 2020-12-15 VITALS
TEMPERATURE: 98 F | HEART RATE: 74 BPM | RESPIRATION RATE: 18 BRPM | SYSTOLIC BLOOD PRESSURE: 115 MMHG | OXYGEN SATURATION: 98 % | DIASTOLIC BLOOD PRESSURE: 60 MMHG

## 2020-12-15 DIAGNOSIS — I51.89 DIASTOLIC DYSFUNCTION: ICD-10-CM

## 2020-12-15 DIAGNOSIS — E87.1 HYPONATREMIA: ICD-10-CM

## 2020-12-15 DIAGNOSIS — E78.5 DYSLIPIDEMIA: ICD-10-CM

## 2020-12-15 DIAGNOSIS — R53.81 DEBILITY: Primary | ICD-10-CM

## 2020-12-15 DIAGNOSIS — D64.9 ANEMIA, UNSPECIFIED TYPE: ICD-10-CM

## 2020-12-15 DIAGNOSIS — R31.0 GROSS HEMATURIA: ICD-10-CM

## 2020-12-15 PROCEDURE — 99350 PR HOME VISIT,ESTAB PATIENT,LEVEL IV: ICD-10-PCS | Mod: S$GLB,,, | Performed by: NURSE PRACTITIONER

## 2020-12-15 PROCEDURE — 99350 HOME/RES VST EST HIGH MDM 60: CPT | Mod: S$GLB,,, | Performed by: NURSE PRACTITIONER

## 2020-12-16 NOTE — ASSESSMENT & PLAN NOTE
Reviewed recent labs collected by home health  Na at 133 stable  Keep scheduled follow up with PCP

## 2020-12-16 NOTE — PATIENT INSTRUCTIONS
Instructions:  - [facility] to schedule home follow-up visit with patient in 4-6 weeks or as needed.  - Continue all medications, treatments and therapies as ordered.   - Follow all instructions, recommendations as discussed.  - Maintain Safety Precautions at all times.  - Attend all medical appointments as scheduled.  - For worsening symptoms: call Primary Care Physician or Nurse Practitioner.  - For emergencies, call 911 or immediately report to the nearest emergency room.  - Limit Risks of environmental exposure to coronavirus/COVID-19 as discussed including: social distancing, hand hygiene, and limiting departures from the home for necessities only.

## 2020-12-16 NOTE — PROGRESS NOTES
[facility]  Medical Home Visit    Visit Date: 12/15/2020  Encounter Provider: Servando Gilbert NP  PCP:  Marky García MD    PRESENTING HISTORY      Patient ID: Elisha Young 89 y.o. female.    Consult Requested By:  No ref. provider found  Reason for Consult: Medical follow up  Chief Complaint: Medical follow up    HPI: Elisha Young is a 89 y.o. female with history of HTN, Hematuria, HLD, CHF. Patient being seen for medical follow up.     Today:  Ms. Elisha Young is a 89 y.o. female is being seen and examined at home today for medical follow up visit.  Elisha presents at baseline state of health as reported by patient and caregiver. VSS. Denies any acute issues, concerns or complaints to address on today's visit. Denies hematuria.  Reports she has been taking medication as prescribed.  Patient reports that home health has been coming to home also.  Patient currently requiring minimal assistance with walker. She reports she has not scheduled follow up with PCP.  Scheduled [facility] follow up appointment at [facility].  Patient has appointment with Dr. Rosa 1/28/20 at 10:30am. No other needs identified at this time. Risks of environmental exposure to coronavirus discussed including: social distancing, hand hygiene, and limiting departures from the home for necessities only.  Reports understanding and willingness to comply.     Attestation: Screening criteria to assess the level of the patient's risk for infection with COVID-19 as recommended by the CDC at the time of the above documented home visit concluded appropriateness to proceed. Universal precautions were maintained at all times, including provider use of >60% alcohol gel hand  immediately prior to entry and upon departing the patient's home as well as cleaning of equipment used in home visit with antibacterial/germicidal disposable wipes.        _________________________________________________________________    Review of  Systems   Constitutional: Negative for chills and fever.   HENT: Negative for congestion, postnasal drip and rhinorrhea.    Eyes: Negative for visual disturbance.   Respiratory: Negative for chest tightness and shortness of breath.    Cardiovascular: Negative for chest pain and palpitations.   Gastrointestinal: Negative for abdominal pain, constipation, diarrhea, nausea and vomiting.   Genitourinary: Negative for difficulty urinating, dysuria and hematuria.   Musculoskeletal: Negative for arthralgias and myalgias.   Skin: Negative for color change and wound.   Neurological: Negative for dizziness, weakness, light-headedness and headaches.   Hematological: Does not bruise/bleed easily.   Psychiatric/Behavioral: Negative for agitation.     Assessments:  · Environmental: single story home, 5 steps to enter, adequate lighting and temeprature control  · Functional Status: Requires assitance with ADL's/IADL's, ambulates with assistance of a cane/walker, incontinent of bowel and bladder  · Safety: Fall Precautions, COVID Precautions/Social Distancing/Mask Use  · Nutritional: Adequate  · [facility]  · DME/Supplies: Rolling Walker, BSC, shower chair      PAST HISTORY:     Past Medical History:   Diagnosis Date    CHF (congestive heart failure)     Dyslipidemia     Hyperlipidemia     Hypertension        Past Surgical History:   Procedure Laterality Date    BIOPSY OF BLADDER N/A 10/9/2020    Procedure: BIOPSY, BLADDER;  Surgeon: Leonora Hercules MD;  Location: [address];  Service: Urology;  Laterality: N/A;    BLADDER FULGURATION N/A 10/9/2020    Procedure: FULGURATION, BLADDER;  Surgeon: Leonora Hercules MD;  Location: [facility] OR [address] [place] [address];  Service: Urology;  Laterality: N/A;    CYSTOSCOPY N/A 10/9/2020    Procedure: CYSTOSCOPY;  Surgeon: Leonora Hercules MD;  Location: [facility] OR [address] [place] [address];  Service: Urology;  Laterality: N/A;    HYSTERECTOMY      REMOVAL OF BLOOD CLOT N/A 10/9/2020     Procedure: REMOVAL, BLOOD CLOT;  Surgeon: Leonora Hercules MD;  Location: [facility] OR [address] [place] [address];  Service: Urology;  Laterality: N/A;    RETROGRADE PYELOGRAPHY Bilateral 10/9/2020    Procedure: PYELOGRAM, RETROGRADE;  Surgeon: Leonora Hercules MD;  Location: [facility] OR [address] [place] [address];  Service: Urology;  Laterality: Bilateral;    tonsils         Family History   Problem Relation Age of Onset    Heart disease Father        Social History     Socioeconomic History    Marital status:      Spouse name: Not on file    Number of children: 3    Years of education: Not on file    Highest education level: Not on file   Occupational History    Not on file   Social Needs    Financial resource strain: Not hard at all    Food insecurity     Worry: Never true     Inability: Never true    Transportation needs     Medical: Yes     Non-medical: Yes   Tobacco Use    Smoking status: Never Smoker    Smokeless tobacco: Never Used   Substance and Sexual Activity    Alcohol use: No    Drug use: Never    Sexual activity: Not Currently   Lifestyle    Physical activity     Days per week: 0 days     Minutes per session: 0 min    Stress: Only a little   Relationships    Social connections     Talks on phone: More than three times a week     Gets together: More than three times a week     Attends L[employer] day service: 1 to 4 times per year     Active member of club or organization: No     Attends meetings of clubs or organizations: Never     Relationship status:    Other Topics Concern    Not on file   Social History Narrative    Not on file       MEDICATIONS & ALLERGIES:     Current Outpatient Medications on File Prior to Visit   Medication Sig Dispense Refill    acetaminophen (TYLENOL) 325 MG tablet Take 2 tablets (650 mg total) by mouth every 6 (six) hours as needed. (Patient not taking: Reported on 11/17/2020) 120 tablet 0    ANTIOX #11/OM3/DHA/EPA/LUT/SATYA (OCUVITE ADULT 50+ ORAL) Take 1 tablet by mouth once daily.        ferrous sulfate 325 (65 FE) MG EC tablet Take 1 tablet (325 mg total) by mouth once daily. 30 tablet 3    gemfibroziL (LOPID) 600 MG tablet Take 600 mg by mouth 2 (two) times daily before meals.      POTASSIUM ORAL Take 1 tablet by mouth once daily. OTC      senna-docusate 8.6-50 mg (PERICOLACE) 8.6-50 mg per tablet Take 2 tablets by mouth once daily. 30 tablet 0    simvastatin (ZOCOR) 20 MG tablet Take 20 mg by mouth every evening.        No current facility-administered medications on file prior to visit.         Review of patient's allergies indicates:  No Known Allergies    OBJECTIVE:     Vital Signs:  Vitals:    12/15/20 1500   BP: 115/60   Pulse: 74   Resp: 18   Temp: 98.2 °F (36.8 °C)     There is no height or weight on file to calculate BMI.       Physical Exam  Constitutional:       Appearance: Normal appearance.   HENT:      Head: Normocephalic and atraumatic.      Nose: No congestion or rhinorrhea.      Mouth/Throat:      Mouth: Mucous membranes are moist.   Eyes:      Extraocular Movements: Extraocular movements intact.   Neck:      Musculoskeletal: Normal range of motion and neck supple.   Cardiovascular:      Rate and Rhythm: Normal rate.   Pulmonary:      Breath sounds: Normal breath sounds.   Abdominal:      General: Bowel sounds are normal.   Musculoskeletal:         General: No swelling or tenderness.   Skin:     General: Skin is warm and dry.   Neurological:      General: No focal deficit present.      Mental Status: She is alert and oriented to person, place, and time.   Psychiatric:         Mood and Affect: Mood normal.       Laboratory  Lab Results   Component Value Date    WBC 5.94 10/10/2020    HGB 9.5 (L) 10/10/2020    HCT 28.9 (L) 10/10/2020    MCV 99 (H) 10/10/2020     10/10/2020     Lab Results   Component Value Date    INR 0.9 10/07/2020    INR 1.0 12/26/2019     No results found for: HGBA1C  No results for input(s): POCTGLUCOSE in the last 72 hours.      Family and/or  Caretaker present at visit?  No.  Diagnostic tests reviewed/disposition: No diagnosic tests pending after this hospitalization.  Disease/illness education: Importance of Compliance with all prescribed medications and treatments, COVID Precautions/Social Distancing/Mask Use  Home health/community services discussion/referrals: Patient does have home health established with [facility].    Establishment or re-establishment of referral orders for community resources: No other necessary community resources.   Discussion with other health care providers: No discussion with other health care providers necessary.     Medications Reconciliation:   I have reconciled the patient's home medications and discharge medications with the patient/family. I have updated all changes. Refer to After-Visit Medication List.    Discharge plans, follow-up instructions, future appointments, provider contact information, indicators to seek emergency treatment and encouragement to call for any questions, concerns or clarification of the patient's plan of care explained to patient and/or caregiver(s), whom confirm understanding of provided information and endorse willingness to comply.     ASSESSMENT & PLAN:     HIGH RISK CONDITION(S):  Patient has a condition that poses threat to life and bodily function: Congestive Heart Failure, UTI with Hematuria    Elisha was seen today for transitional care.    Diagnoses and all orders for this visit:        Problem List Items Addressed This Visit        Cardiac/Vascular    Diastolic dysfunction     Denies chest pain or SOB  Appears euvolemic on exam          Dyslipidemia     Continue statin  Keep scheduled follow up with PCP             Renal/    Hyponatremia     Reviewed recent labs collected by home health  Na at 133 stable  Keep scheduled follow up with PCP          RESOLVED: Gross hematuria     Denies hematuria  Continue to monitor             Oncology    Anemia     Denies visual signs of  bleeding  Reviewed recent  labs H&H stable 10.4/31.6  Continue daily iron             Other    Debility - Primary     Patient ambulates with walker with minimal assistance  Continue home health               Encounter for Support and Coordination of Transition of Care   - [facility] Nurse Practitioner to schedule home visit with patient  in 6 weeks  Were controlled substances prescribed?  No     Instructions:  - [facility] Nurse Practitioner to schedule home follow-up visit with patient in 6 weeks or as needed.  - Continue all medications, treatments and therapies as ordered.   - Follow all instructions, recommendations as discussed.  - Maintain Safety Precautions at all times.  - Attend all medical appointments as scheduled.  - For worsening symptoms: call Primary Care Physician or Nurse Practitioner.  - For emergencies, call 911 or immediately report to the nearest emergency room.  - Limit Risks of environmental exposure to coronavirus/COVID-19 as discussed including: social distancing, hand hygiene, and limiting departures from the home for necessities only.      Scheduled Follow-up :  Future Appointments   Date Time Provider Department Center   1/28/2021 10:30 AM Olivia Butcher MD [facility]       After Visit Medication List :     Medication List          Accurate as of December 15, 2020 10:58 PM. If you have any questions, ask your nurse or doctor.            CONTINUE taking these medications    acetaminophen 325 MG tablet  Commonly known as: TYLENOL  Take 2 tablets (650 mg total) by mouth every 6 (six) hours as needed.     ferrous sulfate 325 (65 FE) MG EC tablet  Take 1 tablet (325 mg total) by mouth once daily.     gemfibroziL 600 MG tablet  Commonly known as: LOPID     OCUVITE ADULT 50+ ORAL     POTASSIUM ORAL     senna-docusate 8.6-50 mg 8.6-50 mg per tablet  Commonly known as: PERICOLACE  Take 2 tablets by mouth once daily.     simvastatin 20 MG tablet  Commonly known as:  ZOCOR            Patient consent was obtained prior to treatment on this visit.    Attestation: Screening criteria to assess the level of the patient's risk for infection with COVID-19 as recommended by the CDC at the time of the above documented home visit concluded appropriateness to proceed. Universal precautions were maintained at all times, including provider use of >60% alcohol gel hand  immediately prior to entry and upon departing the patient's home as well as cleaning of equipment used in home visit with antibacterial/germicidal disposable wipes.     Signature:       MELISA Singh   [facility]    Total Face-to-Face Encounter: 60 minutes with >50% of time spent discussing the care with the patient/family.

## 2020-12-21 NOTE — PROGRESS NOTES
Outpatient Care Management  Plan of Care Follow Up Visit    Patient: Elisha Young  MRN: [id number]  Date of Service: 12/21/2020  Completed by: Bailee Seth RN  Referral Date: 11/06/2020  Program: Case Management (High Risk)    Reason for Visit   Patient presents with    OPCM Chart Review     12/3/20    OPCM RN First Follow-Up Attempt     12/3/20     OPCM RN Second Follow-Up Attempt     12/18/20    Update Plan Of Care     12/21/20       Brief Summary: 12/21/20 - Pt states she has setup appt with new PCP for 1/2021. Pt states she has been compliant with taking iron meds. Pt states she has also been drinking Boost which has caused her to gain some weight; pt states she is now 100 lbs. Pt states the Boost also helps with constipation due to the iron meds.     Patient Summary     Involvement of Care:  Do I have permission to speak with other family members about your care?  Yes    Patient Reported Labs & Vitals:  1.  Any Patient Reported Labs & Vitals?  No  2.  Patient Reported Blood Pressure:     3.  Patient Reported Pulse:     4.  Patient Reported Weight (Kg):     5.  Patient Reported Blood Glucose (mg/dl):       Medical and social history was reviewed with patient and/or caregiver.     Clinical Assessment     Reviewed and provided basic information on available community resources for mental health, transportation, wellness resources, and palliative care programs with patient and/or caregiver.     Complex Care Plan     Care plan was discussed and completed today with input from patient and/or caregiver.    Patient Instructions     Instructions were provided via the [facility] patient resources and are available for the patient to view on the patient portal.    Next Steps: F/U concerning self care with anemia    Follow up in about 27 days (around 12/30/2020).    Todays OPCM Self-Management Care Plan was developed with the patients/caregivers input and was based on identified barriers from todays assessment.   Goals were written today with the patient/caregiver and the patient has agreed to work towards these goals to improve his/her overall well-being. Patient verbalized understanding of the care plan, goals, and all of today's instructions. Encouraged patient/caregiver to communicate with his/her physician and health care team about health conditions and the treatment plan.  Provided my contact information today and encouraged patient/caregiver to call me with any questions as needed.

## 2020-12-24 ENCOUNTER — EXTERNAL HOME HEALTH (OUTPATIENT)
Dept: HOME HEALTH SERVICES | Facility: HOSPITAL | Age: 85
End: 2020-12-24

## 2020-12-30 ENCOUNTER — OUTPATIENT CASE MANAGEMENT (OUTPATIENT)
Dept: ADMINISTRATIVE | Facility: OTHER | Age: 85
End: 2020-12-30

## 2021-01-01 ENCOUNTER — LAB VISIT (OUTPATIENT)
Dept: LAB | Facility: HOSPITAL | Age: 86
End: 2021-01-01
Attending: FAMILY MEDICINE
Payer: MEDICARE

## 2021-01-01 ENCOUNTER — TELEPHONE (OUTPATIENT)
Dept: HOME HEALTH SERVICES | Facility: CLINIC | Age: 86
End: 2021-01-01

## 2021-01-01 ENCOUNTER — TELEPHONE (OUTPATIENT)
Dept: PRIMARY CARE CLINIC | Facility: CLINIC | Age: 86
End: 2021-01-01

## 2021-01-01 ENCOUNTER — OFFICE VISIT (OUTPATIENT)
Dept: PRIMARY CARE CLINIC | Facility: CLINIC | Age: 86
End: 2021-01-01
Payer: MEDICARE

## 2021-01-01 ENCOUNTER — EXTERNAL HOME HEALTH (OUTPATIENT)
Dept: HOME HEALTH SERVICES | Facility: HOSPITAL | Age: 86
End: 2021-01-01

## 2021-01-01 ENCOUNTER — OUTPATIENT CASE MANAGEMENT (OUTPATIENT)
Dept: ADMINISTRATIVE | Facility: OTHER | Age: 86
End: 2021-01-01

## 2021-01-01 ENCOUNTER — IMMUNIZATION (OUTPATIENT)
Dept: PRIMARY CARE CLINIC | Facility: CLINIC | Age: 86
End: 2021-01-01
Payer: MEDICARE

## 2021-01-01 VITALS
DIASTOLIC BLOOD PRESSURE: 76 MMHG | HEART RATE: 108 BPM | HEIGHT: 62 IN | SYSTOLIC BLOOD PRESSURE: 150 MMHG | OXYGEN SATURATION: 98 % | TEMPERATURE: 98 F | WEIGHT: 95.69 LBS | BODY MASS INDEX: 17.61 KG/M2

## 2021-01-01 VITALS
OXYGEN SATURATION: 90 % | SYSTOLIC BLOOD PRESSURE: 118 MMHG | BODY MASS INDEX: 18.09 KG/M2 | HEART RATE: 106 BPM | HEIGHT: 62 IN | DIASTOLIC BLOOD PRESSURE: 62 MMHG | WEIGHT: 98.31 LBS

## 2021-01-01 VITALS
OXYGEN SATURATION: 98 % | DIASTOLIC BLOOD PRESSURE: 64 MMHG | HEIGHT: 62 IN | HEART RATE: 97 BPM | SYSTOLIC BLOOD PRESSURE: 126 MMHG | BODY MASS INDEX: 17.48 KG/M2 | WEIGHT: 95 LBS | TEMPERATURE: 98 F

## 2021-01-01 DIAGNOSIS — M81.0 AGE-RELATED OSTEOPOROSIS WITHOUT CURRENT PATHOLOGICAL FRACTURE: ICD-10-CM

## 2021-01-01 DIAGNOSIS — I50.32 CHRONIC DIASTOLIC HEART FAILURE: ICD-10-CM

## 2021-01-01 DIAGNOSIS — R53.81 DEBILITY: ICD-10-CM

## 2021-01-01 DIAGNOSIS — E87.6 HYPOKALEMIA: ICD-10-CM

## 2021-01-01 DIAGNOSIS — Z76.89 ENCOUNTER TO ESTABLISH CARE: Primary | ICD-10-CM

## 2021-01-01 DIAGNOSIS — Z76.89 ESTABLISHING CARE WITH NEW DOCTOR, ENCOUNTER FOR: ICD-10-CM

## 2021-01-01 DIAGNOSIS — Z23 NEED FOR VACCINATION: Primary | ICD-10-CM

## 2021-01-01 DIAGNOSIS — Z91.81 AT RISK FOR FALLS: ICD-10-CM

## 2021-01-01 DIAGNOSIS — Z28.83 COVID-19 VIRUS VACCINE NOT AVAILABLE: ICD-10-CM

## 2021-01-01 DIAGNOSIS — I70.0 AORTIC ATHEROSCLEROSIS: ICD-10-CM

## 2021-01-01 DIAGNOSIS — I51.89 DIASTOLIC DYSFUNCTION: ICD-10-CM

## 2021-01-01 DIAGNOSIS — I10 ESSENTIAL HYPERTENSION: Primary | ICD-10-CM

## 2021-01-01 DIAGNOSIS — D50.8 IRON DEFICIENCY ANEMIA SECONDARY TO INADEQUATE DIETARY IRON INTAKE: ICD-10-CM

## 2021-01-01 DIAGNOSIS — E78.2 MIXED HYPERLIPIDEMIA: ICD-10-CM

## 2021-01-01 DIAGNOSIS — Z87.440 HISTORY OF UTI: ICD-10-CM

## 2021-01-01 DIAGNOSIS — I10 ESSENTIAL HYPERTENSION: ICD-10-CM

## 2021-01-01 LAB
ANION GAP SERPL CALC-SCNC: 11 MMOL/L (ref 8–16)
BUN SERPL-MCNC: 9 MG/DL (ref 10–30)
CALCIUM SERPL-MCNC: 9.5 MG/DL (ref 8.7–10.5)
CHLORIDE SERPL-SCNC: 101 MMOL/L (ref 95–110)
CO2 SERPL-SCNC: 22 MMOL/L (ref 23–29)
CREAT SERPL-MCNC: 0.6 MG/DL (ref 0.5–1.4)
EST. GFR  (AFRICAN AMERICAN): >60 ML/MIN/1.73 M^2
EST. GFR  (NON AFRICAN AMERICAN): >60 ML/MIN/1.73 M^2
GLUCOSE SERPL-MCNC: 89 MG/DL (ref 70–110)
MAGNESIUM SERPL-MCNC: 1.9 MG/DL (ref 1.6–2.6)
POTASSIUM SERPL-SCNC: 3.5 MMOL/L (ref 3.5–5.1)
SODIUM SERPL-SCNC: 134 MMOL/L (ref 136–145)

## 2021-01-01 PROCEDURE — 99999 PR PBB SHADOW E&M-EST. PATIENT-LVL IV: CPT | Mod: PBBFAC,,, | Performed by: FAMILY MEDICINE

## 2021-01-01 PROCEDURE — 99213 OFFICE O/P EST LOW 20 MIN: CPT | Mod: PBBFAC,PN | Performed by: FAMILY MEDICINE

## 2021-01-01 PROCEDURE — 36415 COLL VENOUS BLD VENIPUNCTURE: CPT | Mod: PN | Performed by: FAMILY MEDICINE

## 2021-01-01 PROCEDURE — 99204 PR OFFICE/OUTPT VISIT, NEW, LEVL IV, 45-59 MIN: ICD-10-PCS | Mod: S$PBB,,, | Performed by: FAMILY MEDICINE

## 2021-01-01 PROCEDURE — 91300 PR SARS-COV- 2 COVID-19 VACCINE, NO PRSV, 30MCG/0.3ML, IM: CPT | Mod: PBBFAC | Performed by: INTERNAL MEDICINE

## 2021-01-01 PROCEDURE — 99213 PR OFFICE/OUTPT VISIT, EST, LEVL III, 20-29 MIN: ICD-10-PCS | Mod: S$PBB,,, | Performed by: FAMILY MEDICINE

## 2021-01-01 PROCEDURE — 91300 PR SARS-COV- 2 COVID-19 VACCINE, NO PRSV, 30MCG/0.3ML, IM: CPT | Mod: S$GLB,,, | Performed by: INTERNAL MEDICINE

## 2021-01-01 PROCEDURE — 99999 PR PBB SHADOW E&M-EST. PATIENT-LVL III: ICD-10-PCS | Mod: PBBFAC,,, | Performed by: FAMILY MEDICINE

## 2021-01-01 PROCEDURE — 99214 OFFICE O/P EST MOD 30 MIN: CPT | Mod: PBBFAC,PN | Performed by: FAMILY MEDICINE

## 2021-01-01 PROCEDURE — 99999 PR PBB SHADOW E&M-EST. PATIENT-LVL III: CPT | Mod: PBBFAC,,, | Performed by: FAMILY MEDICINE

## 2021-01-01 PROCEDURE — 99999 PR PBB SHADOW E&M-EST. PATIENT-LVL IV: ICD-10-PCS | Mod: PBBFAC,,, | Performed by: FAMILY MEDICINE

## 2021-01-01 PROCEDURE — 0002A PR IMMUNIZ ADMIN, SARS-COV-2 COVID-19 VACC, 30MCG/0.3ML, 2ND DOSE: CPT | Mod: CV19,S$GLB,, | Performed by: INTERNAL MEDICINE

## 2021-01-01 PROCEDURE — 0001A PR IMMUNIZ ADMIN, SARS-COV-2 COVID-19 VACC, 30MCG/0.3ML, 1ST DOSE: CPT | Mod: PBBFAC | Performed by: INTERNAL MEDICINE

## 2021-01-01 PROCEDURE — 83735 ASSAY OF MAGNESIUM: CPT | Performed by: FAMILY MEDICINE

## 2021-01-01 PROCEDURE — 0002A PR IMMUNIZ ADMIN, SARS-COV-2 COVID-19 VACC, 30MCG/0.3ML, 2ND DOSE: ICD-10-PCS | Mod: CV19,S$GLB,, | Performed by: INTERNAL MEDICINE

## 2021-01-01 PROCEDURE — 99214 PR OFFICE/OUTPT VISIT, EST, LEVL IV, 30-39 MIN: ICD-10-PCS | Mod: S$PBB,,, | Performed by: FAMILY MEDICINE

## 2021-01-01 PROCEDURE — 91300 PR SARS-COV- 2 COVID-19 VACCINE, NO PRSV, 30MCG/0.3ML, IM: ICD-10-PCS | Mod: S$GLB,,, | Performed by: INTERNAL MEDICINE

## 2021-01-01 PROCEDURE — 99214 OFFICE O/P EST MOD 30 MIN: CPT | Mod: S$PBB,,, | Performed by: FAMILY MEDICINE

## 2021-01-01 PROCEDURE — 99204 OFFICE O/P NEW MOD 45 MIN: CPT | Mod: S$PBB,,, | Performed by: FAMILY MEDICINE

## 2021-01-01 PROCEDURE — 80048 BASIC METABOLIC PNL TOTAL CA: CPT | Performed by: FAMILY MEDICINE

## 2021-01-01 PROCEDURE — 99213 OFFICE O/P EST LOW 20 MIN: CPT | Mod: S$PBB,,, | Performed by: FAMILY MEDICINE

## 2021-01-01 RX ORDER — LISINOPRIL 10 MG/1
10 TABLET ORAL DAILY
Qty: 90 TABLET | Refills: 3 | OUTPATIENT
Start: 2021-01-01 | End: 2022-01-01

## 2021-01-01 RX ORDER — TOLTERODINE 4 MG/1
4 CAPSULE, EXTENDED RELEASE ORAL DAILY
Qty: 30 CAPSULE | Refills: 5 | OUTPATIENT
Start: 2021-01-01 | End: 2022-01-01

## 2021-01-01 RX ORDER — INFLUENZA A VIRUS A/MICHIGAN/45/2015 X-275 (H1N1) ANTIGEN (FORMALDEHYDE INACTIVATED), INFLUENZA A VIRUS A/SINGAPORE/INFIMH-16-0019/2016 IVR-186 (H3N2) ANTIGEN (FORMALDEHYDE INACTIVATED), INFLUENZA B VIRUS B/PHUKET/3073/2013 ANTIGEN (FORMALDEHYDE INACTIVATED), AND INFLUENZA B VIRUS B/MARYLAND/15/2016 BX-69A ANTIGEN (FORMALDEHYDE INACTIVATED) 60; 60; 60; 60 UG/.7ML; UG/.7ML; UG/.7ML; UG/.7ML
INJECTION, SUSPENSION INTRAMUSCULAR
COMMUNITY
Start: 2020-11-05 | End: 2021-01-01

## 2021-01-01 RX ORDER — FERROUS SULFATE 325(65) MG
325 TABLET, DELAYED RELEASE (ENTERIC COATED) ORAL
COMMUNITY
End: 2022-01-01 | Stop reason: HOSPADM

## 2021-01-01 RX ORDER — LISINOPRIL 10 MG/1
10 TABLET ORAL DAILY
Qty: 30 TABLET | Refills: 5 | Status: SHIPPED | OUTPATIENT
Start: 2021-01-01 | End: 2021-01-01 | Stop reason: SDUPTHER

## 2021-01-01 RX ADMIN — RNA INGREDIENT BNT-162B2 0.3 ML: 0.23 INJECTION, SUSPENSION INTRAMUSCULAR at 03:02

## 2021-01-01 RX ADMIN — Medication 0.3 ML: at 02:03

## 2021-01-07 ENCOUNTER — HOSPITAL ENCOUNTER (EMERGENCY)
Facility: HOSPITAL | Age: 86
Discharge: HOME | End: 2021-01-08
Attending: EMERGENCY MEDICINE
Payer: MEDICARE

## 2021-01-07 VITALS
RESPIRATION RATE: 18 BRPM | BODY MASS INDEX: 16.56 KG/M2 | WEIGHT: 90 LBS | HEIGHT: 62 IN | TEMPERATURE: 98 F | OXYGEN SATURATION: 95 % | SYSTOLIC BLOOD PRESSURE: 167 MMHG | DIASTOLIC BLOOD PRESSURE: 74 MMHG | HEART RATE: 88 BPM

## 2021-01-07 DIAGNOSIS — E87.6 HYPOKALEMIA: ICD-10-CM

## 2021-01-07 DIAGNOSIS — N30.91 HEMORRHAGIC CYSTITIS: Primary | ICD-10-CM

## 2021-01-07 LAB
ALBUMIN SERPL BCP-MCNC: 3.3 G/DL (ref 3.5–5.2)
ALP SERPL-CCNC: 124 U/L (ref 55–135)
ALT SERPL W/O P-5'-P-CCNC: 10 U/L (ref 10–44)
ANION GAP SERPL CALC-SCNC: 7 MMOL/L (ref 8–16)
AST SERPL-CCNC: 21 U/L (ref 10–40)
BACTERIA #/AREA URNS AUTO: ABNORMAL /HPF
BASOPHILS # BLD AUTO: 0.04 K/UL (ref 0–0.2)
BASOPHILS NFR BLD: 0.6 % (ref 0–1.9)
BILIRUB SERPL-MCNC: 0.2 MG/DL (ref 0.1–1)
BILIRUB UR QL STRIP: NEGATIVE
BUN SERPL-MCNC: 12 MG/DL (ref 10–30)
BUN SERPL-MCNC: 12 MG/DL (ref 6–30)
CALCIUM SERPL-MCNC: 9 MG/DL (ref 8.7–10.5)
CHLORIDE SERPL-SCNC: 105 MMOL/L (ref 95–110)
CHLORIDE SERPL-SCNC: 106 MMOL/L (ref 95–110)
CLARITY UR REFRACT.AUTO: ABNORMAL
CO2 SERPL-SCNC: 24 MMOL/L (ref 23–29)
COLOR UR AUTO: ABNORMAL
CREAT SERPL-MCNC: 0.4 MG/DL (ref 0.5–1.4)
CREAT SERPL-MCNC: 0.6 MG/DL (ref 0.5–1.4)
DIFFERENTIAL METHOD: ABNORMAL
EOSINOPHIL # BLD AUTO: 0.2 K/UL (ref 0–0.5)
EOSINOPHIL NFR BLD: 2.3 % (ref 0–8)
ERYTHROCYTE [DISTWIDTH] IN BLOOD BY AUTOMATED COUNT: 13.8 % (ref 11.5–14.5)
EST. GFR  (AFRICAN AMERICAN): >60 ML/MIN/1.73 M^2
EST. GFR  (NON AFRICAN AMERICAN): >60 ML/MIN/1.73 M^2
GLUCOSE SERPL-MCNC: 98 MG/DL (ref 70–110)
GLUCOSE SERPL-MCNC: 99 MG/DL (ref 70–110)
GLUCOSE UR QL STRIP: NEGATIVE
HCT VFR BLD AUTO: 34.5 % (ref 37–48.5)
HCT VFR BLD CALC: 32 %PCV (ref 36–54)
HGB BLD-MCNC: 11.1 G/DL (ref 12–16)
HGB UR QL STRIP: ABNORMAL
HYALINE CASTS UR QL AUTO: 0 /LPF
IMM GRANULOCYTES # BLD AUTO: 0.02 K/UL (ref 0–0.04)
IMM GRANULOCYTES NFR BLD AUTO: 0.3 % (ref 0–0.5)
KETONES UR QL STRIP: NEGATIVE
LEUKOCYTE ESTERASE UR QL STRIP: ABNORMAL
LYMPHOCYTES # BLD AUTO: 2.8 K/UL (ref 1–4.8)
LYMPHOCYTES NFR BLD: 39.6 % (ref 18–48)
MCH RBC QN AUTO: 32.3 PG (ref 27–31)
MCHC RBC AUTO-ENTMCNC: 32.2 G/DL (ref 32–36)
MCV RBC AUTO: 100 FL (ref 82–98)
MICROSCOPIC COMMENT: ABNORMAL
MONOCYTES # BLD AUTO: 0.6 K/UL (ref 0.3–1)
MONOCYTES NFR BLD: 8.4 % (ref 4–15)
NEUTROPHILS # BLD AUTO: 3.4 K/UL (ref 1.8–7.7)
NEUTROPHILS NFR BLD: 48.8 % (ref 38–73)
NITRITE UR QL STRIP: NEGATIVE
NRBC BLD-RTO: 0 /100 WBC
PH UR STRIP: 7 [PH] (ref 5–8)
PLATELET # BLD AUTO: 273 K/UL (ref 150–350)
PMV BLD AUTO: 9.9 FL (ref 9.2–12.9)
POC IONIZED CALCIUM: 1.2 MMOL/L (ref 1.06–1.42)
POC TCO2 (MEASURED): 25 MMOL/L (ref 23–29)
POTASSIUM BLD-SCNC: 3 MMOL/L (ref 3.5–5.1)
POTASSIUM SERPL-SCNC: 3.1 MMOL/L (ref 3.5–5.1)
PROT SERPL-MCNC: 6.6 G/DL (ref 6–8.4)
PROT UR QL STRIP: ABNORMAL
RBC # BLD AUTO: 3.44 M/UL (ref 4–5.4)
RBC #/AREA URNS AUTO: >100 /HPF (ref 0–4)
SAMPLE: ABNORMAL
SODIUM BLD-SCNC: 135 MMOL/L (ref 136–145)
SODIUM SERPL-SCNC: 137 MMOL/L (ref 136–145)
SP GR UR STRIP: 1.01 (ref 1–1.03)
SQUAMOUS #/AREA URNS AUTO: 1 /HPF
URN SPEC COLLECT METH UR: ABNORMAL
WBC # BLD AUTO: 6.99 K/UL (ref 3.9–12.7)
WBC #/AREA URNS AUTO: 92 /HPF (ref 0–5)
WBC CLUMPS UR QL AUTO: ABNORMAL

## 2021-01-07 PROCEDURE — 99285 EMERGENCY DEPT VISIT HI MDM: CPT | Mod: 25

## 2021-01-07 PROCEDURE — 87186 SC STD MICRODIL/AGAR DIL: CPT | Mod: 59

## 2021-01-07 PROCEDURE — 99285 PR EMERGENCY DEPT VISIT,LEVEL V: ICD-10-PCS | Mod: ,,, | Performed by: EMERGENCY MEDICINE

## 2021-01-07 PROCEDURE — 87086 URINE CULTURE/COLONY COUNT: CPT

## 2021-01-07 PROCEDURE — 25000003 PHARM REV CODE 250: Performed by: EMERGENCY MEDICINE

## 2021-01-07 PROCEDURE — 87088 URINE BACTERIA CULTURE: CPT

## 2021-01-07 PROCEDURE — 80053 COMPREHEN METABOLIC PANEL: CPT

## 2021-01-07 PROCEDURE — 99285 EMERGENCY DEPT VISIT HI MDM: CPT | Mod: ,,, | Performed by: EMERGENCY MEDICINE

## 2021-01-07 PROCEDURE — 81001 URINALYSIS AUTO W/SCOPE: CPT

## 2021-01-07 PROCEDURE — 87077 CULTURE AEROBIC IDENTIFY: CPT

## 2021-01-07 PROCEDURE — 96374 THER/PROPH/DIAG INJ IV PUSH: CPT

## 2021-01-07 PROCEDURE — 82330 ASSAY OF CALCIUM: CPT

## 2021-01-07 PROCEDURE — 85025 COMPLETE CBC W/AUTO DIFF WBC: CPT

## 2021-01-07 PROCEDURE — 80047 BASIC METABLC PNL IONIZED CA: CPT

## 2021-01-07 PROCEDURE — 63600175 PHARM REV CODE 636 W HCPCS: Performed by: EMERGENCY MEDICINE

## 2021-01-07 RX ORDER — POTASSIUM CHLORIDE 20 MEQ/1
20 TABLET, EXTENDED RELEASE ORAL DAILY
Qty: 7 TABLET | Refills: 0 | Status: SHIPPED | OUTPATIENT
Start: 2021-01-07 | End: 2021-01-07 | Stop reason: SDUPTHER

## 2021-01-07 RX ORDER — SULFAMETHOXAZOLE AND TRIMETHOPRIM 800; 160 MG/1; MG/1
1 TABLET ORAL 2 TIMES DAILY
Qty: 14 TABLET | Refills: 0 | Status: SHIPPED | OUTPATIENT
Start: 2021-01-07 | End: 2021-01-14

## 2021-01-07 RX ORDER — CEFTRIAXONE 1 G/1
1 INJECTION, POWDER, FOR SOLUTION INTRAMUSCULAR; INTRAVENOUS
Status: COMPLETED | OUTPATIENT
Start: 2021-01-07 | End: 2021-01-07

## 2021-01-07 RX ADMIN — POTASSIUM BICARBONATE 50 MEQ: 977.5 TABLET, EFFERVESCENT ORAL at 08:01

## 2021-01-07 RX ADMIN — CEFTRIAXONE SODIUM 1 G: 1 INJECTION, POWDER, FOR SOLUTION INTRAMUSCULAR; INTRAVENOUS at 08:01

## 2021-01-10 LAB
BACTERIA UR CULT: ABNORMAL
BACTERIA UR CULT: ABNORMAL
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
## 2021-01-28 PROBLEM — E78.5 DYSLIPIDEMIA: Status: RESOLVED | Noted: 2020-10-25 | Resolved: 2021-01-01

## 2021-01-28 PROBLEM — E78.2 MIXED HYPERLIPIDEMIA: Status: ACTIVE | Noted: 2021-01-01

## 2021-01-28 PROBLEM — Z87.440 HISTORY OF UTI: Status: ACTIVE | Noted: 2021-01-01

## 2021-01-28 PROBLEM — M81.0 AGE-RELATED OSTEOPOROSIS WITHOUT CURRENT PATHOLOGICAL FRACTURE: Status: ACTIVE | Noted: 2021-01-01

## 2021-01-28 PROBLEM — I70.0 AORTIC ATHEROSCLEROSIS: Status: ACTIVE | Noted: 2021-01-01

## 2021-01-28 PROBLEM — Z91.81 AT RISK FOR FALLS: Status: ACTIVE | Noted: 2021-01-01

## 2021-04-12 PROBLEM — R23.9 ALTERATION IN SKIN INTEGRITY: Status: RESOLVED | Noted: 2019-12-27 | Resolved: 2021-01-01

## 2021-04-12 PROBLEM — N39.0 UTI (URINARY TRACT INFECTION): Status: RESOLVED | Noted: 2020-10-07 | Resolved: 2021-01-01

## 2021-04-12 PROBLEM — E87.6 HYPOKALEMIA: Status: ACTIVE | Noted: 2021-01-01

## 2021-04-12 PROBLEM — I50.32 CHRONIC DIASTOLIC HEART FAILURE: Status: ACTIVE | Noted: 2021-01-01

## 2021-04-12 PROBLEM — S32.591A CLOSED FRACTURE OF RAMUS OF RIGHT PUBIS: Status: RESOLVED | Noted: 2019-12-26 | Resolved: 2021-01-01

## 2021-04-12 PROBLEM — E87.1 HYPONATREMIA: Status: RESOLVED | Noted: 2019-12-26 | Resolved: 2021-01-01

## 2022-01-01 ENCOUNTER — HOSPITAL ENCOUNTER (EMERGENCY)
Facility: HOSPITAL | Age: 87
Discharge: HOSPICE | End: 2022-01-11
Attending: EMERGENCY MEDICINE
Payer: MEDICARE

## 2022-01-01 VITALS
HEIGHT: 62 IN | BODY MASS INDEX: 17.48 KG/M2 | HEART RATE: 96 BPM | WEIGHT: 95 LBS | SYSTOLIC BLOOD PRESSURE: 76 MMHG | DIASTOLIC BLOOD PRESSURE: 46 MMHG | TEMPERATURE: 98 F | OXYGEN SATURATION: 100 % | RESPIRATION RATE: 20 BRPM

## 2022-01-01 DIAGNOSIS — T68.XXXA HYPOTHERMIA, INITIAL ENCOUNTER: Primary | ICD-10-CM

## 2022-01-01 DIAGNOSIS — D72.829 LEUKOCYTOSIS, UNSPECIFIED TYPE: ICD-10-CM

## 2022-01-01 DIAGNOSIS — N17.9 ACUTE RENAL FAILURE, UNSPECIFIED ACUTE RENAL FAILURE TYPE: ICD-10-CM

## 2022-01-01 DIAGNOSIS — R41.82 AMS (ALTERED MENTAL STATUS): ICD-10-CM

## 2022-01-01 DIAGNOSIS — E87.20 ACIDOSIS: ICD-10-CM

## 2022-01-01 DIAGNOSIS — G93.40 ACUTE ENCEPHALOPATHY: ICD-10-CM

## 2022-01-01 DIAGNOSIS — E87.5 HYPERKALEMIA: ICD-10-CM

## 2022-01-01 DIAGNOSIS — N39.0 URINARY TRACT INFECTION WITHOUT HEMATURIA, SITE UNSPECIFIED: ICD-10-CM

## 2022-01-01 DIAGNOSIS — A41.9 SEPSIS, DUE TO UNSPECIFIED ORGANISM, UNSPECIFIED WHETHER ACUTE ORGAN DYSFUNCTION PRESENT: ICD-10-CM

## 2022-01-01 LAB
ALBUMIN SERPL BCP-MCNC: 3 G/DL (ref 3.5–5.2)
ALP SERPL-CCNC: 128 U/L (ref 55–135)
ALT SERPL W/O P-5'-P-CCNC: 13 U/L (ref 10–44)
ANION GAP SERPL CALC-SCNC: 14 MMOL/L (ref 8–16)
ANION GAP SERPL CALC-SCNC: ABNORMAL MMOL/L (ref 8–16)
AST SERPL-CCNC: 30 U/L (ref 10–40)
BACTERIA #/AREA URNS AUTO: ABNORMAL /HPF
BACTERIA BLD CULT: ABNORMAL
BASOPHILS # BLD AUTO: 0.03 K/UL (ref 0–0.2)
BASOPHILS NFR BLD: 0.1 % (ref 0–1.9)
BILIRUB SERPL-MCNC: 0.6 MG/DL (ref 0.1–1)
BILIRUB UR QL STRIP: NEGATIVE
BUN SERPL-MCNC: 145 MG/DL (ref 10–30)
BUN SERPL-MCNC: 174 MG/DL (ref 10–30)
CALCIUM SERPL-MCNC: 8.7 MG/DL (ref 8.7–10.5)
CALCIUM SERPL-MCNC: 9.4 MG/DL (ref 8.7–10.5)
CHLORIDE SERPL-SCNC: 108 MMOL/L (ref 95–110)
CHLORIDE SERPL-SCNC: 109 MMOL/L (ref 95–110)
CLARITY UR REFRACT.AUTO: ABNORMAL
CO2 SERPL-SCNC: 7 MMOL/L (ref 23–29)
CO2 SERPL-SCNC: <5 MMOL/L (ref 23–29)
COLOR UR AUTO: YELLOW
CORTIS SERPL-MCNC: 27.1 UG/DL
CREAT SERPL-MCNC: 7.7 MG/DL (ref 0.5–1.4)
CREAT SERPL-MCNC: 8.8 MG/DL (ref 0.5–1.4)
CTP QC/QA: YES
DIFFERENTIAL METHOD: ABNORMAL
EOSINOPHIL # BLD AUTO: 0 K/UL (ref 0–0.5)
EOSINOPHIL NFR BLD: 0 % (ref 0–8)
ERYTHROCYTE [DISTWIDTH] IN BLOOD BY AUTOMATED COUNT: 14.3 % (ref 11.5–14.5)
EST. GFR  (AFRICAN AMERICAN): 4 ML/MIN/1.73 M^2
EST. GFR  (AFRICAN AMERICAN): 4.7 ML/MIN/1.73 M^2
EST. GFR  (NON AFRICAN AMERICAN): 3.5 ML/MIN/1.73 M^2
EST. GFR  (NON AFRICAN AMERICAN): 4.1 ML/MIN/1.73 M^2
GLUCOSE SERPL-MCNC: 138 MG/DL (ref 70–110)
GLUCOSE SERPL-MCNC: 247 MG/DL (ref 70–110)
GLUCOSE UR QL STRIP: ABNORMAL
HCT VFR BLD AUTO: 37.7 % (ref 37–48.5)
HGB BLD-MCNC: 12.3 G/DL (ref 12–16)
HGB UR QL STRIP: ABNORMAL
HYALINE CASTS UR QL AUTO: 0 /LPF
IMM GRANULOCYTES # BLD AUTO: 0.15 K/UL (ref 0–0.04)
IMM GRANULOCYTES NFR BLD AUTO: 0.7 % (ref 0–0.5)
KETONES UR QL STRIP: ABNORMAL
LACTATE SERPL-SCNC: 1.2 MMOL/L (ref 0.5–2.2)
LACTATE SERPL-SCNC: 2 MMOL/L (ref 0.5–2.2)
LEUKOCYTE ESTERASE UR QL STRIP: ABNORMAL
LYMPHOCYTES # BLD AUTO: 0.6 K/UL (ref 1–4.8)
LYMPHOCYTES NFR BLD: 2.8 % (ref 18–48)
MAGNESIUM SERPL-MCNC: 2.6 MG/DL (ref 1.6–2.6)
MCH RBC QN AUTO: 34 PG (ref 27–31)
MCHC RBC AUTO-ENTMCNC: 32.6 G/DL (ref 32–36)
MCV RBC AUTO: 104 FL (ref 82–98)
MICROSCOPIC COMMENT: ABNORMAL
MONOCYTES # BLD AUTO: 1 K/UL (ref 0.3–1)
MONOCYTES NFR BLD: 4.8 % (ref 4–15)
NEUTROPHILS # BLD AUTO: 19 K/UL (ref 1.8–7.7)
NEUTROPHILS NFR BLD: 91.6 % (ref 38–73)
NITRITE UR QL STRIP: NEGATIVE
NRBC BLD-RTO: 0 /100 WBC
PH UR STRIP: 6 [PH] (ref 5–8)
PHOSPHATE SERPL-MCNC: 9.9 MG/DL (ref 2.7–4.5)
PLATELET # BLD AUTO: 241 K/UL (ref 150–450)
PMV BLD AUTO: 8.8 FL (ref 9.2–12.9)
POCT GLUCOSE: 297 MG/DL (ref 70–110)
POTASSIUM SERPL-SCNC: 5 MMOL/L (ref 3.5–5.1)
POTASSIUM SERPL-SCNC: 6.5 MMOL/L (ref 3.5–5.1)
PROT SERPL-MCNC: 7 G/DL (ref 6–8.4)
PROT UR QL STRIP: ABNORMAL
RBC # BLD AUTO: 3.62 M/UL (ref 4–5.4)
RBC #/AREA URNS AUTO: 50 /HPF (ref 0–4)
SARS-COV-2 RDRP RESP QL NAA+PROBE: NEGATIVE
SODIUM SERPL-SCNC: 129 MMOL/L (ref 136–145)
SODIUM SERPL-SCNC: 130 MMOL/L (ref 136–145)
SP GR UR STRIP: 1.02 (ref 1–1.03)
TSH SERPL DL<=0.005 MIU/L-ACNC: 0.65 UIU/ML (ref 0.4–4)
URN SPEC COLLECT METH UR: ABNORMAL
WBC # BLD AUTO: 20.72 K/UL (ref 3.9–12.7)
WBC #/AREA URNS AUTO: 10 /HPF (ref 0–5)
WBC CLUMPS UR QL AUTO: ABNORMAL

## 2022-01-01 PROCEDURE — 80048 BASIC METABOLIC PNL TOTAL CA: CPT | Performed by: PHYSICIAN ASSISTANT

## 2022-01-01 PROCEDURE — 96375 TX/PRO/DX INJ NEW DRUG ADDON: CPT | Mod: 59

## 2022-01-01 PROCEDURE — 84100 ASSAY OF PHOSPHORUS: CPT

## 2022-01-01 PROCEDURE — 99285 PR EMERGENCY DEPT VISIT,LEVEL V: ICD-10-PCS | Mod: ,,, | Performed by: INTERNAL MEDICINE

## 2022-01-01 PROCEDURE — 63600175 PHARM REV CODE 636 W HCPCS

## 2022-01-01 PROCEDURE — 93010 ELECTROCARDIOGRAM REPORT: CPT | Mod: ,,, | Performed by: INTERNAL MEDICINE

## 2022-01-01 PROCEDURE — U0002 COVID-19 LAB TEST NON-CDC: HCPCS | Performed by: EMERGENCY MEDICINE

## 2022-01-01 PROCEDURE — 96366 THER/PROPH/DIAG IV INF ADDON: CPT

## 2022-01-01 PROCEDURE — 99285 EMERGENCY DEPT VISIT HI MDM: CPT | Mod: ,,, | Performed by: INTERNAL MEDICINE

## 2022-01-01 PROCEDURE — 93005 ELECTROCARDIOGRAM TRACING: CPT

## 2022-01-01 PROCEDURE — 85025 COMPLETE CBC W/AUTO DIFF WBC: CPT

## 2022-01-01 PROCEDURE — 87186 SC STD MICRODIL/AGAR DIL: CPT

## 2022-01-01 PROCEDURE — 80053 COMPREHEN METABOLIC PANEL: CPT

## 2022-01-01 PROCEDURE — 25000003 PHARM REV CODE 250

## 2022-01-01 PROCEDURE — 82962 GLUCOSE BLOOD TEST: CPT

## 2022-01-01 PROCEDURE — 51702 INSERT TEMP BLADDER CATH: CPT

## 2022-01-01 PROCEDURE — 96365 THER/PROPH/DIAG IV INF INIT: CPT

## 2022-01-01 PROCEDURE — 83735 ASSAY OF MAGNESIUM: CPT

## 2022-01-01 PROCEDURE — 99291 CRITICAL CARE FIRST HOUR: CPT | Mod: GC,CS,, | Performed by: EMERGENCY MEDICINE

## 2022-01-01 PROCEDURE — 87040 BLOOD CULTURE FOR BACTERIA: CPT | Mod: 59

## 2022-01-01 PROCEDURE — 82533 TOTAL CORTISOL: CPT

## 2022-01-01 PROCEDURE — 87077 CULTURE AEROBIC IDENTIFY: CPT

## 2022-01-01 PROCEDURE — 93010 EKG 12-LEAD: ICD-10-PCS | Mod: ,,, | Performed by: INTERNAL MEDICINE

## 2022-01-01 PROCEDURE — 25000003 PHARM REV CODE 250: Performed by: STUDENT IN AN ORGANIZED HEALTH CARE EDUCATION/TRAINING PROGRAM

## 2022-01-01 PROCEDURE — 96361 HYDRATE IV INFUSION ADD-ON: CPT

## 2022-01-01 PROCEDURE — 25000003 PHARM REV CODE 250: Performed by: EMERGENCY MEDICINE

## 2022-01-01 PROCEDURE — 83605 ASSAY OF LACTIC ACID: CPT

## 2022-01-01 PROCEDURE — 99291 CRITICAL CARE FIRST HOUR: CPT | Mod: 25

## 2022-01-01 PROCEDURE — 84443 ASSAY THYROID STIM HORMONE: CPT

## 2022-01-01 PROCEDURE — 81001 URINALYSIS AUTO W/SCOPE: CPT

## 2022-01-01 PROCEDURE — 63600175 PHARM REV CODE 636 W HCPCS: Performed by: EMERGENCY MEDICINE

## 2022-01-01 PROCEDURE — 99291 PR CRITICAL CARE, E/M 30-74 MINUTES: ICD-10-PCS | Mod: GC,CS,, | Performed by: EMERGENCY MEDICINE

## 2022-01-01 PROCEDURE — 96367 TX/PROPH/DG ADDL SEQ IV INF: CPT | Mod: 59

## 2022-01-01 RX ORDER — VANCOMYCIN HCL IN 5 % DEXTROSE 1G/250ML
1000 PLASTIC BAG, INJECTION (ML) INTRAVENOUS
Status: COMPLETED | OUTPATIENT
Start: 2022-01-01 | End: 2022-01-01

## 2022-01-01 RX ORDER — DEXTROSE 50 % IN WATER (D50W) INTRAVENOUS SYRINGE
50
Status: COMPLETED | OUTPATIENT
Start: 2022-01-01 | End: 2022-01-01

## 2022-01-01 RX ORDER — DEXTROSE 50 % IN WATER (D50W) INTRAVENOUS SYRINGE
50
Status: DISCONTINUED | OUTPATIENT
Start: 2022-01-01 | End: 2022-01-01

## 2022-01-01 RX ORDER — MORPHINE SULFATE 2 MG/ML
1 INJECTION, SOLUTION INTRAMUSCULAR; INTRAVENOUS
Status: DISCONTINUED | OUTPATIENT
Start: 2022-01-01 | End: 2022-01-01 | Stop reason: HOSPADM

## 2022-01-01 RX ORDER — LORAZEPAM 2 MG/ML
0.5 INJECTION INTRAMUSCULAR EVERY 30 MIN PRN
Status: DISCONTINUED | OUTPATIENT
Start: 2022-01-01 | End: 2022-01-01 | Stop reason: HOSPADM

## 2022-01-01 RX ORDER — ATROPINE SULFATE 10 MG/ML
2 SOLUTION/ DROPS OPHTHALMIC EVERY 4 HOURS PRN
Status: DISCONTINUED | OUTPATIENT
Start: 2022-01-01 | End: 2022-01-01 | Stop reason: HOSPADM

## 2022-01-01 RX ADMIN — MORPHINE SULFATE 1 MG: 2 INJECTION, SOLUTION INTRAMUSCULAR; INTRAVENOUS at 09:01

## 2022-01-01 RX ADMIN — SODIUM CHLORIDE, SODIUM LACTATE, POTASSIUM CHLORIDE, AND CALCIUM CHLORIDE 1000 ML: .6; .31; .03; .02 INJECTION, SOLUTION INTRAVENOUS at 12:01

## 2022-01-01 RX ADMIN — SODIUM CHLORIDE, SODIUM LACTATE, POTASSIUM CHLORIDE, AND CALCIUM CHLORIDE 1000 ML: .6; .31; .03; .02 INJECTION, SOLUTION INTRAVENOUS at 02:01

## 2022-01-01 RX ADMIN — PIPERACILLIN AND TAZOBACTAM 4.5 G: 4; .5 INJECTION, POWDER, LYOPHILIZED, FOR SOLUTION INTRAVENOUS; PARENTERAL at 02:01

## 2022-01-01 RX ADMIN — DEXTROSE MONOHYDRATE 50 G: 25 INJECTION, SOLUTION INTRAVENOUS at 03:01

## 2022-01-01 RX ADMIN — SODIUM BICARBONATE: 84 INJECTION, SOLUTION INTRAVENOUS at 05:01

## 2022-01-01 RX ADMIN — VANCOMYCIN HYDROCHLORIDE 1000 MG: 1 INJECTION, POWDER, LYOPHILIZED, FOR SOLUTION INTRAVENOUS at 03:01

## 2022-01-01 RX ADMIN — INSULIN HUMAN 5 UNITS: 100 INJECTION, SOLUTION PARENTERAL at 03:01

## 2022-01-10 PROBLEM — E87.20 ACIDOSIS: Status: ACTIVE | Noted: 2022-01-01

## 2022-01-10 PROBLEM — E87.5 HYPERKALEMIA: Status: ACTIVE | Noted: 2022-01-01

## 2022-01-10 PROBLEM — E86.9 VOLUME DEPLETION: Status: ACTIVE | Noted: 2022-01-01

## 2022-01-10 PROBLEM — A41.9 SEPTIC SHOCK: Status: ACTIVE | Noted: 2022-01-01

## 2022-01-10 PROBLEM — N17.9 AKI (ACUTE KIDNEY INJURY): Status: ACTIVE | Noted: 2022-01-01

## 2022-01-10 PROBLEM — E87.29 STARVATION KETOACIDOSIS: Status: ACTIVE | Noted: 2022-01-01

## 2022-01-10 PROBLEM — R65.21 SEPTIC SHOCK: Status: ACTIVE | Noted: 2022-01-01

## 2022-01-10 PROBLEM — Z51.5 COMFORT MEASURES ONLY STATUS: Status: ACTIVE | Noted: 2022-01-01

## 2022-01-10 PROBLEM — T73.0XXA STARVATION KETOACIDOSIS: Status: ACTIVE | Noted: 2022-01-01

## 2022-01-10 NOTE — HPI
89 year old female brought to the ER by her son after he noticed she was not getting better and becoming less responsive over last week worsening over prior 3 days.  In ER she has un measurable bicarb and hyperkalemia and creatinine of 8.  Nephrology consulted for Ismael and hyperkalemia

## 2022-01-10 NOTE — ASSESSMENT & PLAN NOTE
-given 2L LR   -add IV fluid as isotonic bicarb drip 500cc bolus followed by 50 cc/hr and echo to assess ability to tolerate more fluid

## 2022-01-10 NOTE — SUBJECTIVE & OBJECTIVE
Past Medical History:   Diagnosis Date    Alteration in skin integrity 12/27/2019    CHF (congestive heart failure)     Closed fracture of ramus of right pubis 12/26/2019    Dyslipidemia     Hyperlipidemia     Hypertension        Past Surgical History:   Procedure Laterality Date    BIOPSY OF BLADDER N/A 10/9/2020    Procedure: BIOPSY, BLADDER;  Surgeon: Leonora Hercules MD;  Location: [facility] OR [address] [place] [address];  Service: Urology;  Laterality: N/A;    BLADDER FULGURATION N/A 10/9/2020    Procedure: FULGURATION, BLADDER;  Surgeon: Leonora Hercules MD;  [facility];  Service: Urology;  Laterality: N/A;    CYSTOSCOPY N/A 10/9/2020    Procedure: CYSTOSCOPY;  Surgeon: Leonora Hercules MD;  Location: [facility] OR [address] [place] [address];  Service: Urology;  Laterality: N/A;    HYSTERECTOMY      REMOVAL OF BLOOD CLOT N/A 10/9/2020    Procedure: REMOVAL, BLOOD CLOT;  Surgeon: Leonora Hercules MD;  Location: [facility] OR [address] [place] [address];  Service: Urology;  Laterality: N/A;    RETROGRADE PYELOGRAPHY Bilateral 10/9/2020    Procedure: PYELOGRAM, RETROGRADE;  Surgeon: Leonora Hercules MD;  Location: [facility] OR [address] [place] [address];  Service: Urology;  Laterality: Bilateral;    tonsils         Review of patient's allergies indicates:  No Known Allergies  Current Facility-Administered Medications   Medication Frequency    calcium gluconate 1g in dextrose 5% 100mL (ready to mix system) ED 1 Time    sodium bicarbonate 150 mEq in dextrose 5 % 1,000 mL infusion Continuous     Current Outpatient Medications   Medication    ANTIOX #11/OM3/DHA/EPA/LUT/SATYA (OCUVITE ADULT 50+ ORAL)    ferrous sulfate 325 (65 FE) MG EC tablet    lisinopriL 10 MG tablet    tolterodine (DETROL LA) 4 MG 24 hr capsule     Family History     Problem Relation (Age of Onset)    Heart disease Father        Tobacco Use    Smoking status: Never Smoker    Smokeless tobacco: Never Used   Substance and Sexual Activity    Alcohol use: No    Drug use: Never    Sexual activity: Not  Currently     Review of Systems   Unable to perform ROS: Mental status change     Objective:     Vital Signs (Most Recent):  Temp: (!) 93.9 °F (34.4 °C) (01/10/22 1609)  Pulse: 106 (01/10/22 1609)  Resp: (!) 23 (01/10/22 1609)  BP: (!) 119/59 (01/10/22 1609)  SpO2: 100 % (01/10/22 1609)  O2 Device (Oxygen Therapy): nasal cannula (01/10/22 1022) Vital Signs (24h Range):  Temp:  [93.6 °F (34.2 °C)-94.8 °F (34.9 °C)] 93.9 °F (34.4 °C)  Pulse:  [] 106  Resp:  [14-41] 23  SpO2:  [100 %] 100 %  BP: ()/(44-66) 119/59     Weight: 43.1 kg (95 lb 0.3 oz) (01/10/22 1421)  Body mass index is 17.38 kg/m².  Body surface area is 1.37 meters squared.    No intake/output data recorded.    Physical Exam  Constitutional:       Appearance: She is ill-appearing.   HENT:      Mouth/Throat:      Mouth: Mucous membranes are dry.      Comments: With crust on tongue  Eyes:      Pupils: Pupils are equal, round, and reactive to light.   Cardiovascular:      Rate and Rhythm: Normal rate and regular rhythm.   Pulmonary:      Comments: RR lower than expected for level of bicarb  Abdominal:      General: There is no distension.      Palpations: Abdomen is soft.   Musculoskeletal:         General: No deformity.      Right lower leg: No edema.      Left lower leg: No edema.   Skin:     General: Skin is dry.      Coloration: Skin is not jaundiced.      Comments: tinting   Neurological:      Mental Status: She is disoriented.      Comments: Did not arouse to name or glabellar tap         Significant Labs:  All labs within the past 24 hours have been reviewed.    Significant Imaging:  Labs: Reviewed  X-Ray: Reviewed

## 2022-01-10 NOTE — ASSESSMENT & PLAN NOTE
-acute secondary to shifting from acidosis and kidney failure  -IV calcium gluconate, IV insulin, d50, albuterol as toelrated  -stat ABG, give bicarb 50meq and repeat ABG after 1 hour and repeat 50meq bicarb if pH < 7.2  -IV fluid as isotonic bicarb drip 500cc bolus followed by 50 cc/hr and echo to assess ability to tolerate more fluid  -NG or OG tube with zirconium 10g TID

## 2022-01-10 NOTE — CONSULTS
In short, Elisha Young is a 89 year old female with HTN and HLD who presented to [facility] ED 1/10/2022 for AMS for past 3 days. Labs significant for acute renal failure with hyperkalemia and bicarb <5. Patient also likely septic from urinary source. Nephrology was consulted and deemed patient not a dialysis candidate. They spoke with the son, Bull Goyal, who agreed patient would not want dialysis.     MICU was also consulted for TOBY and hyperkalemia. I discussed further with the son that if patient does not get dialysis she will die from this illness. Son states understanding. I described transitioning to comfort care which involves focusing on symptom management with medications to allow her to pass naturally. Son agrees and asked about hospice options. I told him the patient will likely need IP hospice and that the  will give him a call to help facilitate this process. Condolences were offered. All other questions were answered and concerns addressed.     Patient does not need to be admitted to the MICU to facilitate this process. Please order PRN opioids and BZP for symptom management.     Plan of care discussed with ED staff, Dr. Kelly, and the .     Thank you for the consult. We will sign off. Please call for any further questions regarding the care of Elisha Young.     Randi Meyer PA-C  Critical Care Medicine  1/10/2022   5:48 PM

## 2022-01-10 NOTE — ASSESSMENT & PLAN NOTE
-stat ABG, give bicarb 50meq and repeat ABG after 1 hour and repeat 50meq bicarb if pH < 7.2  -rest per primary

## 2022-01-10 NOTE — ED PROVIDER NOTES
Encounter Date: 1/10/2022       History     Chief Complaint   Patient presents with    Altered Mental Status     X2 days diarrhea and urine incontinence over the past few days     Elisha Young is a 89 y.o. female with history of hypertension, hyperlipidemia presenting with altered mental status x3 days.  Most of history is provided by the patient's son.  He states that later last week she started to eat last and would come less engaged.  On Saturday she became unresponsive, became nonverbal, and only stayed in her bed.  He says during this time she is going progressively worse and has been incontinent to urine and feces.  She is dark black colored stools and has been making very little urine.  Her neurologic baseline which she was at on Monday is ambulating with a walker and he appropriately engaged and conversational.  Over the course of this week she became progressively worse until Saturday she became unresponsive and has remained that way since then.     The history is provided by the patient and a relative.     Review of patient's allergies indicates:  No Known Allergies  Past Medical History:   Diagnosis Date    Alteration in skin integrity 12/27/2019    CHF (congestive heart failure)     Closed fracture of ramus of right pubis 12/26/2019    Dyslipidemia     Hyperlipidemia     Hypertension      Past Surgical History:   Procedure Laterality Date    BIOPSY OF BLADDER N/A 10/9/2020    Procedure: BIOPSY, BLADDER;  Surgeon: Leonora Hercules MD;  Location: [address];  Service: Urology;  Laterality: N/A;    BLADDER FULGURATION N/A 10/9/2020    Procedure: FULGURATION, BLADDER;  Surgeon: Leonora Hercules MD;  Location: [address];  Service: Urology;  Laterality: N/A;    CYSTOSCOPY N/A 10/9/2020    Procedure: CYSTOSCOPY;  Surgeon: Leonora Hercules MD;  Location: [address];  Service: Urology;  Laterality: N/A;    HYSTERECTOMY      REMOVAL OF BLOOD CLOT N/A 10/9/2020    Procedure:  REMOVAL, BLOOD CLOT;  Surgeon: Leonora Hercules MD;  Location: [facility] OR [address] [place] [address];  Service: Urology;  Laterality: N/A;    RETROGRADE PYELOGRAPHY Bilateral 10/9/2020    Procedure: PYELOGRAM, RETROGRADE;  Surgeon: Leonora Hercules MD;  Location: [facility] OR [address] [place] [address];  Service: Urology;  Laterality: Bilateral;    tonsils       Family History   Problem Relation Age of Onset    Heart disease Father      Social History     Tobacco Use    Smoking status: Never Smoker    Smokeless tobacco: Never Used   Substance Use Topics    Alcohol use: No    Drug use: Never     Review of Systems   Unable to perform ROS: Patient unresponsive (Limited ROS provided by patient's son)   Constitutional: Positive for activity change ( Sits in bed all day for the past 3 days) and appetite change. Negative for fever.   Gastrointestinal: Positive for diarrhea ( Dark colored). Negative for vomiting.   Genitourinary: Positive for decreased urine volume.   Neurological: Positive for speech difficulty and weakness.   Psychiatric/Behavioral: Positive for confusion.       Physical Exam     Initial Vitals   BP Pulse Resp Temp SpO2   01/10/22 1022 01/10/22 1022 01/10/22 1022 01/10/22 1327 01/10/22 1342   118/66 95 (!) 30 (!) 94.8 °F (34.9 °C) 100 %      MAP       --                Physical Exam    Nursing note and vitals reviewed.  Constitutional: She appears well-developed and well-nourished. She appears distressed.   HENT:   Head: Normocephalic and atraumatic.   Dry mucous membranes   Eyes: No scleral icterus.   Small pupils, pale conjunctiva   Neck: Neck supple. No tracheal deviation present. No JVD present.   Cardiovascular: Regular rhythm, normal heart sounds and intact distal pulses. Exam reveals no gallop and no friction rub.    No murmur heard.  Tachycardic   Pulmonary/Chest: Breath sounds normal. No stridor. No respiratory distress. She has no wheezes. She has no rhonchi. She has no rales.   Tachypneic, increased work of breathing   Abdominal:  Abdomen is soft. Bowel sounds are normal. She exhibits no distension. There is no abdominal tenderness. There is no guarding.   Genitourinary: Rectum:      Guaiac result negative.   Guaiac negative stool.    Genitourinary Comments: Melanotic appearing stools on BEHZAD that was guaiac negative, while performing BEHZAD purulent material was expressed form the patients vagina/urethra      Musculoskeletal:         General: No edema.      Cervical back: Neck supple.     Neurological: GCS eye subscore is 4. GCS verbal subscore is 5. GCS motor subscore is 6.   Minimally responsive, mumbles random words and sounds, localizes to pain, Neurological exam limited due to patient's AMS   Skin: Skin is warm and dry. Capillary refill takes 2 to 3 seconds. No rash noted. No erythema.         ED Course   Procedures  Labs Reviewed   CBC W/ AUTO DIFFERENTIAL - Abnormal; Notable for the following components:       Result Value    WBC 20.72 (*)     RBC 3.62 (*)      (*)     MCH 34.0 (*)     MPV 8.8 (*)     Immature Granulocytes 0.7 (*)     Gran # (ANC) 19.0 (*)     Immature Grans (Abs) 0.15 (*)     Lymph # 0.6 (*)     Gran % 91.6 (*)     Lymph % 2.8 (*)     All other components within normal limits   COMPREHENSIVE METABOLIC PANEL - Abnormal; Notable for the following components:    Sodium 129 (*)     Potassium 6.5 (*)     CO2 <5 (*)     Glucose 138 (*)      (*)     Creatinine 8.8 (*)     Albumin 3.0 (*)     eGFR if  4.0 (*)     eGFR if non  3.5 (*)     All other components within normal limits   URINALYSIS, REFLEX TO URINE CULTURE - Abnormal; Notable for the following components:    Appearance, UA Cloudy (*)     Protein, UA 2+ (*)     Glucose, UA 1+ (*)     Ketones, UA Trace (*)     Occult Blood UA 2+ (*)     Leukocytes, UA 1+ (*)     All other components within normal limits    Narrative:     Specimen Source->Urine   PHOSPHORUS - Abnormal; Notable for the following components:    Phosphorus 9.9  (*)     All other components within normal limits   BASIC METABOLIC PANEL - Abnormal; Notable for the following components:    Sodium 130 (*)     CO2 7 (*)     Glucose 247 (*)      (*)     Creatinine 7.7 (*)     eGFR if  4.7 (*)     eGFR if non  4.1 (*)     All other components within normal limits   URINALYSIS MICROSCOPIC - Abnormal; Notable for the following components:    RBC, UA 50 (*)     WBC, UA 10 (*)     All other components within normal limits    Narrative:     Specimen Source->Urine   POCT GLUCOSE - Abnormal; Notable for the following components:    POCT Glucose 297 (*)     All other components within normal limits   SARS-COV-2 RDRP GENE - Normal    Narrative:     This test utilizes isothermal nucleic acid amplification   technology to detect the SARS-CoV-2 RdRp nucleic acid segment.   The analytical sensitivity (limit of detection) is 125 genome   equivalents/mL.   A POSITIVE result implies infection with the SARS-CoV-2 virus;   the patient is presumed to be contagious.     A NEGATIVE result means that SARS-CoV-2 nucleic acids are not   present above the limit of detection. A NEGATIVE result should be   treated as presumptive. It does not rule out the possibility of   COVID-19 and should not be the sole basis for treatment decisions.   If COVID-19 is strongly suspected based on clinical and exposure   history, re-testing using an alternate molecular assay should be   considered.   This test is only for use under the Food and Drug   Administration s Emergency Use Authorization (EUA).   Commercial kits are provided by [employer].   Performance characteristics of the EUA have been independently   verified by [facility] Department of   Pathology and Laboratory Medicine.   _________________________________________________________________   The authorized Fact Sheet for Healthcare Providers and the authorized Fact   Sheet for Patients of the ID NOW  COVID-19 are available on the FDA   website:     https://www.fda.gov/media/481185/download  https://www.fda.gov/media/046696/download       CULTURE, BLOOD   CULTURE, BLOOD   LACTIC ACID, PLASMA   TSH   CORTISOL, RANDOM   TSH   CORTISOL, RANDOM   MAGNESIUM   PHOSPHORUS   MAGNESIUM   LACTIC ACID, PLASMA   POCT GLUCOSE MONITORING CONTINUOUS     EKG Readings: (Independently Interpreted)   Initial Reading: No STEMI. Rhythm: Accelerated Junctional Rhythm. Heart Rate: 105. Ectopy: PVCs. Conduction: Normal. Axis: Left Axis Deviation.       Imaging Results          X-Ray Chest AP Portable (Final result)  Result time 01/10/22 13:15:57    Final result by Aleyda Henao MD (01/10/22 13:15:57)                 Impression:      Lower lung volumes, which may be exaggerating the pulmonary vascularity.  No new consolidations.  Other findings stable.      Electronically signed by: Aleyda Henao  Date:    01/10/2022  Time:    13:15             Narrative:    EXAMINATION:  XR CHEST AP PORTABLE    CLINICAL HISTORY:  Sepsis;    TECHNIQUE:  Single frontal view of the chest was performed.    COMPARISON:  12/26/2019    FINDINGS:  The lung volumes are expiratory.  This is likely exaggerating the pulmonary vascularity in the cardiac size.  The heart size appears normal.  There is mild unfolding and atherosclerotic stigmata of the aorta.  There is a moderate-severe dextrocurvature of the spine.  There are degenerative changes of both shoulders.                               CT Head Without Contrast (Final result)  Result time 01/10/22 13:12:18    Final result by Marcus Dumont MD (01/10/22 13:12:18)                 Impression:      Motion artifact.    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    No acute intracranial process otherwise suggested.      Electronically signed by: Marcus Dumont  Date:    01/10/2022  Time:    13:12             Narrative:    EXAMINATION:  CT HEAD WITHOUT CONTRAST    CLINICAL  "HISTORY:  Mental status change, unknown cause;    TECHNIQUE:  Low dose axial images were obtained through the head.  Coronal and sagittal reformations were also performed. Contrast was not administered.    COMPARISON:  None.    FINDINGS:  There is no evidence of hydrocephalus, mass effect, intracranial hemorrhage or acute territorial infarct.  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  Decreased attenuation within the periventricular and subcortical white matter is nonspecific may reflect mild chronic small vessel ischemic change.    Atherosclerotic vascular calcifications are noted at the skull base.    No calvarial fracture.    Visualized sinuses and mastoid air cells are clear.                                 Medications   calcium gluconate 1g in dextrose 5% 100mL (ready to mix system) (0 g Intravenous Stopped 1/10/22 1938)   sodium bicarbonate 150 mEq in dextrose 5 % 1,000 mL infusion ( Intravenous New Bag 1/10/22 1739)   lactated ringers bolus 1,000 mL (0 mLs Intravenous Stopped 1/10/22 1426)   vancomycin in dextrose 5 % 1 gram/250 mL IVPB 1,000 mg (0 mg Intravenous Stopped 1/10/22 1716)   piperacillin-tazobactam 4.5 g in sodium chloride 0.9% 100 mL IVPB (ready to mix system) (0 g Intravenous Stopped 1/10/22 1458)   insulin regular injection 5 Units (5 Units Intravenous Given 1/10/22 1521)   lactated ringers bolus 1,000 mL (0 mLs Intravenous Stopped 1/10/22 1515)   dextrose 50 % in water (D50W) injection 50 g (50 g Intravenous Given 1/10/22 1520)     Medical Decision Making:   Initial Assessment:   89-year-old barely responsive female with altered mental status  Differential Diagnosis:   Sepsis, metabolic encephalopathy, ICH, UTI, pneumonia  Clinical Tests:   Lab Tests: Ordered and Reviewed  Radiological Study: Ordered and Reviewed  Medical Tests: Ordered and Reviewed  Sepsis Perfusion Assessment: "I attest a sepsis perfusion exam was performed within 6 hours of " "sepsis, severe sepsis, or septic shock presentation, following fluid resuscitation."  ED Management:  Septic workup started 1 L fluids given for dehydration.  Patient found to be hypothermic and 94.8°, placed on Bear Hugger.  CXR unremarkable.  Discussed goals of care with patient's son who wanted to make patient DNR.  CBC showing white count of 20 with no anemia.  Patient meets SIRS criteria and will treat with vanc and Zosyn.  CMP showing potassium of 6.5, sodium 129, creatinine of 8.8, BUN of 174, bicarb under 5. Patient is hypokalemic and has acute renal failure.  EKG showing accelerated junctional rhythm which is changed from patient's baseline.  Will administer calcium gluconate due to EKG changes and a shift potassium with 5 units of insulin and 50 g of dextrose.  Awaiting results of VBG to determine whether bicarb drip would help patient's acidosis.  Nephrology consulted.  Critical care consulted.  2 L fluids ordered as patient's blood pressure is not responding appropriately to 1st L. Patient signed off to oncoming team pending the UA, cortisol, TSH, RP ultrasound and final disposition            Attending Attestation:   Physician Attestation Statement for Resident:  As the supervising MD   Physician Attestation Statement: I have personally seen and examined this patient.   I agree with the above history. -: Patient is unable to provide her history.  According to her son he has noticed a decline over the past week.  Patient has not gotten out of her chair for the past 2 days.  On exam she is minimally arousable to sternal rub.  She is tachypneic.  She is satting okay.  She is hypothermic.  I discussed code status with the patient's son and she is DNAR.  Applied Mohsen Hugger.  Broad-spectrum antibiotics.  Treatment for sepsis.   As the supervising MD I agree with the above PE.    As the supervising MD I agree with the above treatment, course, plan, and disposition.        Attending Critical Care:   Critical " Care Times:   Direct Patient Care (initial evaluation, reassessments, and time considering the case)................................................................25 minutes.   Additional History from reviewing old medical records or taking additional history from the family, EMS, PCP, etc.......................10 minutes.   Ordering, Reviewing, and Interpreting Diagnostic Studies...............................................................................................................5 minutes.   Documentation..................................................................................................................................................................................5 minutes.   Consultation with other Physicians. .................................................................................................................................................10 minutes.   ==============================================================  · Total Critical Care Time - exclusive of procedural time: 55 minutes.  ==============================================================  Critical care was necessary to treat or prevent imminent or life-threatening deterioration of the following conditions: sepsis.           ED Course as of 01/10/22 2049   Mon Malik 10, 2022   1403 Potassium(!!): 6.5 [AW]   1403 CO2(!!): <5 [AW]   1403 WBC(!): 20.72 [AW]   1447 Sodium(!): 129 [AW]   1447 Creatinine(!): 8.8 [AW]   1447 BUN(!): 174 [AW]      ED Course User Index  [AW] Nilton Thrasher MD             Clinical Impression:   Final diagnoses:  [R41.82] AMS (altered mental status)  [T68.XXXA] Hypothermia, initial encounter (Primary)  [G93.40] Acute encephalopathy  [D72.829] Leukocytosis, unspecified type  [A41.9] Sepsis, due to unspecified organism, unspecified whether acute organ dysfunction present  [N39.0] Urinary tract infection without hematuria, site unspecified  [E87.5] Hyperkalemia  [E87.2] Acidosis  [N17.9] Acute  renal failure, unspecified acute renal failure type          ED Disposition Condition    Admit               Nilton Thrasher MD  Resident  01/10/22 1452       Lm Leger MD  01/10/22 2050

## 2022-01-10 NOTE — ASSESSMENT & PLAN NOTE
-likely ischemic ATN secondary to hypotension due to septic shock from urosepsis and volume depletion from poor oral intake  -BL sCr 0.6  -urine in ramos red with pus and dipstick with 3+ leukocytes, spec grav 2020, and ketones  -creatinine 8 on consult with bicarb < 5  -discussed with son about prognosis with and without dialysis and he said she would not want dialysis  -order UA, Urine Culture, kidney ultrasound  -at this point, we recommend medical treatment of shock, volume depletion, acidosis, and hyperkalemia as per problems listed

## 2022-01-10 NOTE — CONSULTS
[facility] - Emergency Dept  Nephrology  Consult Note    Patient Name: Elisha Young  MRN: [id number]  Admission Date: 1/10/2022  Hospital Length of Stay: 0 days  Attending Provider: Lm Leger MD   Primary Care Physician: Az Garcia MD  Principal Problem:<principal problem not specified>    Inpatient consult to Nephrology  Consult performed by: Kamron Hernandez MD  Consult ordered by: Nilton Thrasher MD  Reason for consult: ismael hyperkalemia  Assessment/Recommendations: Iv bicarb, fluids, shifting with insulin  No dialysis        Subjective:     HPI: 89 year old female brought to the ER by her son after he noticed she was not getting better and becoming less responsive over last week worsening over prior 3 days.  In ER she has un measurable bicarb and hyperkalemia and creatinine of 8.  Nephrology consulted for Ismael and hyperkalemia      Past Medical History:   Diagnosis Date    Alteration in skin integrity 12/27/2019    CHF (congestive heart failure)     Closed fracture of ramus of right pubis 12/26/2019    Dyslipidemia     Hyperlipidemia     Hypertension        Past Surgical History:   Procedure Laterality Date    BIOPSY OF BLADDER N/A 10/9/2020    Procedure: BIOPSY, BLADDER;  Surgeon: Leonora Hercules MD;  Location: [address];  Service: Urology;  Laterality: N/A;    BLADDER FULGURATION N/A 10/9/2020    Procedure: FULGURATION, BLADDER;  Surgeon: Leonora Hercules MD;  Location: [address];  Service: Urology;  Laterality: N/A;    CYSTOSCOPY N/A 10/9/2020    Procedure: CYSTOSCOPY;  Surgeon: Leonora Hercules MD;  Location: [address];  Service: Urology;  Laterality: N/A;    HYSTERECTOMY      REMOVAL OF BLOOD CLOT N/A 10/9/2020    Procedure: REMOVAL, BLOOD CLOT;  Surgeon: Leonora Hercules MD;  Location: [address];  Service: Urology;  Laterality: N/A;    RETROGRADE PYELOGRAPHY Bilateral 10/9/2020    Procedure: PYELOGRAM, RETROGRADE;  Surgeon: Leonora Hercules MD;   Location: [facility] OR [address] [place] [address];  Service: Urology;  Laterality: Bilateral;    tonsils         Review of patient's allergies indicates:  No Known Allergies  Current Facility-Administered Medications   Medication Frequency    calcium gluconate 1g in dextrose 5% 100mL (ready to mix system) ED 1 Time    sodium bicarbonate 150 mEq in dextrose 5 % 1,000 mL infusion Continuous     Current Outpatient Medications   Medication    ANTIOX #11/OM3/DHA/EPA/LUT/SATYA (OCUVITE ADULT 50+ ORAL)    ferrous sulfate 325 (65 FE) MG EC tablet    lisinopriL 10 MG tablet    tolterodine (DETROL LA) 4 MG 24 hr capsule     Family History     Problem Relation (Age of Onset)    Heart disease Father        Tobacco Use    Smoking status: Never Smoker    Smokeless tobacco: Never Used   Substance and Sexual Activity    Alcohol use: No    Drug use: Never    Sexual activity: Not Currently     Review of Systems   Unable to perform ROS: Mental status change     Objective:     Vital Signs (Most Recent):  Temp: (!) 93.9 °F (34.4 °C) (01/10/22 1609)  Pulse: 106 (01/10/22 1609)  Resp: (!) 23 (01/10/22 1609)  BP: (!) 119/59 (01/10/22 1609)  SpO2: 100 % (01/10/22 1609)  O2 Device (Oxygen Therapy): nasal cannula (01/10/22 1022) Vital Signs (24h Range):  Temp:  [93.6 °F (34.2 °C)-94.8 °F (34.9 °C)] 93.9 °F (34.4 °C)  Pulse:  [] 106  Resp:  [14-41] 23  SpO2:  [100 %] 100 %  BP: ()/(44-66) 119/59     Weight: 43.1 kg (95 lb 0.3 oz) (01/10/22 1421)  Body mass index is 17.38 kg/m².  Body surface area is 1.37 meters squared.    No intake/output data recorded.    Physical Exam  Constitutional:       Appearance: She is ill-appearing.   HENT:      Mouth/Throat:      Mouth: Mucous membranes are dry.      Comments: With crust on tongue  Eyes:      Pupils: Pupils are equal, round, and reactive to light.   Cardiovascular:      Rate and Rhythm: Normal rate and regular rhythm.   Pulmonary:      Comments: RR lower than expected for level of bicarb  Abdominal:       General: There is no distension.      Palpations: Abdomen is soft.   Musculoskeletal:         General: No deformity.      Right lower leg: No edema.      Left lower leg: No edema.   Skin:     General: Skin is dry.      Coloration: Skin is not jaundiced.      Comments: tinting   Neurological:      Mental Status: She is disoriented.      Comments: Did not arouse to name or glabellar tap         Significant Labs:  All labs within the past 24 hours have been reviewed.    Significant Imaging:  Labs: Reviewed  X-Ray: Reviewed    Assessment/Plan:     Hyperkalemia  -acute secondary to shifting from acidosis and kidney failure  -IV calcium gluconate, IV insulin, d50, albuterol as toelrated  -stat ABG, give bicarb 50meq and repeat ABG after 1 hour and repeat 50meq bicarb if pH < 7.2  -IV fluid as isotonic bicarb drip 500cc bolus followed by 50 cc/hr and echo to assess ability to tolerate more fluid  -NG or OG tube with zirconium 10g TID     Metabolic acidosis  -stat ABG, give bicarb 50meq and repeat ABG after 1 hour and repeat 50meq bicarb if pH < 7.2  -rest per primary    Septic shock  -secondary to urosepsis  -urine was pus in ED  -pressers and antibiotics per primary    Starvation ketoacidosis  -ketones on urine dipstick, cosnider beta-hydroxybuterate   -dextrose IV  -rest per primary    Volume depletion  -given 2L LR   -add IV fluid as isotonic bicarb drip 500cc bolus followed by 50 cc/hr and echo to assess ability to tolerate more fluid      TOBY (acute kidney injury)  -likely ischemic ATN secondary to hypotension due to septic shock from urosepsis and volume depletion from poor oral intake  -BL sCr 0.6  -urine in ramos red with pus and dipstick with 3+ leukocytes, spec grav 2020, and ketones  -creatinine 8 on consult with bicarb < 5  -discussed with son about prognosis with and without dialysis and he said she would not want dialysis  -order UA, Urine Culture, kidney ultrasound  -at this point, we recommend medical  treatment of shock, volume depletion, acidosis, and hyperkalemia as per problems listed        Kamron Hernandez MD  Nephrology  [facility] - Emergency Dept

## 2022-01-10 NOTE — SIGNIFICANT EVENT
Discussed with Bull, son, prognosis with and without dialysis. He said that she would not want to be on dialysis. We will help her with other means than by dialysis.  Conversation witness by Santhosh Raymundo RN and Dr. Roberson.    Kamron Hernandez

## 2022-01-10 NOTE — ED TRIAGE NOTES
Pt brought by EMS for AMS per son pt is Normally AOx4. Pt presents AOx1 barely able to state their name. Pt does not follow commands and withdraws from interactions.

## 2022-01-11 NOTE — PLAN OF CARE
[facility] contacted SW. Patient was accepted. Staff stated that there would be a bed in the  morning for patient. SW contacted patient's son and made him aware. Son agreed. [facility] will contact patient's son to paperwork and go over plan of care.     JAK Roche, MSW-SW  Medical Social Worker/  ER Department

## 2022-01-11 NOTE — PLAN OF CARE
YING faxed referral to [facility]. SW contacted patient's son. Son did not answer. SW left a message.     JAK Roche, MSW-LMSW  Medical Social Worker/   Department   [phone]

## 2022-01-11 NOTE — ED NOTES
Patient resting in stretcher and is in NAD at this time. Pt is awake and disoriented x4, VSS, respirations even and unlabored. Pt updated on plan of care. Bed low and locked with side rails up x2.  Will continue to monitor. Transport requested by  to hospice bed

## 2022-01-11 NOTE — ED NOTES
LOC: The patient is awake and calm.  Pt nonverbal, unable to respond to questions appropriately - this is her baseline.     APPEARANCE: Patient resting comfortably in no acute distress.  Patient is clean and well groomed.    SKIN: The skin is warm and dry; color consistent with ethnicity.  Patient has normal skin turgor and moist mucus membranes.  Breakdown noted to sacral area - dressing applied per hospital policy.     MUSCULOSKELETAL: Patient not moving upper or lower extremities - unable to follow commands at this time; no pain in the extremities or back per FACES scale.     RESPIRATORY: Airway is open and patent. Respirations spontaneous, even, easy, and non-labored.  Patient has a normal effort and rate.  No accessory muscle use noted. No cough present.     CARDIAC:  Normal rhythm and rate noted.  No peripheral edema noted. Unable to express any complaints of chest pain - none noted per FACES pain scale - pt resting comfortably in NAD.      ABDOMEN: Soft and non tender to palpation.  No distention noted. Unable to express abdominal pain, nausea, vomiting, diarrhea, or constipation verbally - none of these sx noted per FACES pain scale or observation.     NEUROLOGIC: Eyes open spontaneously intermittently to voice.  Unable to follow commands; facial expression symmetrical.  No headache noted per FACES pain scale; no unilateral weakness noted.

## 2022-01-11 NOTE — ED NOTES
Patient resting in stretcher and is in NAD at this time. Pt is sleeping, disoriented x4, VSS, respirations even and unlabored. Bed low and locked with side rails up x2. Will continue to monitor.

## 2022-01-11 NOTE — ED NOTES
Patient resting in stretcher and is in NAD at this time. Pt is sleeping, respirations even and unlabored. Bed low and locked with side rails up x 2. Repositioned for comfort. Will continue to monitor.

## 2022-01-11 NOTE — ED NOTES
Patient resting in stretcher and is in NAD at this time. Pt is awake but disoriented x4, VSS, respirations even and unlabored. Pt updated on plan of care. Bed low and locked with side rails up x2, call bell in pt reach. Will continue to monitor.

## 2022-01-11 NOTE — PLAN OF CARE
Sw just got a hold of patient's son. Son was made aware of the two places that the patient may be eligible for. Son understood.     YING did reach out to [facility]. Spoke with Irena. Irena informed me that I had to contact someone after hours for admissions. I did contact Lina after several tires at [phone]. Lina informed me that administration was not there and she did not know iff they could take anyone after hours. YING was told to contact facility in the morning. YING did fax over referral to [facility].[phone].    JAK Roche, MSW-LMSW  Medical Social Worker/  ER Department

## 2022-01-11 NOTE — ED NOTES
I assume care of patient at this time. Pt resting comfortably, eyes closed, somnolent. RR are spontaneous, even and non-labored at this time, tolerating room air well.  Skin is warm and dry. Pt soiled, perineal care completed, clean dry brief applied to patient, linens changed and pt re-positioned for comfort. Palliative care continued.  Lights dimmed and blanket applied for comfort, pt changed into hospital gown and socks applied. Inserted urinary catheter remains in place with temperature sensing probe, dressing remains intact clean and dry, IV remains patent and saline locked at this time. Pt remains on cardiac monitor, continuous pulse oximetry and automatic blood pressure cuff cycling w/ alarms set. Bed placed in low locked position, side rails up x 2, call light is within reach of patient or family, alarms set and turned on for monitor and pulse ox, will continue to monitor.     Two patient identifiers have been checked and are correct.      Appearance: Pt is somnolent  Pt in no acute distress at present time. Pt is clean and well groomed with hospital gown appropriately fastened.   Skin: Skin warm, dry & intact. Color consistent with ethnicity. + breakdown noted to sacral area, dressing applied per policy. No brusing noted.   Musculoskeletal: Patient moving all extremities well, no obvious swelling or deformities noted.   Respiratory: Respirations spontaneous, even, and non-labored. Visible chest rise noted. Airway is open and patent. No accessory muscle use noted.   Neurologic:  Eyes open spontaneously intermittently, unable to commands, facial expression symmetrical.   Cardiac: All peripheral pulses present. No Bilateral lower extremity edema. Cap refill is <3 seconds.  Abdomen: Abdomen soft, non-tender to palpation.

## 2022-01-11 NOTE — ED NOTES
Patient resting in stretcher and is in NAD at this time. Pt is awake but disoriented x4, VSS, respirations even and unlabored. Bed low and locked with side rails up x2. Will continue to monitor.

## 2022-01-11 NOTE — PROVIDER PROGRESS NOTES - EMERGENCY DEPT.
Encounter Date: 1/10/2022    ED Physician Progress Notes        Physician Note:   Assumed care of patient from previous provider.  Patient is boarding in the ED, awaiting transfer to hospice in the morning.  Code status DNR.  No acute events overnight.  Will sign out to oncoming attending awaiting transport.

## 2022-01-11 NOTE — PLAN OF CARE
Son, Bull, scheduled to do hospice medicare consents this afternoon at 3:30 p today     [facility] ([facility]) Inpatient Unit  5th floor  [address] [place]  [address]  Fax [phone]  Nurse Report, [phone].   will be picked up between 4;30 P and 5 P today  Non emergent ambulance/adt 30 completed

## 2022-01-11 NOTE — PROVIDER PROGRESS NOTES - EMERGENCY DEPT.
[facility]  Department of Hospital Medicine  [address] [place] [address]  [phone] after hours  [phone] fax    HOSPICE  ORDERS    01/10/2022    Admit to Hospice:  Inpatient Service  Diagnoses:   Active Hospital Problems    Diagnosis  POA    TOBY (acute kidney injury) [N17.9]  Unknown    Hyperkalemia [E87.5]  Unknown    Volume depletion [E86.9]  Unknown    Septic shock [A41.9, R65.21]  Unknown    Metabolic acidosis [E87.2]  Unknown    Starvation ketoacidosis [E87.2]  Unknown      Resolved Hospital Problems   No resolved problems to display.       Hospice Qualifying Diagnoses:        Patient has a life expectancy < 6 months due to:  1) Primary Hospice Diagnosis:  Renal failure and hyperkalemia with hypotension  Comorbid Conditions Contributing to Decline: hypothermia, hypotension, acidosis, AMS:   Vital Signs: Routine per Hospice Protocol.    Code Status: DNR    Allergies: Review of patient's allergies indicates:  No Known Allergies    Diet: pleasure feeds, diet as tolerated if able to tolerate more    Activities: As tolerated    Nursing: Per Hospice Routine    Oliver Care: Empty Oliver bag Q shift and PRN.  Change Oliver every month.    Routine Skin for Bedridden Patients: Apply moisture barrier cream to all skin folds and   wet areas in perineal area daily and after baths and all bowel movements.      Oxygen: PRN    Other Miscellaneous Care:     Medications:   Morphine liquid 5mg PO hourly PRN pain  Ativan 0.5 mg liquid PO hourly PRN agitation       Medication List      You have not been prescribed any medications.               Future Orders:  Hospice Medical Director may dictate new orders for comfortable care measures & sign death certificate.        _________________________________  Roger Benitez MD  01/10/2022

## 2022-01-11 NOTE — PROVIDER PROGRESS NOTES - EMERGENCY DEPT.
Signed out to me from previous attending pending ICU consultation.  We discussed the case with Nephrology and intensive care, she was given a bicarbonate infusion and repeat BMP is improved.  Testing was still elevated but less so.  ICU a long conversation with the patient's son, who agrees patient will be move to comfort care.  In discussion with social Work will [facility] which should be able to accept her by 8:00 a.m. 01/11/2022.  She remained somewhat hypothermic and hypotensive during my shift.  Comfort medications such as morphine, atropine, lorazepam ordered.

## 2022-01-11 NOTE — PLAN OF CARE
SonBull, scheduled to do hospice medicare consents this afternoon.    [facility] (in [facility]) Inpatient Unit  5th floor  [address] [place]  [address]  Fax [phone]  Report, [phone].   will be scheduled once consents are confirmed     01/11/22 1013   Discharge Assessment   Assessment Type Discharge Planning Assessment   Confirmed/corrected address, phone number and insurance Yes   Confirmed Demographics Correct on Facesheet   Source of Information patient;health record   Communicated JULIO CESAR with patient/caregiver Yes   Current cognitive status: Coma/Sedated/Intubated   Discharge Plan A Inpatient Hospice   Discharge Barriers Identified None

## 2022-01-11 NOTE — ED NOTES
Assumed care of pt from Amee CHAVARRIA. Patient resting in stretcher and is in NAD at this time. Pt is sleeping, VSS, respirations even and unlabored. Pt is nonverbal and unable to respond to commands - this is her baseline. Bed low and locked with side rails up x2, call bell in pt reach. Will continue to monitor.

## 2022-01-11 NOTE — ED NOTES
Patient resting in stretcher and is in NAD at this time. Pt is awake but disoriented x4, VSS, respirations even and unlabored. Pt updated on plan of care. Bed low and locked with side rails up x2, call bell in pt reach. Will continue to monitor. Pt repositioned for comfort

## 2022-05-04 NOTE — H&P
Group Therapy Note    Date: 5/3/2022  Start Time: 2000  End Time:  2020      Type of Group: Wrap-Up      Patient's Goal:  Continue to keep self-esteem up     Notes:  Ongoing       Participation Level:  Active Listener    Participation Quality: Appropriate      Speech:  normal      Thought Process/Content: Logical      Affective Functioning: Flat      Mood: euthymic      Level of consciousness:  Alert and Oriented x4      Response to Learning: Able to verbalize current knowledge/experience, Able to verbalize/acknowledge new learning and Capable of insight        Discipline Responsible: Licensed Practical Nurse      Signature:  George Solis LPN [facility] Hospital Medicine  History & Physical    Admitting Team: IM-N  Attending Physician: Carmen Martino MD  Date of Admit: 10/7/2020    Subjective:      History of Present Illness:  Elisha Young is a 89 y.o. female with HTN, HLD, CHF who presents with a chief complaint of vaginal bleeding.  Patient has been suffering from bowel and urinary incontinence over the past month and has been using adult diapers.  One week prior, patient had an episode of bleeding in the diaper that quickly resolved.  She and her son are not exactly sure where the bleeding is coming from but vaginal source was suspected.  Beginning yesterday, patient reports bleeding returned.  It has progressively worsened since last night.  Now noted bright red blood and clots.  Patient is on no anticoagulants or anti-platelet agents.  She reports having hysterectomy years prior.  No weakness, fatigue, SOB, or lightheadedness.  No suprapubic pain, dysuria, increased frequency. No n/v, headache, confusion. She reports sufficient oral intake, but son believes she has been eating less than normal.     In ED, straight cath revealed dark bloody urine, so hematuria is now suspected to be the source of bleed. Oliver placed and bladder irrigated. Hb WNL. UA indicated possible UTI, CTX initiated. Also found to have Na 123, Cl 90. No source of vaginal bleed noted on pelvic US. Echogenic nonmobile focus on posterior bladder wall noted on RP u/s, with neoplasm as a consideration. Urology consulted.       Past Medical History:  Past Medical History:   Diagnosis Date    CHF (congestive heart failure)     Dyslipidemia     Hyperlipidemia     Hypertension        Past Surgical History:  Past Surgical History:   Procedure Laterality Date    HYSTERECTOMY      tonsils         Allergies:  Review of patient's allergies indicates:  No Known Allergies    Home Medications:  Prior to Admission medications    Medication Sig Start Date End Date  "Taking? Authorizing Provider   acetaminophen (TYLENOL) 325 MG tablet Take 2 tablets (650 mg total) by mouth every 6 (six) hours as needed. 19   Larry Boateng MD   ANTIOX #11/OM3/DHA/EPA/LUT/SATYA (OCUVITE ADULT 50+ ORAL) Take by mouth.    Historical Provider   ferrous sulfate 325 (65 FE) MG EC tablet Take 1 tablet (325 mg total) by mouth once daily. 19   Larry Boateng MD   senna-docusate 8.6-50 mg (PERICOLACE) 8.6-50 mg per tablet Take 2 tablets by mouth once daily. 19   Larry Boateng MD   simvastatin (ZOCOR) 20 MG tablet  13   Historical Provider       Family History:  Family History   Problem Relation Age of Onset    Heart disease Father        Social History:  Social History     Tobacco Use    Smoking status: Never Smoker    Smokeless tobacco: Never Used   Substance Use Topics    Alcohol use: No    Drug use: Never       Review of Systems:  As per HPI  General: no fever, no chills, no weight loss, no fatigue  Nose, Sinuses: no rhinorrhea, no facial pain, no epistaxis  Cardiovascular: no chest pain, no orthopnea, no dyspnea  Respiratory: no cough, no wheezes, no chest pain  Urinary: no dysuria, no increased frequency, no hematuria  Hematologic: no easy bruising, no overt bleeding, no petechiae  Endocrine: no heat intolerance, no cold intolerance, no polyuria  Neurologic: no seizures, no tremors, no numbness  Psychiatric: no hallucination, no depression, no suicidal ideation  Skin: no rashes, no pruritus, no pallor  All other systems reviewed & are negative.        Objective:   Last 24 Hour Vital Signs:  BP  Min: 111/64  Max: 133/65  Temp  Av.6 °F (36.4 °C)  Min: 97.6 °F (36.4 °C)  Max: 97.6 °F (36.4 °C)  Pulse  Av  Min: 93  Max: 105  Resp  Av.4  Min: 16  Max: 20  SpO2  Av.6 %  Min: 96 %  Max: 99 %  Height  Av' 2" (157.5 cm)  Min: 5' 2" (157.5 cm)  Max: 5' 2" (157.5 cm)  Weight  Av.9 kg (110 lb)  Min: 49.9 kg (110 lb)  Max: 49.9 kg (110 lb)  Body " mass index is 20.12 kg/m².  No intake/output data recorded.    Physical Examination:  GEN: AAOx3, NAD  HEENT: NCAT, MMM, PERRL, EOMI, oropharynx clear  CV: RRR, no m/r  RESP: CTAB, no wheezes/crackles  : ramos in place with hematuria  ABD: soft, NTND, normoactive BS, no organomegaly  EXTR: no c/c/e, 2+ distal pulses x 4  NEURO: PERRL, EOMI, 5/5 motor strength all four extremities, intact sensation to light touch, no focal deficits  SKIN: no rashes, lesions, or color changes  PSYCH: normal affect     Laboratory:  I have reviewed all pertinent laboratory data within the past 24 hours.    Other Results:  EKG (my interpretation): sinus tach with PACs    Radiology:  I have reviewed all pertinent radiology imaging within the past 24 hours.    Prior records reviewed.     Assessment/Plan:     Elisha Young is a 89 y.o. female with:    Hyponatremia  Hypochloremia  -Na 123, Cl 90. Baseline Na 129-133  -suspect hypovolemic  -asymptomatic  -normal saline initiated  -trend Na q6  -avoid correction > 4-6 per day    Hematuria  -Hb WNL. RP u/s concerning for possible neoplasm  -Ramos placed, s/p irrigation  -Urology consult  -trend H/H    Abnormal UA  -concerning for UTI w/ hematuria  -f/u UCx  -CTX    HTN  -takes antihypertensive at home. Does not know name of it. Call son tomorrow to obtain list of medications. pharmD consult placed.   -BP WNL currently    Hypokalemia  -replete      Discharge Planning   JULIO CESAR:  10/8-10/9    Code Status: Prior   Is the patient medically ready for discharge?:     Reason for patient still in hospital (select all that apply): Patient new problem, Patient trending condition, Laboratory test, Treatment, Consult recommendations and PT / OT recommendations             Carmen Martino MD  Hospital Medicine Staff  [facility]

## 2022-09-21 ENCOUNTER — PES CALL (OUTPATIENT)
Dept: ADMINISTRATIVE | Facility: OTHER | Age: 87
End: 2022-09-21
Payer: MEDICARE

## 2022-09-22 NOTE — NURSING
Pt. Dc to home. AVS reviewed with pt, and pts. Son.  IV, Purewick and visi removed.   Pt. received a bedside Rx meds from in house pharmacy before DC.   Pt. left unit on WC with all personal belongings accompanied by family.    Alert and oriented to person, place, time/situation. normal mood and affect. no apparent risk to self or others.

## (undated) DEVICE — CATH 5FR OPEN END URETERAL

## (undated) DEVICE — TRAY CYSTO BASIN

## (undated) DEVICE — PAD GROUNDING CONMED ADULT

## (undated) DEVICE — SET CYSTO IRRIGATION UNIV SPIK

## (undated) DEVICE — SYR 10CC LUER LOCK

## (undated) DEVICE — PACK CYSTO

## (undated) DEVICE — GLOVE BIOGEL 7.5

## (undated) DEVICE — SOL IRR WATER STRL 3000 ML

## (undated) DEVICE — GUIDE WIRE MOTION .035 X 150CM

## (undated) DEVICE — GOWN SMARTGOWN LVL4 X-LONG XL